# Patient Record
Sex: MALE | Race: WHITE | HISPANIC OR LATINO | Employment: OTHER | URBAN - METROPOLITAN AREA
[De-identification: names, ages, dates, MRNs, and addresses within clinical notes are randomized per-mention and may not be internally consistent; named-entity substitution may affect disease eponyms.]

---

## 2017-10-03 ENCOUNTER — TELEPHONE (OUTPATIENT)
Dept: SPINE | Facility: CLINIC | Age: 47
End: 2017-10-03

## 2017-12-04 ENCOUNTER — TELEPHONE (OUTPATIENT)
Dept: SPINE | Facility: CLINIC | Age: 47
End: 2017-12-04

## 2017-12-04 DIAGNOSIS — M54.2 CERVICALGIA: Primary | ICD-10-CM

## 2017-12-05 ENCOUNTER — HOSPITAL ENCOUNTER (OUTPATIENT)
Dept: RADIOLOGY | Facility: OTHER | Age: 47
Discharge: HOME | End: 2017-12-05
Attending: PHYSICIAN ASSISTANT
Payer: COMMERCIAL

## 2017-12-05 ENCOUNTER — OFFICE VISIT (OUTPATIENT)
Dept: SPINE | Facility: CLINIC | Age: 47
End: 2017-12-05
Payer: COMMERCIAL

## 2017-12-05 VITALS
WEIGHT: 205 LBS | DIASTOLIC BLOOD PRESSURE: 85 MMHG | HEART RATE: 63 BPM | BODY MASS INDEX: 30.36 KG/M2 | SYSTOLIC BLOOD PRESSURE: 136 MMHG | HEIGHT: 69 IN

## 2017-12-05 DIAGNOSIS — M54.2 NECK PAIN: ICD-10-CM

## 2017-12-05 DIAGNOSIS — M50.30 DEGENERATION OF CERVICAL INTERVERTEBRAL DISC: ICD-10-CM

## 2017-12-05 DIAGNOSIS — M54.2 CERVICALGIA: ICD-10-CM

## 2017-12-05 DIAGNOSIS — M47.812 CERVICAL SPONDYLOSIS WITHOUT MYELOPATHY: ICD-10-CM

## 2017-12-05 DIAGNOSIS — M54.12 CERVICAL RADICULITIS: ICD-10-CM

## 2017-12-05 PROCEDURE — 72050 X-RAY EXAM NECK SPINE 4/5VWS: CPT | Mod: 26,,, | Performed by: RADIOLOGY

## 2017-12-05 PROCEDURE — 99999 PR PBB SHADOW E&M-EST. PATIENT-LVL III: CPT | Mod: PBBFAC,,, | Performed by: PHYSICIAN ASSISTANT

## 2017-12-05 PROCEDURE — 99204 OFFICE O/P NEW MOD 45 MIN: CPT | Mod: S$GLB,,, | Performed by: PHYSICIAN ASSISTANT

## 2017-12-05 PROCEDURE — 72050 X-RAY EXAM NECK SPINE 4/5VWS: CPT | Mod: TC

## 2017-12-05 RX ORDER — GABAPENTIN 300 MG/1
CAPSULE ORAL
Qty: 90 CAPSULE | Refills: 2 | Status: SHIPPED | OUTPATIENT
Start: 2017-12-05 | End: 2018-04-26 | Stop reason: SDUPTHER

## 2017-12-05 RX ORDER — METHOCARBAMOL 750 MG/1
750 TABLET, FILM COATED ORAL 3 TIMES DAILY PRN
Qty: 90 TABLET | Refills: 1 | Status: SHIPPED | OUTPATIENT
Start: 2017-12-05 | End: 2018-01-04

## 2017-12-05 NOTE — PROGRESS NOTES
Subjective:     Patient ID:  Heraclio Lam is a 47 y.o. male.    Patient referred by a friend to Dr. Jaramillo    Chief Complaint: Neck and bilateral arm pain and paresthesias    HPI    Heraclio Lam is a 47 y.o. male who presents with the above CC.  Patient here for a second opinion.  Patient had a work injury in 1999 and states he had a shunt placed into his spinal cord from a posterior approach that initially helped with his burning neck and arm pain.  He then had a recurrence of symptoms and started to have pain again and had a C6-7 ACDF and got better.  Since then he has had a flare up of pain on and off over the years.  He has seen Dr. Carlson recently and in the past.  No further surgery recommended and PT ordered in the past.    Currently has had burning neck pain and bilateral arm pain in the back of the arms to all five fingers.  Pain in the neck and arms is constant rated 10/10.  Neck pain is worse with laying too long and with neck movement and better when he sleeps on his right.    Neck pain is worse than the arm pain.    Right and left arm pain is equal.    Some pain in the bilateral calves to the bottom of the feet.    Patient had PT which helped some.  No ESIs.  Two cervical spine surgeries.  Patient is currently not taking any medications for this problem.    Patient denies any recent accidents or trauma, no saddle anesthesias, and no bowel or bladder incontinence.    Patient has some difficulty with balance or gait, no difficulty tying shoes or buttoning clothes, is dropping things which is old, has difficulty opening containers which is new, and has had some change in handwriting.      Review of Systems:  Please refer to page three of the spine center intake form for a complete review of systems.    History reviewed. No pertinent past medical history.  History reviewed. No pertinent surgical history.  No current outpatient prescriptions on file prior to visit.     No current  "facility-administered medications on file prior to visit.      Review of patient's allergies indicates:  No Known Allergies  Social History     Social History    Marital status: Single     Spouse name: N/A    Number of children: N/A    Years of education: N/A     Occupational History    Not on file.     Social History Main Topics    Smoking status: Never Smoker    Smokeless tobacco: Not on file    Alcohol use Not on file    Drug use: Unknown    Sexual activity: Not on file     Other Topics Concern    Not on file     Social History Narrative    No narrative on file     History reviewed. No pertinent family history.    Objective:      Vitals:    12/05/17 1338   BP: 136/85   Pulse: 63   Weight: 93 kg (205 lb)   Height: 5' 9" (1.753 m)   PainSc:   7         Physical Exam:    General:  Heraclio Lam is well-developed, well-nourished, appears stated age, in no acute distress, alert and oriented to person, place, and time.    Pulmonary/Chest:  Respiratory effort normal  Abdominal: Exhibits no distension  Psychiatric:  Normal mood and affect.  Behavior is normal.  Judgement and thought content normal    Musculoskeletal:    Patient arises from a sitting to standing position without difficulty.  Patient walks to the door without evidence of limp, pain, or abnormality of gait. Patient is able to walk heel to toe at a slow pace.    Cervical ROM:   No pain in cervical spine flexion, extension, right lateral bending, left lateral bending, right rotation, and left rotation.    Cervical Spine Inspection:  Healed posterior and anterior surgical scars with no visible rashes.    Cervical Spine Palpation:  No tenderness to cervical spine palpation.    Neurological: Alert and oriented to person, place, and time    Muscle strength against resistance:     Right Left   Deltoid  5 / 5 5 / 5   Biceps  5 / 5 5 / 5   Triceps 5 / 5 5 / 5   Wrist flexion  5 / 5 5 / 5   Wrist extension 5 / 5 5 / 5   Finger abduction 5 / 5 5 / 5 "   Thumb opposition 5 / 5 5 / 5   Handgrip 5 / 5 5 / 5   Hip flexion  5 / 5 5 / 5   Hip extension 5 / 5 5 / 5   Hip abduction 5 / 5 5 / 5   Hip adduction 5/ 5 5 / 5   Knee extension  5 / 5 5 / 5   Knee flexion  5 / 5 5 / 5   Dorsiflexion  5 / 5 5 / 5   EHL  5 / 5 5 / 5   Plantar flexion  5 / 5 5 / 5   Inversion of the feet 5 / 5 5 / 5   Eversion of the feet 5 / 5 5 / 5     Reflexes:     Right Left   Triceps 2+ 2+   Biceps 2+ 2+   Brachioradialis 2+ 2+   Patellar 2+ 2+   Achilles 2+ 2+     Babinski: Negative bilaterally  Clonus:  Negative bilaterally  Lopez: Negative bilaterally  Negative lhermittes sign    On gross examination of the bilateral lower extremities, patient has full painfree ROM with no signs of clubbing, cyanosis, edema, and weakness.     XRAY/MRI Interpretation:     Cervical spine ap/lateral/flexion/extension xrays were personally reviewed today.  No fractures.  ACDF at C6-7.  Slight movement at C3-4 and C4-5 on flexion and extension.  DDD at C3-4.    Cervical spine MRI was personally reviewed today on a CD from 09/19/2017 which was then returned to the patient after reviewing it with Dr. Jaramillo.  C6-7 ACDF.  Previous posterior laminectomy defect.  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  Bilateral NFS at C3-4.  Left C4-5 NFS.  Disc bulge centrally at C5-6.    Assessment:          1. Cervical radiculitis    2. Cervical spondylosis without myelopathy    3. Degeneration of cervical intervertebral disc    4. Neck pain            Plan:          Orders Placed This Encounter    Ambulatory referral to Pain Clinic    gabapentin (NEURONTIN) 300 MG capsule    methocarbamol (ROBAXIN) 750 MG Tab     Patient with periodic flareups of neck and arm pain s/p C6-7 ACDF with Dr. Carlson in 2006 and ? spinal cord shunt in 1999.    -Recommend Neurontin  -Robaxin PRN  -Referral to pain clinic for assessment of injections  -No spine surgery needed at this time    All of the above discussed and reviewed with Dr. Jaramillo who  came in to see the patient today.    Follow-Up:  Return if symptoms worsen or fail to improve. If there are any questions prior to this, the patient was instructed to contact the office.       YAS Gonzalez, PA-C  Neurosurgery  [facility]  [facility]

## 2017-12-15 ENCOUNTER — OFFICE VISIT (OUTPATIENT)
Dept: PAIN MEDICINE | Facility: CLINIC | Age: 47
End: 2017-12-15
Attending: ANESTHESIOLOGY
Payer: COMMERCIAL

## 2017-12-15 VITALS
TEMPERATURE: 99 F | BODY MASS INDEX: 32.65 KG/M2 | DIASTOLIC BLOOD PRESSURE: 86 MMHG | WEIGHT: 220.44 LBS | RESPIRATION RATE: 18 BRPM | SYSTOLIC BLOOD PRESSURE: 141 MMHG | HEIGHT: 69 IN | HEART RATE: 78 BPM

## 2017-12-15 DIAGNOSIS — G89.4 CHRONIC PAIN SYNDROME: ICD-10-CM

## 2017-12-15 DIAGNOSIS — M96.1 CERVICAL POST-LAMINECTOMY SYNDROME: ICD-10-CM

## 2017-12-15 DIAGNOSIS — M54.12 CERVICAL RADICULOPATHY: ICD-10-CM

## 2017-12-15 PROCEDURE — 99999 PR PBB SHADOW E&M-EST. PATIENT-LVL III: CPT | Mod: PBBFAC,,, | Performed by: ANESTHESIOLOGY

## 2017-12-15 PROCEDURE — 99244 OFF/OP CNSLTJ NEW/EST MOD 40: CPT | Mod: S$GLB,,, | Performed by: ANESTHESIOLOGY

## 2017-12-15 NOTE — LETTER
December 15, 2017      Herlinda Baird PA-C  [address]  [facility] [address]           [facility]  [address] [facility] [address]  Phone: [phone]  Fax: [phone]          Patient: Heraclio Lam   MR Number: [id number]   YOB: 1970   Date of Visit: 12/15/2017       Dear Herlinda Baird:    Thank you for referring Heraclio Lam to me for evaluation. Attached you will find relevant portions of my assessment and plan of care.    If you have questions, please do not hesitate to call me. I look forward to following Heraclio Lam along with you.    Sincerely,    Elba Juarez MD    Enclosure  CC:  No Recipients    If you would like to receive this communication electronically, please contact [email or web address] or [phone] to request more information on [employer] access.    For providers and/or their staff who would like to refer a patient to [facility], please contact us through our one-stop-shop provider referral line, [facility], at [phone].    If you feel you have received this communication in error or would no longer like to receive these types of communications, please e-mail [email or web address]

## 2017-12-15 NOTE — PROGRESS NOTES
Chronic Pain - New Consult    Referring Physician: Herlinda Baird, *    Chief Complaint:   Chief Complaint   Patient presents with    Neck Pain    Back Pain    Shoulder Pain        SUBJECTIVE: Disclaimer: This note has been generated using voice-recognition software. There may be typographical errors that have been missed during proof-reading    Initial encounter:    Heraclio Lam presents to the clinic for the evaluation of neck pain. The pain started years ago following picking up a heavy object and symptoms have been worsening.    Brief history:  Patient has a history of ACDF at C6-7 and a history of posterior laminectomy of the cervical spine at the same levels.  In addition to his bilateral upper extremity radicular symptoms he does have bilateral lower extremity radicular symptoms.  Patient has not been having any signs of myelopathy    Pain Description:    The pain is located in the neck area and radiates to bilateral upper extremities    At BEST  0/10     At WORST  10/10 on the WORST day.      On average pain is rated as 8/10.     Today the pain is rated as 8/10    The pain is described as aching, burning, numbing, shooting, throbbing, tight band and tingling      Symptoms interfere with daily activity and sleeping.     Exacerbating factors: Sitting, Laying, Bending, Lifting and Getting out of bed/chair.      Mitigating factors medications.     Patient denies .  Patient denies any suicidal or homicidal ideations    Pain Medications:  Current:  Gabapentin currently 300mg qHS  Robaxin    Tried in Past:  NSAIDs -Never  TCA -Never  SNRI -Never  Opioids-Never    Physical Therapy/Home Exercise: yes       report:  Reviewed and consistent with medication use as prescribed.    Pain Procedures: none    Chiropractor -never  Acupuncture - never  TENS unit -never  Spinal decompression - previous ACDF C6-7 previous posterior laminectomy  Joint replacement -never    Imaging:   XRAY C SPINE  12/04/2017    Narrative     X-ray cervical spine AP lateral with flexion and extension demonstrates that the patient has undergone a prior C6-C7 anterior fusion with plate and screws and posterior decompression, and the fusion appears solid.  There is normal alignment, and flexion and extension views show no instability.  T1 is not optimally viewed on the lateral views.   Impression      Postoperative changes at C6-C7, no instability     Cervical MRI 9/2017:   C6-7 ACDF.  Previous posterior laminectomy defect.  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  Bilateral NFS at C3-4.  Left C4-5 NFS.  Disc bulge centrally at C5-6.    History reviewed. No pertinent past medical history.  History reviewed. No pertinent surgical history.  Social History     Social History    Marital status: Single     Spouse name: N/A    Number of children: N/A    Years of education: N/A     Occupational History    Not on file.     Social History Main Topics    Smoking status: Never Smoker    Smokeless tobacco: Not on file    Alcohol use Not on file    Drug use: Unknown    Sexual activity: Not on file     Other Topics Concern    Not on file     Social History Narrative    No narrative on file     History reviewed. No pertinent family history.    Review of patient's allergies indicates:  No Known Allergies    Current Outpatient Prescriptions   Medication Sig    gabapentin (NEURONTIN) 300 MG capsule Take one cap PO QHS for 7 days, then one cap PO BID for the next 7 days, and then take one cap PO TID and continue    methocarbamol (ROBAXIN) 750 MG Tab Take 1 tablet (750 mg total) by mouth 3 (three) times daily as needed.     No current facility-administered medications for this visit.        REVIEW OF SYSTEMS:    GENERAL:  No weight loss, malaise or fevers.  HEENT:   No recent changes in vision or hearing  NECK:  Negative for lumps, no difficulty with swallowing.  RESPIRATORY:  Negative for cough, wheezing or shortness of breath, patient  "denies any recent URI.  CARDIOVASCULAR:  Negative for chest pain, leg swelling or palpitations.  GI:  Negative for abdominal discomfort, blood in stools or black stools or change in bowel habits.  MUSCULOSKELETAL:  See HPI.  SKIN:  Negative for lesions, rash, and itching.  PSYCH:  No mood disorder or recent psychosocial stressors.  Patients sleep is disturbed secondary to pain.  HEMATOLOGY/LYMPHOLOGY:  Negative for prolonged bleeding, bruising easily or swollen nodes.  Patient is not currently taking any anti-coagulants  ENDO: No history of diabetes or thyroid dysfunction  NEURO:   No history of headaches, syncope, paralysis, seizures or tremors.  All other reviewed and negative other than HPI.    OBJECTIVE:    BP (!) 141/86   Pulse 78   Temp 98.5 °F (36.9 °C) (Oral)   Resp 18   Ht 5' 9" (1.753 m)   Wt 100 kg (220 lb 7.4 oz)   BMI 32.56 kg/m²     PHYSICAL EXAMINATION:    GENERAL: Well appearing, in no acute distress, alert and oriented x3.  PSYCH:  Mood and affect appropriate.  SKIN: Skin color, texture, turgor normal, no rashes or lesions.  HEAD/FACE:  Normocephalic, atraumatic. Cranial nerves grossly intact.  NECK: Pain to palpation over the cervical paraspinous muscles. Spurling Negative. Pain with neck flexion, extension, and lateral flexion.   CV: RRR with palpation of the radial artery.  PULM: No evidence of respiratory difficulty, symmetric chest rise.  EXTREMITIES: Peripheral joint ROM is full and pain free without obvious instability or laxity in all four extremities. No deformities, edema, or skin discoloration. Good capillary refill.  MUSCULOSKELETAL: Shoulder provocative maneuvers are negative.   Bilateral upper  extremity strength is normal and symmetric.  No atrophy or tone abnormalities are noted.  NEURO: Bilateral upper extremity coordination and muscle stretch reflexes are physiologic and symmetric.  Armaan's negative. No clonus.  No loss of sensation is noted.  GAIT: Antalgic, ambulates " without assistance         ASSESSMENT: 47 y.o. year old male with pain, consistent with     Encounter Diagnoses   Name Primary?    Cervical radiculopathy     Chronic pain syndrome     Cervical post-laminectomy syndrome        PLAN:   Because of a history of his previous cervical fusion and laminectomy at C6-7 we will perform intralaminar epidural steroid injection at T1/2 for radicular symptoms by 2, 2 weeks apart with a two-week follow-up    Continue escalation of gabapentin to goal dose of 900 mg per day in the future this can be further escalated to 1800 mg per day    I provided the patient with a [facility] informational packet to learn about stimulation as a possible treatment option in the future    Follow-up after injection to determine plan moving forward    The above plan and management options were discussed at length with patient. Patient is in agreement with the above and verbalized understanding. It will be communicated with the referring physician via electronic record, fax, or [employer].    Elba Juarez  12/15/2017

## 2017-12-22 ENCOUNTER — SURGERY (OUTPATIENT)
Age: 47
End: 2017-12-22

## 2017-12-22 ENCOUNTER — HOSPITAL ENCOUNTER (OUTPATIENT)
Facility: OTHER | Age: 47
Discharge: HOME | End: 2017-12-22
Attending: ANESTHESIOLOGY | Admitting: ANESTHESIOLOGY
Payer: COMMERCIAL

## 2017-12-22 VITALS
HEIGHT: 69 IN | SYSTOLIC BLOOD PRESSURE: 127 MMHG | WEIGHT: 220 LBS | BODY MASS INDEX: 32.58 KG/M2 | RESPIRATION RATE: 18 BRPM | OXYGEN SATURATION: 97 % | TEMPERATURE: 98 F | DIASTOLIC BLOOD PRESSURE: 82 MMHG | HEART RATE: 69 BPM

## 2017-12-22 DIAGNOSIS — M54.12 CERVICAL RADICULOPATHY: Primary | ICD-10-CM

## 2017-12-22 DIAGNOSIS — G89.29 CHRONIC PAIN: ICD-10-CM

## 2017-12-22 PROCEDURE — 62321 NJX INTERLAMINAR CRV/THRC: CPT | Mod: ,,, | Performed by: ANESTHESIOLOGY

## 2017-12-22 PROCEDURE — 62321 NJX INTERLAMINAR CRV/THRC: CPT | Performed by: ANESTHESIOLOGY

## 2017-12-22 PROCEDURE — 99152 MOD SED SAME PHYS/QHP 5/>YRS: CPT | Mod: ,,, | Performed by: ANESTHESIOLOGY

## 2017-12-22 PROCEDURE — 62320 NJX INTERLAMINAR CRV/THRC: CPT | Performed by: ANESTHESIOLOGY

## 2017-12-22 PROCEDURE — 63600175 PHARM REV CODE 636 W HCPCS: Performed by: ANESTHESIOLOGY

## 2017-12-22 PROCEDURE — 25500020 PHARM REV CODE 255: Performed by: ANESTHESIOLOGY

## 2017-12-22 PROCEDURE — 25000003 PHARM REV CODE 250: Performed by: ANESTHESIOLOGY

## 2017-12-22 RX ORDER — LIDOCAINE HYDROCHLORIDE 10 MG/ML
INJECTION INFILTRATION; PERINEURAL
Status: DISCONTINUED | OUTPATIENT
Start: 2017-12-22 | End: 2017-12-22 | Stop reason: HOSPADM

## 2017-12-22 RX ORDER — MIDAZOLAM HYDROCHLORIDE 1 MG/ML
INJECTION INTRAMUSCULAR; INTRAVENOUS
Status: DISCONTINUED | OUTPATIENT
Start: 2017-12-22 | End: 2017-12-22 | Stop reason: HOSPADM

## 2017-12-22 RX ORDER — DEXAMETHASONE SODIUM PHOSPHATE 10 MG/ML
INJECTION INTRAMUSCULAR; INTRAVENOUS
Status: DISCONTINUED | OUTPATIENT
Start: 2017-12-22 | End: 2017-12-22 | Stop reason: HOSPADM

## 2017-12-22 RX ORDER — BUPIVACAINE HYDROCHLORIDE 2.5 MG/ML
INJECTION, SOLUTION EPIDURAL; INFILTRATION; INTRACAUDAL
Status: DISCONTINUED | OUTPATIENT
Start: 2017-12-22 | End: 2017-12-22 | Stop reason: HOSPADM

## 2017-12-22 RX ORDER — SODIUM CHLORIDE 9 MG/ML
500 INJECTION, SOLUTION INTRAVENOUS CONTINUOUS
Status: DISCONTINUED | OUTPATIENT
Start: 2017-12-22 | End: 2017-12-22 | Stop reason: HOSPADM

## 2017-12-22 RX ADMIN — LIDOCAINE HYDROCHLORIDE 10 ML: 10 INJECTION, SOLUTION INFILTRATION; PERINEURAL at 08:12

## 2017-12-22 RX ADMIN — DEXAMETHASONE SODIUM PHOSPHATE 10 MG: 10 INJECTION, SOLUTION INTRAMUSCULAR; INTRAVENOUS at 08:12

## 2017-12-22 RX ADMIN — IOHEXOL 3 ML: 300 INJECTION, SOLUTION INTRAVENOUS at 08:12

## 2017-12-22 RX ADMIN — MIDAZOLAM HYDROCHLORIDE 1 MG: 1 INJECTION, SOLUTION INTRAMUSCULAR; INTRAVENOUS at 08:12

## 2017-12-22 RX ADMIN — MIDAZOLAM HYDROCHLORIDE 2 MG: 1 INJECTION, SOLUTION INTRAMUSCULAR; INTRAVENOUS at 08:12

## 2017-12-22 RX ADMIN — BUPIVACAINE HYDROCHLORIDE 10 ML: 2.5 INJECTION, SOLUTION EPIDURAL; INFILTRATION; INTRACAUDAL; PERINEURAL at 08:12

## 2017-12-22 NOTE — PLAN OF CARE
PATIENT TOLERATED PROCEDURE WELL. PT COMPLAINS OF 0PATIENT TOLERATED PROCEDURE WELL. PT COMPLAINS OF  /10 PAIN. ASSISTED PATIENT UP FOR FIRST TIME. STEADY ON FEET AND DISCHARGE INSTRUCTIONS GIVEN. /10 PAIN. ASSISTED PATIENT UP FOR FIRST TIME. STEADY ON FEET AND DISCHARGE INSTRUCTIONS GIVEN.

## 2017-12-22 NOTE — OP NOTE
Cervical Interlaminar Epidural Steroid Injection under Fluoroscopic Guidance.   Time-out taken to identify patient and procedure prior to starting the procedure.     Date of Procedure: 12/22/2017    PROCEDURE: Cervical Interlaminar epidural steroid injection T1/2 under fluoroscopic guidance.     Pre-Op diagnosis: Cervical radiculopathy [M54.12]    Post-Op diagnosis: Cervical radiculopathy [M54.12]    PHYSICIAN: CHRIS MATA     Assistants:   Killian Roman DO, PGY-5, Pain Fellow  I was present and supervising all critical portions of the procedure      MEDICATIONS INJECTED: Preservative-free Decadron 10 mg with 1mL of sterile 0.25%Bupivicaine and 3ml of preservative free normal saline.     LOCAL ANESTHETIC INJECTED: Xylocaine 1% 3mL    ESTIMATED BLOOD LOSS: none.     COMPLICATIONS: none.     TECHNIQUE: With the patient laying in a prone position, the area was prepped and draped in the usual sterile fashion using ChloraPrep and a fenestrated drape. Local anesthetic was given using a 27-gauge needle by raising a wheal and going down to the hub of the needle over the C7/T1 interlaminar space.  The interlaminar space was then approached with a 3.5 inch 18-gauge Touhy needle was introduced under fluoroscopic guidance in the AP and Lateral view. Once the Ligamentum flavum was encountered loss of resistance to saline was used to enter the epidural space. With positive loss of resistance and negative CSF or Blood, 2mL contrast dye Omnipaque (300mg/ml) was injected to confirm placement and there was no vascular runoff. The medication was then injected slowly. The patient tolerated the procedure well.     Conscious sedation provided by M.D    The patient was monitored with continuous pulse oximetry, EKG, and intermittent blood pressure monitors.  The patient was hemodynamically stable throughout the entire process was responsive to voice, and breathing spontaneously.  Supplemental O2 was provided at 2L/min via nasal  cannula.  Patient was comfortable for the duration of the procedure. (See nurse documentation and case log for sedation time)    There was a total of 3 mg IV Midazolam was titrated for the procedure        The patient was monitored after the procedure.   They were given post-procedure and discharge instructions to follow at home.  The patient was discharged in a stable condition.

## 2017-12-22 NOTE — DISCHARGE SUMMARY
Discharge Note  Short Stay      SUMMARY     Admit Date: 12/22/2017    Attending Physician: Elba Juarez    Discharge Diagnosis: Cervical radiculopathy [M54.12]    Discharge Physician: Elba Juarez      Discharge Date: 12/22/2017 8:47 AM       PROCEDURE: Cervical Interlaminar epidural steroid injection T1/2 under fluoroscopic guidance.     Pre-Op diagnosis: Cervical radiculopathy [M54.12]    Post-Op diagnosis: Cervical radiculopathy [M54.12]    Disposition: Home or self care    Patient Instructions:   Current Discharge Medication List      CONTINUE these medications which have NOT CHANGED    Details   gabapentin (NEURONTIN) 300 MG capsule Take one cap PO QHS for 7 days, then one cap PO BID for the next 7 days, and then take one cap PO TID and continue  Qty: 90 capsule, Refills: 2    Associated Diagnoses: Cervical radiculitis; Cervical spondylosis without myelopathy; Degeneration of cervical intervertebral disc; Neck pain      methocarbamol (ROBAXIN) 750 MG Tab Take 1 tablet (750 mg total) by mouth 3 (three) times daily as needed.  Qty: 90 tablet, Refills: 1    Associated Diagnoses: Cervical radiculitis; Cervical spondylosis without myelopathy; Degeneration of cervical intervertebral disc; Neck pain             Resume home diet and activity

## 2017-12-22 NOTE — H&P (VIEW-ONLY)
Chronic Pain - New Consult    Referring Physician: Herlinda Baird, *    Chief Complaint:   Chief Complaint   Patient presents with    Neck Pain    Back Pain    Shoulder Pain        SUBJECTIVE: Disclaimer: This note has been generated using voice-recognition software. There may be typographical errors that have been missed during proof-reading    Initial encounter:    Heraclio Lam presents to the clinic for the evaluation of neck pain. The pain started years ago following picking up a heavy object and symptoms have been worsening.    Brief history:  Patient has a history of ACDF at C6-7 and a history of posterior laminectomy of the cervical spine at the same levels.  In addition to his bilateral upper extremity radicular symptoms he does have bilateral lower extremity radicular symptoms.  Patient has not been having any signs of myelopathy    Pain Description:    The pain is located in the neck area and radiates to bilateral upper extremities    At BEST  0/10     At WORST  10/10 on the WORST day.      On average pain is rated as 8/10.     Today the pain is rated as 8/10    The pain is described as aching, burning, numbing, shooting, throbbing, tight band and tingling      Symptoms interfere with daily activity and sleeping.     Exacerbating factors: Sitting, Laying, Bending, Lifting and Getting out of bed/chair.      Mitigating factors medications.     Patient denies .  Patient denies any suicidal or homicidal ideations    Pain Medications:  Current:  Gabapentin currently 300mg qHS  Robaxin    Tried in Past:  NSAIDs -Never  TCA -Never  SNRI -Never  Opioids-Never    Physical Therapy/Home Exercise: yes       report:  Reviewed and consistent with medication use as prescribed.    Pain Procedures: none    Chiropractor -never  Acupuncture - never  TENS unit -never  Spinal decompression - previous ACDF C6-7 previous posterior laminectomy  Joint replacement -never    Imaging:   XRAY C SPINE  12/04/2017    Narrative     X-ray cervical spine AP lateral with flexion and extension demonstrates that the patient has undergone a prior C6-C7 anterior fusion with plate and screws and posterior decompression, and the fusion appears solid.  There is normal alignment, and flexion and extension views show no instability.  T1 is not optimally viewed on the lateral views.   Impression      Postoperative changes at C6-C7, no instability     Cervical MRI 9/2017:   C6-7 ACDF.  Previous posterior laminectomy defect.  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  Bilateral NFS at C3-4.  Left C4-5 NFS.  Disc bulge centrally at C5-6.    History reviewed. No pertinent past medical history.  History reviewed. No pertinent surgical history.  Social History     Social History    Marital status: Single     Spouse name: N/A    Number of children: N/A    Years of education: N/A     Occupational History    Not on file.     Social History Main Topics    Smoking status: Never Smoker    Smokeless tobacco: Not on file    Alcohol use Not on file    Drug use: Unknown    Sexual activity: Not on file     Other Topics Concern    Not on file     Social History Narrative    No narrative on file     History reviewed. No pertinent family history.    Review of patient's allergies indicates:  No Known Allergies    Current Outpatient Prescriptions   Medication Sig    gabapentin (NEURONTIN) 300 MG capsule Take one cap PO QHS for 7 days, then one cap PO BID for the next 7 days, and then take one cap PO TID and continue    methocarbamol (ROBAXIN) 750 MG Tab Take 1 tablet (750 mg total) by mouth 3 (three) times daily as needed.     No current facility-administered medications for this visit.        REVIEW OF SYSTEMS:    GENERAL:  No weight loss, malaise or fevers.  HEENT:   No recent changes in vision or hearing  NECK:  Negative for lumps, no difficulty with swallowing.  RESPIRATORY:  Negative for cough, wheezing or shortness of breath, patient  "denies any recent URI.  CARDIOVASCULAR:  Negative for chest pain, leg swelling or palpitations.  GI:  Negative for abdominal discomfort, blood in stools or black stools or change in bowel habits.  MUSCULOSKELETAL:  See HPI.  SKIN:  Negative for lesions, rash, and itching.  PSYCH:  No mood disorder or recent psychosocial stressors.  Patients sleep is disturbed secondary to pain.  HEMATOLOGY/LYMPHOLOGY:  Negative for prolonged bleeding, bruising easily or swollen nodes.  Patient is not currently taking any anti-coagulants  ENDO: No history of diabetes or thyroid dysfunction  NEURO:   No history of headaches, syncope, paralysis, seizures or tremors.  All other reviewed and negative other than HPI.    OBJECTIVE:    BP (!) 141/86   Pulse 78   Temp 98.5 °F (36.9 °C) (Oral)   Resp 18   Ht 5' 9" (1.753 m)   Wt 100 kg (220 lb 7.4 oz)   BMI 32.56 kg/m²     PHYSICAL EXAMINATION:    GENERAL: Well appearing, in no acute distress, alert and oriented x3.  PSYCH:  Mood and affect appropriate.  SKIN: Skin color, texture, turgor normal, no rashes or lesions.  HEAD/FACE:  Normocephalic, atraumatic. Cranial nerves grossly intact.  NECK: Pain to palpation over the cervical paraspinous muscles. Spurling Negative. Pain with neck flexion, extension, and lateral flexion.   CV: RRR with palpation of the radial artery.  PULM: No evidence of respiratory difficulty, symmetric chest rise.  EXTREMITIES: Peripheral joint ROM is full and pain free without obvious instability or laxity in all four extremities. No deformities, edema, or skin discoloration. Good capillary refill.  MUSCULOSKELETAL: Shoulder provocative maneuvers are negative.   Bilateral upper  extremity strength is normal and symmetric.  No atrophy or tone abnormalities are noted.  NEURO: Bilateral upper extremity coordination and muscle stretch reflexes are physiologic and symmetric.  Armaan's negative. No clonus.  No loss of sensation is noted.  GAIT: Antalgic, ambulates " without assistance         ASSESSMENT: 47 y.o. year old male with pain, consistent with     Encounter Diagnoses   Name Primary?    Cervical radiculopathy     Chronic pain syndrome     Cervical post-laminectomy syndrome        PLAN:   Because of a history of his previous cervical fusion and laminectomy at C6-7 we will perform intralaminar epidural steroid injection at T1/2 for radicular symptoms by 2, 2 weeks apart with a two-week follow-up    Continue escalation of gabapentin to goal dose of 900 mg per day in the future this can be further escalated to 1800 mg per day    I provided the patient with a [employer] informational packet to learn about stimulation as a possible treatment option in the future    Follow-up after injection to determine plan moving forward    The above plan and management options were discussed at length with patient. Patient is in agreement with the above and verbalized understanding. It will be communicated with the referring physician via electronic record, fax, or [employer].    Elba Juarez  12/15/2017

## 2017-12-22 NOTE — INTERVAL H&P NOTE
The patient has been examined and the H&P has been reviewed:    I concur with the findings and no changes have occurred since H&P was written.     Cc: neck pain with radiation to the arms    HPI: 46 yo male here today for T1-T2 ILESI with sedation. He denies any recent infections or Abx use. He denies any use of blood thinners.         ROS  Denies fevers, chills, unexplained weight changes  HEENT  Denies  acute vision changes,  seizures, sore throat  CV Denies chest pain/palpitations  Pulm  Denies sob  GI  denies abd pain/acute changes   denies burning/acute changes  Psych Denies homicidal/suicidal ideation      PE:  Vitals Reviewed    AAOx3    CTA Bilat  RRR  S/NABS  Palpable pulses  Moving all limbs    A/P:   Will proceed with T1-2 ILESI with sedation.         Anesthesia/Surgery risks, benefits and alternative options discussed and understood by patient/family.          Active Hospital Problems    Diagnosis  POA    Chronic pain [G89.29]  Yes      Resolved Hospital Problems    Diagnosis Date Resolved POA   No resolved problems to display.

## 2017-12-22 NOTE — OR NURSING
Pt in Trandelenberg position for 10 minutes. 8:35 to 8:45. Tolerated well. Remains w/ pain score of 0. Mother at bedside.

## 2017-12-22 NOTE — DISCHARGE INSTRUCTIONS
Home Care Instructions Pain Management:    1. DIET:   You may resume your normal diet today.   2. BATHING:   You may shower with luke warm water.  3. DRESSING:   You may remove your bandage today.   4. ACTIVITY LEVEL:   You may resume your normal activities 24 hrs after your procedure.  5. MEDICATIONS:   You may resume your normal medications today.   6. SPECIAL INSTRUCTIONS:   No heat to the injection site for 24 hrs including, bath or shower, heating pad, moist heat, or hot tubs.    Use ice pack to injection site for any pain or discomfort.  Apply ice packs for 20 minute intervals as needed.   If you have received any sedatives by mouth today you may not drive for 12 hours.    If you have received any sedation through your IV, you may not drive for 24 hrs.     PLEASE CALL YOUR DOCTOR IF:  1. Redness or swelling around the injection site.  2. Fever of 101 degrees  3. Drainage (pus) from the injection site.  4. For any continuous bleeding (some dried blood over the incision is normal.)    FOR EMERGENCIES:   If any unusual problems or difficulties occur during clinic hours, call [phone] or [phone].     Thank you for allowing us to care for you today. You may receive a survey about the care we provided. Your feedback is valuable and helps us provide excellent care throughout the community.

## 2018-01-02 ENCOUNTER — TELEPHONE (OUTPATIENT)
Dept: PAIN MEDICINE | Facility: CLINIC | Age: 48
End: 2018-01-02

## 2018-01-02 NOTE — TELEPHONE ENCOUNTER
----- Message from Sherron Moya sent at 1/2/2018 12:38 PM CST -----  Contact: Pt's mom Jessy  Pt has an upcoming procedure and mom would like to know the arrival time/prep instruction information.        Please call Ms. Hines [phone].  Thanks!

## 2018-01-03 ENCOUNTER — HOSPITAL ENCOUNTER (OUTPATIENT)
Facility: OTHER | Age: 48
Discharge: HOME | End: 2018-01-03
Attending: ANESTHESIOLOGY | Admitting: ANESTHESIOLOGY
Payer: COMMERCIAL

## 2018-01-03 ENCOUNTER — SURGERY (OUTPATIENT)
Age: 48
End: 2018-01-03

## 2018-01-03 VITALS
RESPIRATION RATE: 18 BRPM | HEIGHT: 69 IN | SYSTOLIC BLOOD PRESSURE: 130 MMHG | BODY MASS INDEX: 32.58 KG/M2 | DIASTOLIC BLOOD PRESSURE: 83 MMHG | HEART RATE: 65 BPM | OXYGEN SATURATION: 100 % | WEIGHT: 220 LBS | TEMPERATURE: 99 F

## 2018-01-03 DIAGNOSIS — M54.12 CERVICAL RADICULOPATHY: Primary | ICD-10-CM

## 2018-01-03 PROCEDURE — 25000003 PHARM REV CODE 250: Performed by: ANESTHESIOLOGY

## 2018-01-03 PROCEDURE — 62320 NJX INTERLAMINAR CRV/THRC: CPT | Performed by: ANESTHESIOLOGY

## 2018-01-03 PROCEDURE — 62321 NJX INTERLAMINAR CRV/THRC: CPT | Mod: ,,, | Performed by: ANESTHESIOLOGY

## 2018-01-03 PROCEDURE — 62321 NJX INTERLAMINAR CRV/THRC: CPT | Performed by: ANESTHESIOLOGY

## 2018-01-03 PROCEDURE — 25500020 PHARM REV CODE 255: Performed by: ANESTHESIOLOGY

## 2018-01-03 PROCEDURE — 63600175 PHARM REV CODE 636 W HCPCS: Performed by: ANESTHESIOLOGY

## 2018-01-03 RX ORDER — BUPIVACAINE HYDROCHLORIDE 2.5 MG/ML
INJECTION, SOLUTION EPIDURAL; INFILTRATION; INTRACAUDAL
Status: DISCONTINUED | OUTPATIENT
Start: 2018-01-03 | End: 2018-01-03 | Stop reason: HOSPADM

## 2018-01-03 RX ORDER — DEXAMETHASONE SODIUM PHOSPHATE 10 MG/ML
INJECTION INTRAMUSCULAR; INTRAVENOUS
Status: DISCONTINUED | OUTPATIENT
Start: 2018-01-03 | End: 2018-01-03 | Stop reason: HOSPADM

## 2018-01-03 RX ORDER — SODIUM CHLORIDE 9 MG/ML
INJECTION, SOLUTION INTRAVENOUS CONTINUOUS
Status: DISCONTINUED | OUTPATIENT
Start: 2018-01-03 | End: 2018-01-03 | Stop reason: HOSPADM

## 2018-01-03 RX ORDER — LIDOCAINE HYDROCHLORIDE 10 MG/ML
INJECTION INFILTRATION; PERINEURAL
Status: DISCONTINUED | OUTPATIENT
Start: 2018-01-03 | End: 2018-01-03 | Stop reason: HOSPADM

## 2018-01-03 RX ADMIN — DEXAMETHASONE SODIUM PHOSPHATE 10 MG: 10 INJECTION, SOLUTION INTRAMUSCULAR; INTRAVENOUS at 08:01

## 2018-01-03 RX ADMIN — IOHEXOL 10 ML: 300 INJECTION, SOLUTION INTRAVENOUS at 08:01

## 2018-01-03 RX ADMIN — BUPIVACAINE HYDROCHLORIDE 10 ML: 2.5 INJECTION, SOLUTION EPIDURAL; INFILTRATION; INTRACAUDAL; PERINEURAL at 08:01

## 2018-01-03 RX ADMIN — LIDOCAINE HYDROCHLORIDE 10 ML: 10 INJECTION, SOLUTION INFILTRATION; PERINEURAL at 08:01

## 2018-01-03 NOTE — INTERVAL H&P NOTE
"The patient has been examined and the H&P has been reviewed:    I concur with the findings and no changes have occurred since H&P was written.    Anesthesia/Surgery risks, benefits and alternative options discussed and understood by patient/family.    HPI  Patient had improvement from previous epidural injection at T1-2.  Continues to report radicular symptoms bilaterally, but there is a decrease in the intensity.  Here today for 2/2 injection for a potential cumulative effect of repeat steroid dosing.  No other health changes since previous encounter.    PMHx, PSHx, Allergies, Medications reviewed in epic    ROS negative except pain complaints in HPI    OBJECTIVE:    /83 (BP Location: Right arm, Patient Position: Lying)   Pulse 65   Temp 98.5 °F (36.9 °C) (Oral)   Resp 18   Ht 5' 9" (1.753 m)   Wt 99.8 kg (220 lb)   SpO2 100%   BMI 32.49 kg/m²     PHYSICAL EXAMINATION:    GENERAL: Well appearing, in no acute distress, alert and oriented x3.  PSYCH:  Mood and affect appropriate.  SKIN: Skin color, texture, turgor normal, no rashes or lesions.  CV: RRR with palpation of the radial artery.  PULM: No evidence of respiratory difficulty, symmetric chest rise. Clear to auscultation.  NEURO: Cranial nerves grossly intact.    Plan:    Proceed with procedure as planned    Elba Juarez  01/03/2018          There are no hospital problems to display for this patient.    "

## 2018-01-03 NOTE — H&P (VIEW-ONLY)
Chronic Pain - New Consult    Referring Physician: Herlinda Baird, *    Chief Complaint:   Chief Complaint   Patient presents with    Neck Pain    Back Pain    Shoulder Pain        SUBJECTIVE: Disclaimer: This note has been generated using voice-recognition software. There may be typographical errors that have been missed during proof-reading    Initial encounter:    Heraclio Lam presents to the clinic for the evaluation of neck pain. The pain started years ago following picking up a heavy object and symptoms have been worsening.    Brief history:  Patient has a history of ACDF at C6-7 and a history of posterior laminectomy of the cervical spine at the same levels.  In addition to his bilateral upper extremity radicular symptoms he does have bilateral lower extremity radicular symptoms.  Patient has not been having any signs of myelopathy    Pain Description:    The pain is located in the neck area and radiates to bilateral upper extremities    At BEST  0/10     At WORST  10/10 on the WORST day.      On average pain is rated as 8/10.     Today the pain is rated as 8/10    The pain is described as aching, burning, numbing, shooting, throbbing, tight band and tingling      Symptoms interfere with daily activity and sleeping.     Exacerbating factors: Sitting, Laying, Bending, Lifting and Getting out of bed/chair.      Mitigating factors medications.     Patient denies .  Patient denies any suicidal or homicidal ideations    Pain Medications:  Current:  Gabapentin currently 300mg qHS  Robaxin    Tried in Past:  NSAIDs -Never  TCA -Never  SNRI -Never  Opioids-Never    Physical Therapy/Home Exercise: yes       report:  Reviewed and consistent with medication use as prescribed.    Pain Procedures: none    Chiropractor -never  Acupuncture - never  TENS unit -never  Spinal decompression - previous ACDF C6-7 previous posterior laminectomy  Joint replacement -never    Imaging:   XRAY C SPINE  12/04/2017    Narrative     X-ray cervical spine AP lateral with flexion and extension demonstrates that the patient has undergone a prior C6-C7 anterior fusion with plate and screws and posterior decompression, and the fusion appears solid.  There is normal alignment, and flexion and extension views show no instability.  T1 is not optimally viewed on the lateral views.   Impression      Postoperative changes at C6-C7, no instability     Cervical MRI 9/2017:   C6-7 ACDF.  Previous posterior laminectomy defect.  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  Bilateral NFS at C3-4.  Left C4-5 NFS.  Disc bulge centrally at C5-6.    History reviewed. No pertinent past medical history.  History reviewed. No pertinent surgical history.  Social History     Social History    Marital status: Single     Spouse name: N/A    Number of children: N/A    Years of education: N/A     Occupational History    Not on file.     Social History Main Topics    Smoking status: Never Smoker    Smokeless tobacco: Not on file    Alcohol use Not on file    Drug use: Unknown    Sexual activity: Not on file     Other Topics Concern    Not on file     Social History Narrative    No narrative on file     History reviewed. No pertinent family history.    Review of patient's allergies indicates:  No Known Allergies    Current Outpatient Prescriptions   Medication Sig    gabapentin (NEURONTIN) 300 MG capsule Take one cap PO QHS for 7 days, then one cap PO BID for the next 7 days, and then take one cap PO TID and continue    methocarbamol (ROBAXIN) 750 MG Tab Take 1 tablet (750 mg total) by mouth 3 (three) times daily as needed.     No current facility-administered medications for this visit.        REVIEW OF SYSTEMS:    GENERAL:  No weight loss, malaise or fevers.  HEENT:   No recent changes in vision or hearing  NECK:  Negative for lumps, no difficulty with swallowing.  RESPIRATORY:  Negative for cough, wheezing or shortness of breath, patient  "denies any recent URI.  CARDIOVASCULAR:  Negative for chest pain, leg swelling or palpitations.  GI:  Negative for abdominal discomfort, blood in stools or black stools or change in bowel habits.  MUSCULOSKELETAL:  See HPI.  SKIN:  Negative for lesions, rash, and itching.  PSYCH:  No mood disorder or recent psychosocial stressors.  Patients sleep is disturbed secondary to pain.  HEMATOLOGY/LYMPHOLOGY:  Negative for prolonged bleeding, bruising easily or swollen nodes.  Patient is not currently taking any anti-coagulants  ENDO: No history of diabetes or thyroid dysfunction  NEURO:   No history of headaches, syncope, paralysis, seizures or tremors.  All other reviewed and negative other than HPI.    OBJECTIVE:    BP (!) 141/86   Pulse 78   Temp 98.5 °F (36.9 °C) (Oral)   Resp 18   Ht 5' 9" (1.753 m)   Wt 100 kg (220 lb 7.4 oz)   BMI 32.56 kg/m²     PHYSICAL EXAMINATION:    GENERAL: Well appearing, in no acute distress, alert and oriented x3.  PSYCH:  Mood and affect appropriate.  SKIN: Skin color, texture, turgor normal, no rashes or lesions.  HEAD/FACE:  Normocephalic, atraumatic. Cranial nerves grossly intact.  NECK: Pain to palpation over the cervical paraspinous muscles. Spurling Negative. Pain with neck flexion, extension, and lateral flexion.   CV: RRR with palpation of the radial artery.  PULM: No evidence of respiratory difficulty, symmetric chest rise.  EXTREMITIES: Peripheral joint ROM is full and pain free without obvious instability or laxity in all four extremities. No deformities, edema, or skin discoloration. Good capillary refill.  MUSCULOSKELETAL: Shoulder provocative maneuvers are negative.   Bilateral upper  extremity strength is normal and symmetric.  No atrophy or tone abnormalities are noted.  NEURO: Bilateral upper extremity coordination and muscle stretch reflexes are physiologic and symmetric.  Armaan's negative. No clonus.  No loss of sensation is noted.  GAIT: Antalgic, ambulates " without assistance         ASSESSMENT: 47 y.o. year old male with pain, consistent with     Encounter Diagnoses   Name Primary?    Cervical radiculopathy     Chronic pain syndrome     Cervical post-laminectomy syndrome        PLAN:   Because of a history of his previous cervical fusion and laminectomy at C6-7 we will perform intralaminar epidural steroid injection at T1/2 for radicular symptoms by 2, 2 weeks apart with a two-week follow-up    Continue escalation of gabapentin to goal dose of 900 mg per day in the future this can be further escalated to 1800 mg per day    I provided the patient with a [employer] informational packet to learn about stimulation as a possible treatment option in the future    Follow-up after injection to determine plan moving forward    The above plan and management options were discussed at length with patient. Patient is in agreement with the above and verbalized understanding. It will be communicated with the referring physician via electronic record, fax, or [employer].    Elba Juarez  12/15/2017

## 2018-01-03 NOTE — OP NOTE
Cervical Interlaminar Epidural Steroid Injection under Fluoroscopic Guidance.   Time-out taken to identify patient and procedure prior to starting the procedure.     Date of Procedure: 01/03/2018    PROCEDURE: Cervical Interlaminar epidural steroid injection C7/T1 under fluoroscopic guidance.     Pre-Op diagnosis: Cervical radiculopathy [M54.12]    Post-Op diagnosis: Cervical radiculopathy [M54.12]    PHYSICIAN: CHRIS MATA     ASSISTANTS: None     MEDICATIONS INJECTED: Preservative-free Decadron 10 mg with 1mL of sterile 0.25%Bupivicaine and 3ml of preservative free normal saline.     LOCAL ANESTHETIC INJECTED: Xylocaine 1% 3mL    ESTIMATED BLOOD LOSS: none.     COMPLICATIONS: none.     Interval History: patient had 50% improvement in his cervical radicular symptoms after previous injection, here today for the second injection for a potential cumulative effect of steroid in the epidural space.    TECHNIQUE: With the patient laying in a prone position, the area was prepped and draped in the usual sterile fashion using ChloraPrep and a fenestrated drape. Local anesthetic was given using a 27-gauge needle by raising a wheal and going down to the hub of the needle over the C7/T1 interlaminar space.  The interlaminar space was then approached with a 3.5 inch 18-gauge Touhy needle was introduced under fluoroscopic guidance in the AP and Lateral view. Once the Ligamentum flavum was encountered loss of resistance to saline was used to enter the epidural space. With positive loss of resistance and negative CSF or Blood, 2mL contrast dye Omnipaque (300mg/ml) was injected to confirm placement and there was no vascular runoff. The medication was then injected slowly. The patient tolerated the procedure well.       The patient was monitored after the procedure.   They were given post-procedure and discharge instructions to follow at home.  The patient was discharged in a stable condition.

## 2018-01-03 NOTE — DISCHARGE INSTRUCTIONS

## 2018-01-03 NOTE — DISCHARGE SUMMARY
Discharge Note  Short Stay      SUMMARY     Admit Date: 1/3/2018    Attending Physician: Elba Juarez    Discharge Diagnosis: Cervical radiculopathy [M54.12]    Discharge Physician: Elba Juarez      Discharge Date: 1/3/2018 10:00 AM     PROCEDURE: Cervical Interlaminar epidural steroid injection C7/T1 under fluoroscopic guidance.     Pre-Op diagnosis: Cervical radiculopathy [M54.12]    Post-Op diagnosis: Cervical radiculopathy [M54.12]    Disposition: Home or self care    Patient Instructions:   Discharge Medication List as of 1/3/2018  8:53 AM      CONTINUE these medications which have NOT CHANGED    Details   gabapentin (NEURONTIN) 300 MG capsule Take one cap PO QHS for 7 days, then one cap PO BID for the next 7 days, and then take one cap PO TID and continue, Normal      methocarbamol (ROBAXIN) 750 MG Tab Take 1 tablet (750 mg total) by mouth 3 (three) times daily as needed., Starting Tue 12/5/2017, Until Thu 1/4/2018, Normal             Resume home diet and activity

## 2018-01-25 ENCOUNTER — OFFICE VISIT (OUTPATIENT)
Dept: PAIN MEDICINE | Facility: CLINIC | Age: 48
End: 2018-01-25
Payer: COMMERCIAL

## 2018-01-25 VITALS
HEIGHT: 69 IN | BODY MASS INDEX: 33.76 KG/M2 | SYSTOLIC BLOOD PRESSURE: 137 MMHG | DIASTOLIC BLOOD PRESSURE: 88 MMHG | HEART RATE: 70 BPM | WEIGHT: 227.94 LBS | TEMPERATURE: 98 F

## 2018-01-25 DIAGNOSIS — G89.4 CHRONIC PAIN SYNDROME: ICD-10-CM

## 2018-01-25 DIAGNOSIS — M54.12 CERVICAL RADICULOPATHY: Primary | ICD-10-CM

## 2018-01-25 DIAGNOSIS — M96.1 CERVICAL POST-LAMINECTOMY SYNDROME: ICD-10-CM

## 2018-01-25 PROCEDURE — 99999 PR PBB SHADOW E&M-EST. PATIENT-LVL III: CPT | Mod: PBBFAC,,, | Performed by: NURSE PRACTITIONER

## 2018-01-25 PROCEDURE — 99213 OFFICE O/P EST LOW 20 MIN: CPT | Mod: SA,S$GLB,, | Performed by: NURSE PRACTITIONER

## 2018-01-25 NOTE — LETTER
January 25, 2018      [facility]  [address]  [facility] [address]  Phone: [phone]  Fax: [phone]       Patient: Heraclio Lam   YOB: 1970  Date of Visit: 01/25/2018    To Whom It May Concern:    Page Lam  was at [facility] on 01/25/2018. He may return to work on 01/25/2018 with no restrictions. If you have any questions or concerns, or if I can be of further assistance, please do not hesitate to contact me.    Sincerely,        Nai Knight MA

## 2018-01-25 NOTE — PROGRESS NOTES
Chronic Pain - Established Visit    Referring Physician: No ref. provider found    Chief Complaint:   No chief complaint on file.       SUBJECTIVE: Disclaimer: This note has been generated using voice-recognition software. There may be typographical errors that have been missed during proof-reading    Interval History 1/25/2018:  The patient returns today for follow up of neck and arm pain.  He is s/p T1-T2 IL BATSHEVA x2 completed on 1/3/18 with 75% relief.  His pain is now mild.  He is still having numbness to his upper extremities and lower extremities.  He is currently taking Gabapentin 300 mg QHS.  His pain today is 0/10.    Initial encounter:    Heraclio Lam presents to the clinic for the evaluation of neck pain. The pain started years ago following picking up a heavy object and symptoms have been worsening.    Brief history:  Patient has a history of ACDF at C6-7 and a history of posterior laminectomy of the cervical spine at the same levels.  In addition to his bilateral upper extremity radicular symptoms he does have bilateral lower extremity radicular symptoms.  Patient has not been having any signs of myelopathy    Pain Description:    The pain is located in the neck area and radiates to bilateral upper extremities    At BEST  0/10     At WORST  10/10 on the WORST day.      On average pain is rated as 8/10.     Today the pain is rated as 8/10    The pain is described as aching, burning, numbing, shooting, throbbing, tight band and tingling      Symptoms interfere with daily activity and sleeping.     Exacerbating factors: Sitting, Laying, Bending, Lifting and Getting out of bed/chair.      Mitigating factors medications.     Patient denies .  Patient denies any suicidal or homicidal ideations    Pain Medications:  Current:  Gabapentin 300 mg QHS  Robaxin    Tried in Past:  NSAIDs -Never  TCA -Never  SNRI -Never  Opioids-Never    Physical Therapy/Home Exercise: yes       report:  Reviewed and  consistent with medication use as prescribed.    Pain Procedures:   12/22/17 T1-2 IL BATSHEVA  1/3/18 T1-2 IL BATSHEVA- 75% relief    Chiropractor -never  Acupuncture - never  TENS unit -never  Spinal decompression - previous ACDF C6-7 previous posterior laminectomy  Joint replacement -never    Imaging:   XRAY C SPINE 12/04/2017    Narrative     X-ray cervical spine AP lateral with flexion and extension demonstrates that the patient has undergone a prior C6-C7 anterior fusion with plate and screws and posterior decompression, and the fusion appears solid.  There is normal alignment, and flexion and extension views show no instability.  T1 is not optimally viewed on the lateral views.   Impression      Postoperative changes at C6-C7, no instability     Cervical MRI 9/2017:   C6-7 ACDF.  Previous posterior laminectomy defect.  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  Bilateral NFS at C3-4.  Left C4-5 NFS.  Disc bulge centrally at C5-6.    Past Medical History:   Diagnosis Date    Arthritis      Past Surgical History:   Procedure Laterality Date    CSF SHUNT      JOINT REPLACEMENT  2006    disc Sx , cervical     Social History     Social History    Marital status: Single     Spouse name: N/A    Number of children: N/A    Years of education: N/A     Occupational History    Not on file.     Social History Main Topics    Smoking status: Never Smoker    Smokeless tobacco: Not on file    Alcohol use No    Drug use: No    Sexual activity: Not on file     Other Topics Concern    Not on file     Social History Narrative    No narrative on file     History reviewed. No pertinent family history.    Review of patient's allergies indicates:  No Known Allergies    Current Outpatient Prescriptions   Medication Sig    gabapentin (NEURONTIN) 300 MG capsule Take one cap PO QHS for 7 days, then one cap PO BID for the next 7 days, and then take one cap PO TID and continue     No current facility-administered medications for this visit.   "      REVIEW OF SYSTEMS:    GENERAL:  No weight loss, malaise or fevers.  HEENT:   No recent changes in vision or hearing  NECK:  Negative for lumps, no difficulty with swallowing.  RESPIRATORY:  Negative for cough, wheezing or shortness of breath, patient denies any recent URI.  CARDIOVASCULAR:  Negative for chest pain, leg swelling or palpitations.  GI:  Negative for abdominal discomfort, blood in stools or black stools or change in bowel habits.  MUSCULOSKELETAL:  See HPI.  SKIN:  Negative for lesions, rash, and itching.  PSYCH:  No mood disorder or recent psychosocial stressors.  Patients sleep is disturbed secondary to pain.  HEMATOLOGY/LYMPHOLOGY:  Negative for prolonged bleeding, bruising easily or swollen nodes.  Patient is not currently taking any anti-coagulants  ENDO: No history of diabetes or thyroid dysfunction  NEURO:   No history of headaches, syncope, paralysis, seizures or tremors.  All other reviewed and negative other than HPI.    OBJECTIVE:    /88   Pulse 70   Temp 98.2 °F (36.8 °C)   Ht 5' 9" (1.753 m)   Wt 103.4 kg (227 lb 15.3 oz)   BMI 33.66 kg/m²     PHYSICAL EXAMINATION:    GENERAL: Well appearing, in no acute distress, alert and oriented x3.  PSYCH:  Mood and affect appropriate.  SKIN: Skin color, texture, turgor normal, no rashes or lesions.  HEAD/FACE:  Normocephalic, atraumatic. Cranial nerves grossly intact.  NECK: No pain to palpation over the cervical paraspinous muscles.  Spurling Negative.  Pain with neck flexion.  Mild facet loading bilaterally.  CV: RRR with palpation of the radial artery.  PULM: No evidence of respiratory difficulty, symmetric chest rise.  EXTREMITIES: Peripheral joint ROM is full and pain free without obvious instability or laxity in all four extremities. No deformities, edema, or skin discoloration. Good capillary refill.  MUSCULOSKELETAL: Shoulder provocative maneuvers are negative.   Bilateral upper  extremity strength is normal and symmetric.  No " atrophy or tone abnormalities are noted.  NEURO: Bilateral upper extremity coordination and muscle stretch reflexes are physiologic and symmetric.  Armaan's negative. No clonus.  Decreased sensation to BUE and BLE.  GAIT: Antalgic- ambulates without assistance         ASSESSMENT: 47 y.o. year old male with neck pain, consistent with the following diagnoses:    Encounter Diagnoses   Name Primary?    Cervical radiculopathy Yes    Cervical post-laminectomy syndrome     Chronic pain syndrome        PLAN:     - Previous imaging was reviewed and discussed with the patient today.    - He is s/p T1-2 IL BATSHEVA x2 with significant benefit.    - He will increase Gabapentin to 300 mg TID.    - Consider [place] [facility] [facility] if pain persists despite ESIs in the future.    - The patient will continue a home exercise routine to help with pain and strengthening.      - Follow-up in 3 months or sooner if needed.    - Dr. Juarez was consulted on the patient and agrees with this plan.      The above plan and management options were discussed at length with patient. Patient is in agreement with the above and verbalized understanding.     Oly Grissom  01/25/2018

## 2018-04-26 ENCOUNTER — OFFICE VISIT (OUTPATIENT)
Dept: PAIN MEDICINE | Facility: CLINIC | Age: 48
End: 2018-04-26
Payer: COMMERCIAL

## 2018-04-26 VITALS
BODY MASS INDEX: 31.64 KG/M2 | HEIGHT: 69 IN | DIASTOLIC BLOOD PRESSURE: 86 MMHG | HEART RATE: 72 BPM | TEMPERATURE: 98 F | WEIGHT: 213.63 LBS | SYSTOLIC BLOOD PRESSURE: 131 MMHG

## 2018-04-26 DIAGNOSIS — M54.2 NECK PAIN: ICD-10-CM

## 2018-04-26 DIAGNOSIS — M47.812 CERVICAL SPONDYLOSIS WITHOUT MYELOPATHY: ICD-10-CM

## 2018-04-26 DIAGNOSIS — M50.30 DEGENERATION OF CERVICAL INTERVERTEBRAL DISC: ICD-10-CM

## 2018-04-26 DIAGNOSIS — M54.12 CERVICAL RADICULITIS: ICD-10-CM

## 2018-04-26 PROCEDURE — 99213 OFFICE O/P EST LOW 20 MIN: CPT | Mod: SA,S$GLB,, | Performed by: NURSE PRACTITIONER

## 2018-04-26 PROCEDURE — 99999 PR PBB SHADOW E&M-EST. PATIENT-LVL III: CPT | Mod: PBBFAC,,, | Performed by: NURSE PRACTITIONER

## 2018-04-26 RX ORDER — IBUPROFEN 600 MG/1
600 TABLET ORAL 3 TIMES DAILY
Refills: 0 | COMMUNITY
Start: 2018-02-07 | End: 2018-11-13

## 2018-04-26 RX ORDER — GABAPENTIN 300 MG/1
300 CAPSULE ORAL 3 TIMES DAILY
Qty: 90 CAPSULE | Refills: 5 | Status: SHIPPED | OUTPATIENT
Start: 2018-04-26 | End: 2018-11-13 | Stop reason: SDUPTHER

## 2018-04-26 NOTE — LETTER
April 26, 2018      [facility]  [address]  [place] [address]  Phone: [phone]  Fax: [phone]       Patient: Heraclio Lam   YOB: 1970  Date of Visit: 04/26/2018    To Whom It May Concern:    Page Lam  was at [facility] on 04/26/2018. He may return to work/school on 04/26/18 with no restrictions. If you have any questions or concerns, or if I can be of further assistance, please do not hesitate to contact me.    Sincerely,        Phoebe Bahena RN

## 2018-04-26 NOTE — PROGRESS NOTES
Chronic Pain - Established Visit    Referring Physician: No ref. provider found    Chief Complaint:   No chief complaint on file.       SUBJECTIVE: Disclaimer: This note has been generated using voice-recognition software. There may be typographical errors that have been missed during proof-reading    Interval History 4/26/2018:  The patient presents for follow up today.  He is still having benefit from previous ESIs.  He is happy with these results.  He continues to work and be active.  He increased Gabapentin to 900 mg at last OV which is helping.  His pain today is 0/10.    Interval History 1/25/2018:  The patient returns today for follow up of neck and arm pain.  He is s/p T1-T2 IL BATSHEVA x2 completed on 1/3/18 with 75% relief.  His pain is now mild.  He is still having numbness to his upper extremities and lower extremities.  He is currently taking Gabapentin 300 mg QHS.  His pain today is 0/10.    Initial encounter:    Heraclio Lam presents to the clinic for the evaluation of neck pain. The pain started years ago following picking up a heavy object and symptoms have been worsening.    Brief history:  Patient has a history of ACDF at C6-7 and a history of posterior laminectomy of the cervical spine at the same levels.  In addition to his bilateral upper extremity radicular symptoms he does have bilateral lower extremity radicular symptoms.  Patient has not been having any signs of myelopathy    Pain Description:    The pain is located in the neck area and radiates to bilateral upper extremities    At BEST  0/10     At WORST  10/10 on the WORST day.      On average pain is rated as 8/10.     Today the pain is rated as 8/10    The pain is described as aching, burning, numbing, shooting, throbbing, tight band and tingling      Symptoms interfere with daily activity and sleeping.     Exacerbating factors: Sitting, Laying, Bending, Lifting and Getting out of bed/chair.      Mitigating factors medications.      Patient denies .  Patient denies any suicidal or homicidal ideations    Pain Medications:  Current:  Gabapentin 300 mg TID    Tried in Past:  NSAIDs -Never  TCA -Never  SNRI -Never  Opioids-Never    Physical Therapy/Home Exercise: yes       report:  Reviewed and consistent with medication use as prescribed.    Pain Procedures:   12/22/17 T1-2 IL BATSHEVA  1/3/18 T1-2 IL BATSHEVA- 75% relief    Chiropractor -never  Acupuncture - never  TENS unit -never  Spinal decompression - previous ACDF C6-7 previous posterior laminectomy  Joint replacement -never    Imaging:   XRAY C SPINE 12/04/2017    Narrative     X-ray cervical spine AP lateral with flexion and extension demonstrates that the patient has undergone a prior C6-C7 anterior fusion with plate and screws and posterior decompression, and the fusion appears solid.  There is normal alignment, and flexion and extension views show no instability.  T1 is not optimally viewed on the lateral views.   Impression      Postoperative changes at C6-C7, no instability     Cervical MRI 9/2017:   C6-7 ACDF.  Previous posterior laminectomy defect.  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  Bilateral NFS at C3-4.  Left C4-5 NFS.  Disc bulge centrally at C5-6.    Past Medical History:   Diagnosis Date    Arthritis      Past Surgical History:   Procedure Laterality Date    CSF SHUNT      JOINT REPLACEMENT  2006    disc Sx , cervical     Social History     Social History    Marital status: Single     Spouse name: N/A    Number of children: N/A    Years of education: N/A     Occupational History    Not on file.     Social History Main Topics    Smoking status: Never Smoker    Smokeless tobacco: Not on file    Alcohol use No    Drug use: No    Sexual activity: Not on file     Other Topics Concern    Not on file     Social History Narrative    No narrative on file     No family history on file.    Review of patient's allergies indicates:  No Known Allergies    Current Outpatient  "Prescriptions   Medication Sig    gabapentin (NEURONTIN) 300 MG capsule Take one cap PO QHS for 7 days, then one cap PO BID for the next 7 days, and then take one cap PO TID and continue     No current facility-administered medications for this visit.        REVIEW OF SYSTEMS:    GENERAL:  No weight loss, malaise or fevers.  HEENT:   No recent changes in vision or hearing  NECK:  Negative for lumps, no difficulty with swallowing.  RESPIRATORY:  Negative for cough, wheezing or shortness of breath, patient denies any recent URI.  CARDIOVASCULAR:  Negative for chest pain, leg swelling or palpitations.  GI:  Negative for abdominal discomfort, blood in stools or black stools or change in bowel habits.  MUSCULOSKELETAL:  See HPI.  SKIN:  Negative for lesions, rash, and itching.  PSYCH:  No mood disorder or recent psychosocial stressors.  Patients sleep is disturbed secondary to pain.  HEMATOLOGY/LYMPHOLOGY:  Negative for prolonged bleeding, bruising easily or swollen nodes.  Patient is not currently taking any anti-coagulants  ENDO: No history of diabetes or thyroid dysfunction  NEURO:   No history of headaches, syncope, paralysis, seizures or tremors.  All other reviewed and negative other than HPI.    OBJECTIVE:    /86   Pulse 72   Temp 98.1 °F (36.7 °C)   Ht 5' 9" (1.753 m)   Wt 96.9 kg (213 lb 10 oz)   BMI 31.55 kg/m²     PHYSICAL EXAMINATION:    GENERAL: Well appearing, in no acute distress, alert and oriented x3.  PSYCH:  Mood and affect appropriate.  SKIN: Skin color, texture, turgor normal, no rashes or lesions.  HEAD/FACE:  Normocephalic, atraumatic. Cranial nerves grossly intact.  NECK: No pain to palpation over the cervical paraspinous muscles.  Spurling Negative.  No pain with neck flexion.  Mild facet loading bilaterally.  CV: RRR with palpation of the radial artery.  PULM: No evidence of respiratory difficulty, symmetric chest rise.  EXTREMITIES: Peripheral joint ROM is full and pain free without " obvious instability or laxity in all four extremities. No deformities, edema, or skin discoloration. Good capillary refill.  MUSCULOSKELETAL: Shoulder provocative maneuvers are negative.   Bilateral upper  extremity strength is normal and symmetric.  No atrophy or tone abnormalities are noted.  NEURO: Bilateral upper extremity coordination and muscle stretch reflexes are physiologic and symmetric.  Armaan's negative. No clonus.  Decreased sensation to BUE and BLE.  GAIT: Antalgic- ambulates without assistance         ASSESSMENT: 47 y.o. year old male with neck pain, consistent with the following diagnoses:    Encounter Diagnoses   Name Primary?    Cervical radiculitis     Cervical spondylosis without myelopathy     Degeneration of cervical intervertebral disc     Neck pain        PLAN:     - Previous imaging was reviewed and discussed with the patient today.    - Can repeat T1-2 IL BATSHEVA if needed.    - Continue Gabapentin 300 mg TID.     - The patient will continue a home exercise routine to help with pain and strengthening.      - Follow-up as needed.        The above plan and management options were discussed at length with patient. Patient is in agreement with the above and verbalized understanding.     Oly Grissom  04/26/2018

## 2018-11-13 ENCOUNTER — OFFICE VISIT (OUTPATIENT)
Dept: PAIN MEDICINE | Facility: CLINIC | Age: 48
End: 2018-11-13
Payer: COMMERCIAL

## 2018-11-13 VITALS
HEART RATE: 75 BPM | TEMPERATURE: 98 F | DIASTOLIC BLOOD PRESSURE: 91 MMHG | SYSTOLIC BLOOD PRESSURE: 123 MMHG | WEIGHT: 207.19 LBS | HEIGHT: 69 IN | BODY MASS INDEX: 30.69 KG/M2 | RESPIRATION RATE: 18 BRPM

## 2018-11-13 DIAGNOSIS — M54.12 CERVICAL RADICULITIS: ICD-10-CM

## 2018-11-13 DIAGNOSIS — M54.12 CERVICAL RADICULOPATHY: Primary | ICD-10-CM

## 2018-11-13 DIAGNOSIS — M47.812 CERVICAL SPONDYLOSIS WITHOUT MYELOPATHY: ICD-10-CM

## 2018-11-13 DIAGNOSIS — M54.2 NECK PAIN: ICD-10-CM

## 2018-11-13 DIAGNOSIS — M50.30 DEGENERATION OF CERVICAL INTERVERTEBRAL DISC: ICD-10-CM

## 2018-11-13 PROCEDURE — 3008F BODY MASS INDEX DOCD: CPT | Mod: CPTII,S$GLB,, | Performed by: NURSE PRACTITIONER

## 2018-11-13 PROCEDURE — 99213 OFFICE O/P EST LOW 20 MIN: CPT | Mod: S$GLB,,, | Performed by: NURSE PRACTITIONER

## 2018-11-13 PROCEDURE — 99999 PR PBB SHADOW E&M-EST. PATIENT-LVL III: CPT | Mod: PBBFAC,,, | Performed by: NURSE PRACTITIONER

## 2018-11-13 RX ORDER — GABAPENTIN 300 MG/1
600 CAPSULE ORAL 3 TIMES DAILY
Qty: 180 CAPSULE | Refills: 2 | Status: SHIPPED | OUTPATIENT
Start: 2018-11-13 | End: 2019-02-11

## 2018-11-13 RX ORDER — GABAPENTIN 300 MG/1
CAPSULE ORAL
Qty: 90 CAPSULE | Refills: 5 | Status: SHIPPED | OUTPATIENT
Start: 2018-11-13 | End: 2018-11-13 | Stop reason: SDUPTHER

## 2018-11-13 NOTE — LETTER
November 13, 2018      [facility]  [address]  [facility] [address]  Phone: [phone]  Fax: [phone]       Patient: Heraclio Lam   YOB: 1970  Date of Visit: 11/13/2018    To Whom It May Concern:    Page Lam  was at [facility] on 11/13/2018. He may return to work/school on 11/14/18 with no restrictions. If you have any questions or concerns, or if I can be of further assistance, please do not hesitate to contact me.        Sincerely,      Nai Knight MA

## 2018-11-13 NOTE — H&P (VIEW-ONLY)
Chronic Pain - Established Visit    Referring Physician: No ref. provider found    Chief Complaint:   Chief Complaint   Patient presents with    Neck Pain        SUBJECTIVE: Disclaimer: This note has been generated using voice-recognition software. There may be typographical errors that have been missed during proof-reading    Interval History 11/13/2018:  The patient returns for follow up of neck pain.  He previously was doing well after T1-T2 IL BATSHEVA x2 completed on 1/3/18.  He reports that he has been having increased pain for about one month.  The pain starts to the neck and radiates into both arms with associated numbness.  This is similar to previous pain.  He denies any new onset weakness.  He denies any b/b changes.  He has been taking Gabapentin 900 mg daily with limited benefit.  He has persistent numbness and burning.  His pain today is 10/10.    Interval History 4/26/2018:  The patient presents for follow up today.  He is still having benefit from previous ESIs.  He is happy with these results.  He continues to work and be active.  He increased Gabapentin to 900 mg at last OV which is helping.  His pain today is 0/10.    Interval History 1/25/2018:  The patient returns today for follow up of neck and arm pain.  He is s/p T1-T2 IL BATSHEVA x2 completed on 1/3/18 with 75% relief.  His pain is now mild.  He is still having numbness to his upper extremities and lower extremities.  He is currently taking Gabapentin 300 mg QHS.  His pain today is 0/10.    Initial encounter:    Heraclio Lam presents to the clinic for the evaluation of neck pain. The pain started years ago following picking up a heavy object and symptoms have been worsening.    Brief history:  Patient has a history of ACDF at C6-7 and a history of posterior laminectomy of the cervical spine at the same levels.  In addition to his bilateral upper extremity radicular symptoms he does have bilateral lower extremity radicular symptoms.  Patient  has not been having any signs of myelopathy    Pain Description:    The pain is located in the neck area and radiates to bilateral upper extremities    At BEST  0/10     At WORST  10/10 on the WORST day.      On average pain is rated as 8/10.     Today the pain is rated as 8/10    The pain is described as aching, burning, numbing, shooting, throbbing, tight band and tingling      Symptoms interfere with daily activity and sleeping.     Exacerbating factors: Sitting, Laying, Bending, Lifting and Getting out of bed/chair.      Mitigating factors medications.     Patient denies .  Patient denies any suicidal or homicidal ideations    Pain Medications:  Current:  Gabapentin 300 mg TID    Tried in Past:  NSAIDs -Never  TCA -Never  SNRI -Never  Opioids-Never    Physical Therapy/Home Exercise: yes       report:  Reviewed and consistent with medication use as prescribed.    Pain Procedures:   12/22/17 T1-2 IL BATSHEVA  1/3/18 T1-2 IL BATSHEVA- 75% relief    Chiropractor -never  Acupuncture - never  TENS unit -never  Spinal decompression - previous ACDF C6-7 previous posterior laminectomy  Joint replacement -never    Imaging:   XRAY C SPINE 12/04/2017    Narrative     X-ray cervical spine AP lateral with flexion and extension demonstrates that the patient has undergone a prior C6-C7 anterior fusion with plate and screws and posterior decompression, and the fusion appears solid.  There is normal alignment, and flexion and extension views show no instability.  T1 is not optimally viewed on the lateral views.   Impression      Postoperative changes at C6-C7, no instability     Cervical MRI 9/2017:   C6-7 ACDF.  Previous posterior laminectomy defect.  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  Bilateral NFS at C3-4.  Left C4-5 NFS.  Disc bulge centrally at C5-6.    Past Medical History:   Diagnosis Date    Arthritis      Past Surgical History:   Procedure Laterality Date    CSF SHUNT      INJECTION-STEROID-EPIDURAL-CERVICAL N/A 1/3/2018     Performed by Elba Juarez [facility] at [facility]    INJECTION-STEROID-EPIDURAL-CERVICAL N/A 12/22/2017    Performed [facility] at [facility]    JOINT REPLACEMENT  2006    disc Sx , cervical     Social History     Socioeconomic History    Marital status: Single     Spouse name: Not on file    Number of children: Not on file    Years of education: Not on file    Highest education level: Not on file   Social Needs    Financial resource strain: Not on file    Food insecurity - worry: Not on file    Food insecurity - inability: Not on file    Transportation needs - medical: Not on file    Transportation needs - non-medical: Not on file   Occupational History    Not on file   Tobacco Use    Smoking status: Never Smoker   Substance and Sexual Activity    Alcohol use: No    Drug use: No    Sexual activity: Not on file   Other Topics Concern    Not on file   Social History Narrative    Not on file     No family history on file.    Review of patient's allergies indicates:  No Known Allergies    Current Outpatient Medications   Medication Sig    gabapentin (NEURONTIN) 300 MG capsule TAKE ONE CAPSULE BY MOUTH 3 TIMES A DAY     No current facility-administered medications for this visit.        REVIEW OF SYSTEMS:    GENERAL:  No weight loss, malaise or fevers.  HEENT:   No recent changes in vision or hearing  NECK:  Negative for lumps, no difficulty with swallowing.  RESPIRATORY:  Negative for cough, wheezing or shortness of breath, patient denies any recent URI.  CARDIOVASCULAR:  Negative for chest pain, leg swelling or palpitations.  GI:  Negative for abdominal discomfort, blood in stools or black stools or change in bowel habits.  MUSCULOSKELETAL:  See HPI.  SKIN:  Negative for lesions, rash, and itching.  PSYCH:  No mood disorder or recent psychosocial stressors.  Patients sleep is disturbed secondary to pain.  HEMATOLOGY/LYMPHOLOGY:  Negative for prolonged bleeding, bruising easily or  "swollen nodes.  Patient is not currently taking any anti-coagulants  ENDO: No history of diabetes or thyroid dysfunction  NEURO:   No history of headaches, syncope, paralysis, seizures or tremors.  All other reviewed and negative other than HPI.    OBJECTIVE:    BP (!) 123/91   Pulse 75   Temp 98.2 °F (36.8 °C) (Oral)   Resp 18   Ht 5' 9" (1.753 m)   Wt 94 kg (207 lb 3.2 oz)   BMI 30.60 kg/m²     PHYSICAL EXAMINATION:    GENERAL: Well appearing, in no acute distress, alert and oriented x3.  PSYCH:  Mood and affect appropriate.  SKIN: Skin color, texture, turgor normal, no rashes or lesions.  HEAD/FACE:  Normocephalic, atraumatic. Cranial nerves grossly intact.  NECK: No pain to palpation over the cervical paraspinous muscles.  Spurling positive on the left.  There is pain with neck flexion and extension.  Mild facet loading bilaterally.  CV: RRR with palpation of the radial artery.  PULM: No evidence of respiratory difficulty, symmetric chest rise.  EXTREMITIES: Peripheral joint ROM is full and pain free without obvious instability or laxity in all four extremities. No deformities, edema, or skin discoloration. Good capillary refill.  MUSCULOSKELETAL: Shoulder provocative maneuvers are negative.   Bilateral upper  extremity strength is normal and symmetric.  No atrophy or tone abnormalities are noted.  NEURO: Bilateral upper extremity coordination and muscle stretch reflexes are physiologic and symmetric.  Armaan's negative. No clonus.  Decreased sensation to BUE and BLE.  GAIT: Antalgic- ambulates without assistance         ASSESSMENT: 48 y.o. year old male with neck pain, consistent with the following diagnoses:    Encounter Diagnoses   Name Primary?    Cervical radiculitis     Cervical spondylosis without myelopathy     Degeneration of cervical intervertebral disc     Neck pain        PLAN:     - Previous imaging was reviewed and discussed with the patient today.    - Will schedule for repeat T1-2 IL " BATSHEVA.  The procedure, risks, benefits and options were discussed with patient. There are no contraindications to the procedure. The patient expressed understanding and agreed to proceed.      - Consider [place] SCS trial as previously discussed with Dr. Juarez.    - Increase Gabapentin 300 mg to 2 pills TID.  He was given titration directions today.    - The patient will continue a home exercise routine to help with pain and strengthening.      - Follow-up 3 weeks after procedure.      The above plan and management options were discussed at length with patient. Patient is in agreement with the above and verbalized understanding.     Oly Grissom  11/13/2018

## 2018-11-13 NOTE — PROGRESS NOTES
Chronic Pain - Established Visit    Referring Physician: No ref. provider found    Chief Complaint:   Chief Complaint   Patient presents with    Neck Pain        SUBJECTIVE: Disclaimer: This note has been generated using voice-recognition software. There may be typographical errors that have been missed during proof-reading    Interval History 11/13/2018:  The patient returns for follow up of neck pain.  He previously was doing well after T1-T2 IL BATSHEVA x2 completed on 1/3/18.  He reports that he has been having increased pain for about one month.  The pain starts to the neck and radiates into both arms with associated numbness.  This is similar to previous pain.  He denies any new onset weakness.  He denies any b/b changes.  He has been taking Gabapentin 900 mg daily with limited benefit.  He has persistent numbness and burning.  His pain today is 10/10.    Interval History 4/26/2018:  The patient presents for follow up today.  He is still having benefit from previous ESIs.  He is happy with these results.  He continues to work and be active.  He increased Gabapentin to 900 mg at last OV which is helping.  His pain today is 0/10.    Interval History 1/25/2018:  The patient returns today for follow up of neck and arm pain.  He is s/p T1-T2 IL BATSHVEA x2 completed on 1/3/18 with 75% relief.  His pain is now mild.  He is still having numbness to his upper extremities and lower extremities.  He is currently taking Gabapentin 300 mg QHS.  His pain today is 0/10.    Initial encounter:    Heraclio Lam presents to the clinic for the evaluation of neck pain. The pain started years ago following picking up a heavy object and symptoms have been worsening.    Brief history:  Patient has a history of ACDF at C6-7 and a history of posterior laminectomy of the cervical spine at the same levels.  In addition to his bilateral upper extremity radicular symptoms he does have bilateral lower extremity radicular symptoms.  Patient  has not been having any signs of myelopathy    Pain Description:    The pain is located in the neck area and radiates to bilateral upper extremities    At BEST  0/10     At WORST  10/10 on the WORST day.      On average pain is rated as 8/10.     Today the pain is rated as 8/10    The pain is described as aching, burning, numbing, shooting, throbbing, tight band and tingling      Symptoms interfere with daily activity and sleeping.     Exacerbating factors: Sitting, Laying, Bending, Lifting and Getting out of bed/chair.      Mitigating factors medications.     Patient denies .  Patient denies any suicidal or homicidal ideations    Pain Medications:  Current:  Gabapentin 300 mg TID    Tried in Past:  NSAIDs -Never  TCA -Never  SNRI -Never  Opioids-Never    Physical Therapy/Home Exercise: yes       report:  Reviewed and consistent with medication use as prescribed.    Pain Procedures:   12/22/17 T1-2 IL BATSHEVA  1/3/18 T1-2 IL BATSHEVA- 75% relief    Chiropractor -never  Acupuncture - never  TENS unit -never  Spinal decompression - previous ACDF C6-7 previous posterior laminectomy  Joint replacement -never    Imaging:   XRAY C SPINE 12/04/2017    Narrative     X-ray cervical spine AP lateral with flexion and extension demonstrates that the patient has undergone a prior C6-C7 anterior fusion with plate and screws and posterior decompression, and the fusion appears solid.  There is normal alignment, and flexion and extension views show no instability.  T1 is not optimally viewed on the lateral views.   Impression      Postoperative changes at C6-C7, no instability     Cervical MRI 9/2017:   C6-7 ACDF.  Previous posterior laminectomy defect.  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  Bilateral NFS at C3-4.  Left C4-5 NFS.  Disc bulge centrally at C5-6.    Past Medical History:   Diagnosis Date    Arthritis      Past Surgical History:   Procedure Laterality Date    CSF SHUNT      INJECTION-STEROID-EPIDURAL-CERVICAL N/A 1/3/2018     Performed by [facility] at [facility]    INJECTION-STEROID-EPIDURAL-CERVICAL N/A 12/22/2017    [facility]formed [facility] at [facility]    JOINT REPLACEMENT  2006    disc Sx , cervical     Social History     Socioeconomic History    Marital status: Single     Spouse name: Not on file    Number of children: Not on file    Years of education: Not on file    Highest education level: Not on file   Social Needs    Financial resource strain: Not on file    Food insecurity - worry: Not on file    Food insecurity - inability: Not on file    Transportation needs - medical: Not on file    Transportation needs - non-medical: Not on file   Occupational History    Not on file   Tobacco Use    Smoking status: Never Smoker   Substance and Sexual Activity    Alcohol use: No    Drug use: No    Sexual activity: Not on file   Other Topics Concern    Not on file   Social History Narrative    Not on file     No family history on file.    Review of patient's allergies indicates:  No Known Allergies    Current Outpatient Medications   Medication Sig    gabapentin (NEURONTIN) 300 MG capsule TAKE ONE CAPSULE BY MOUTH 3 TIMES A DAY     No current facility-administered medications for this visit.        REVIEW OF SYSTEMS:    GENERAL:  No weight loss, malaise or fevers.  HEENT:   No recent changes in vision or hearing  NECK:  Negative for lumps, no difficulty with swallowing.  RESPIRATORY:  Negative for cough, wheezing or shortness of breath, patient denies any recent URI.  CARDIOVASCULAR:  Negative for chest pain, leg swelling or palpitations.  GI:  Negative for abdominal discomfort, blood in stools or black stools or change in bowel habits.  MUSCULOSKELETAL:  See HPI.  SKIN:  Negative for lesions, rash, and itching.  PSYCH:  No mood disorder or recent psychosocial stressors.  Patients sleep is disturbed secondary to pain.  HEMATOLOGY/LYMPHOLOGY:  Negative for prolonged bleeding, bruising easily or  "swollen nodes.  Patient is not currently taking any anti-coagulants  ENDO: No history of diabetes or thyroid dysfunction  NEURO:   No history of headaches, syncope, paralysis, seizures or tremors.  All other reviewed and negative other than HPI.    OBJECTIVE:    BP (!) 123/91   Pulse 75   Temp 98.2 °F (36.8 °C) (Oral)   Resp 18   Ht 5' 9" (1.753 m)   Wt 94 kg (207 lb 3.2 oz)   BMI 30.60 kg/m²     PHYSICAL EXAMINATION:    GENERAL: Well appearing, in no acute distress, alert and oriented x3.  PSYCH:  Mood and affect appropriate.  SKIN: Skin color, texture, turgor normal, no rashes or lesions.  HEAD/FACE:  Normocephalic, atraumatic. Cranial nerves grossly intact.  NECK: No pain to palpation over the cervical paraspinous muscles.  Spurling positive on the left.  There is pain with neck flexion and extension.  Mild facet loading bilaterally.  CV: RRR with palpation of the radial artery.  PULM: No evidence of respiratory difficulty, symmetric chest rise.  EXTREMITIES: Peripheral joint ROM is full and pain free without obvious instability or laxity in all four extremities. No deformities, edema, or skin discoloration. Good capillary refill.  MUSCULOSKELETAL: Shoulder provocative maneuvers are negative.   Bilateral upper  extremity strength is normal and symmetric.  No atrophy or tone abnormalities are noted.  NEURO: Bilateral upper extremity coordination and muscle stretch reflexes are physiologic and symmetric.  Armaan's negative. No clonus.  Decreased sensation to BUE and BLE.  GAIT: Antalgic- ambulates without assistance         ASSESSMENT: 48 y.o. year old male with neck pain, consistent with the following diagnoses:    Encounter Diagnoses   Name Primary?    Cervical radiculitis     Cervical spondylosis without myelopathy     Degeneration of cervical intervertebral disc     Neck pain        PLAN:     - Previous imaging was reviewed and discussed with the patient today.    - Will schedule for repeat T1-2 IL " BATSHEVA.  The procedure, risks, benefits and options were discussed with patient. There are no contraindications to the procedure. The patient expressed understanding and agreed to proceed.      - Consider Burst SCS trial as previously discussed with Dr. Juarez.    - Increase Gabapentin 300 mg to 2 pills TID.  He was given titration directions today.    - The patient will continue a home exercise routine to help with pain and strengthening.      - Follow-up 3 weeks after procedure.      The above plan and management options were discussed at length with patient. Patient is in agreement with the above and verbalized understanding.     Oly Grissom  11/13/2018

## 2018-12-04 ENCOUNTER — HOSPITAL ENCOUNTER (OUTPATIENT)
Facility: OTHER | Age: 48
Discharge: HOME | End: 2018-12-04
Attending: ANESTHESIOLOGY | Admitting: ANESTHESIOLOGY
Payer: COMMERCIAL

## 2018-12-04 VITALS
RESPIRATION RATE: 20 BRPM | OXYGEN SATURATION: 96 % | BODY MASS INDEX: 30.66 KG/M2 | TEMPERATURE: 98 F | WEIGHT: 207 LBS | HEART RATE: 64 BPM | HEIGHT: 69 IN | DIASTOLIC BLOOD PRESSURE: 79 MMHG | SYSTOLIC BLOOD PRESSURE: 122 MMHG

## 2018-12-04 DIAGNOSIS — G89.29 CHRONIC PAIN: ICD-10-CM

## 2018-12-04 DIAGNOSIS — M54.12 CERVICAL RADICULOPATHY: Primary | ICD-10-CM

## 2018-12-04 PROCEDURE — 62321 NJX INTERLAMINAR CRV/THRC: CPT | Performed by: ANESTHESIOLOGY

## 2018-12-04 PROCEDURE — 25000003 PHARM REV CODE 250: Performed by: STUDENT IN AN ORGANIZED HEALTH CARE EDUCATION/TRAINING PROGRAM

## 2018-12-04 PROCEDURE — 25000003 PHARM REV CODE 250: Performed by: ANESTHESIOLOGY

## 2018-12-04 PROCEDURE — 25500020 PHARM REV CODE 255: Performed by: ANESTHESIOLOGY

## 2018-12-04 PROCEDURE — 63600175 PHARM REV CODE 636 W HCPCS: Performed by: ANESTHESIOLOGY

## 2018-12-04 PROCEDURE — 62321 NJX INTERLAMINAR CRV/THRC: CPT | Mod: ,,, | Performed by: ANESTHESIOLOGY

## 2018-12-04 PROCEDURE — 99152 MOD SED SAME PHYS/QHP 5/>YRS: CPT | Mod: ,,, | Performed by: ANESTHESIOLOGY

## 2018-12-04 RX ORDER — MIDAZOLAM HYDROCHLORIDE 1 MG/ML
INJECTION INTRAMUSCULAR; INTRAVENOUS
Status: DISCONTINUED | OUTPATIENT
Start: 2018-12-04 | End: 2018-12-04 | Stop reason: HOSPADM

## 2018-12-04 RX ORDER — SODIUM CHLORIDE 9 MG/ML
500 INJECTION, SOLUTION INTRAVENOUS CONTINUOUS
Status: DISCONTINUED | OUTPATIENT
Start: 2018-12-04 | End: 2018-12-04 | Stop reason: HOSPADM

## 2018-12-04 RX ORDER — BUPIVACAINE HYDROCHLORIDE 2.5 MG/ML
INJECTION, SOLUTION EPIDURAL; INFILTRATION; INTRACAUDAL
Status: DISCONTINUED | OUTPATIENT
Start: 2018-12-04 | End: 2018-12-04 | Stop reason: HOSPADM

## 2018-12-04 RX ORDER — DEXAMETHASONE SODIUM PHOSPHATE 10 MG/ML
INJECTION INTRAMUSCULAR; INTRAVENOUS
Status: DISCONTINUED | OUTPATIENT
Start: 2018-12-04 | End: 2018-12-04 | Stop reason: HOSPADM

## 2018-12-04 RX ORDER — LIDOCAINE HYDROCHLORIDE 10 MG/ML
INJECTION INFILTRATION; PERINEURAL
Status: DISCONTINUED | OUTPATIENT
Start: 2018-12-04 | End: 2018-12-04 | Stop reason: HOSPADM

## 2018-12-04 RX ADMIN — SODIUM CHLORIDE 500 ML: 0.9 INJECTION, SOLUTION INTRAVENOUS at 08:12

## 2018-12-04 NOTE — OP NOTE
Cervical Interlaminar Epidural Steroid Injection under Fluoroscopic Guidance.   Time-out taken to identify patient and procedure prior to starting the procedure.     Date of Procedure: 12/04/2018    PROCEDURE: Cervical Interlaminar epidural steroid injection C7/T1 under fluoroscopic guidance.     Pre-Op diagnosis: Cervical radiculopathy [M54.12]    Post-Op diagnosis: Cervical radiculopathy [M54.12]    PHYSICIAN: CHRIS MATA     Assistants:   Carine Ellis MD, PGY-5, Pain Fellow  I was present and supervising all critical portions of the procedure     MEDICATIONS INJECTED: Preservative-free Decadron 10 mg with 1mL of sterile 0.25%Bupivicaine and 3ml of preservative free normal saline.     LOCAL ANESTHETIC INJECTED: Xylocaine 1% 3mL    ESTIMATED BLOOD LOSS: none.     COMPLICATIONS: none.     TECHNIQUE: With the patient laying in a prone position, the area was prepped and draped in the usual sterile fashion using ChloraPrep and a fenestrated drape. Local anesthetic was given using a 27-gauge needle by raising a wheal and going down to the hub of the needle over the T1/T2 interlaminar space.  The interlaminar space was then approached with a 3.5 inch 18-gauge Touhy needle was introduced under fluoroscopic guidance in the AP and Lateral view. Once the Ligamentum flavum was encountered loss of resistance to saline was used to enter the epidural space. With positive loss of resistance and negative CSF or Blood, 2mL contrast dye Omnipaque (300mg/ml) was injected to confirm placement and there was no vascular runoff. The medication was then injected slowly. The patient tolerated the procedure well.     Conscious sedation provided by M.D    The patient was monitored with continuous pulse oximetry, EKG, and intermittent blood pressure monitors.  The patient was hemodynamically stable throughout the entire process was responsive to voice, and breathing spontaneously.  Supplemental O2 was provided at 2L/min via nasal  cannula.  Patient was comfortable for the duration of the procedure. (See nurse documentation and case log for sedation time)    There was a total of 2mg IV Midazolam was titrated for the procedure        The patient was monitored after the procedure.   They were given post-procedure and discharge instructions to follow at home.  The patient was discharged in a stable condition.

## 2018-12-04 NOTE — DISCHARGE SUMMARY
Discharge Note  Short Stay      SUMMARY     Admit Date: 12/4/2018    Attending Physician: Elba Juarez      Discharge Physician: Elba Juarez      Discharge Date: 12/4/2018 9:32 AM    Procedure(s) (LRB):  Injection, Steroid, Epidural CERVICAL T1-2 INTERLAMINAR BATSHEVA (N/A)    Final Diagnosis: Cervical radiculopathy [M54.12]    Disposition: Home or self care    Patient Instructions:   Current Discharge Medication List      CONTINUE these medications which have NOT CHANGED    Details   gabapentin (NEURONTIN) 300 MG capsule Take 2 capsules (600 mg total) by mouth 3 (three) times daily.  Qty: 180 capsule, Refills: 2    Associated Diagnoses: Cervical radiculitis; Cervical spondylosis without myelopathy; Degeneration of cervical intervertebral disc; Neck pain                 Discharge Diagnosis: Cervical radiculopathy [M54.12]  Condition on Discharge: Stable with no complications to procedure   Diet on Discharge: Same as before.  Activity: as per instruction sheet.  Discharge to: Home with a responsible adult.  Follow up: 2-4 weeks

## 2018-12-04 NOTE — DISCHARGE INSTRUCTIONS
Thank you for allowing us to care for you today. You may receive a survey about the care we provided. Your feedback is valuable and helps us provide excellent care throughout the community.     Home Care Instructions for Pain Management:    1. DIET:   You may resume your normal diet today.   2. BATHING:   You may shower with luke warm water. No tub baths or anything that will soak injection sites under water for the next 24 hours.  3. DRESSING:   You may remove your bandage today.   4. ACTIVITY LEVEL:   You may resume your normal activities 24 hrs after your procedure. Nothing strenuous today.  5. MEDICATIONS:   You may resume your normal medications today. To restart blood thinners, ask your doctor.  6. DRIVING    If you have received any sedatives by mouth today, you may not drive for 12 hours.    If you have received any sedation through your IV, you may not drive for 24 hrs.   7. SPECIAL INSTRUCTIONS:   No heat to the injection site for 24 hrs including, hot bath or shower, heating pad, moist heat, or hot tubs.    Use ice pack to injection site for any pain or discomfort.  Apply ice packs for 20 minute intervals as needed.    IF you have diabetes, be sure to monitor your blood sugar more closely. IF your injection contained steroids your blood sugar levels may become higher than normal.    If you are still having pain upon discharge:  Your pain may improve over the next 48 hours. The anesthetic (numbing medication) works immediately to 48 hours. IF your injection contained a steroid (anti-inflammatory medication), it takes approximately 3 days to start feeling relief and 7-10 days to see your greatest results from the medication. It is possible you may need subsequent injections. This would be discussed at your follow up appointment with pain management or your referring doctor.      PLEASE CALL YOUR DOCTOR IF:  1. Redness or swelling around the injection site.  2. Fever of 101 degrees or more  3. Drainage  (pus) from the injection site.  4. For any continuous bleeding (some dried blood over the incision is normal.)    FOR EMERGENCIES:   If any unusual problems or difficulties occur during clinic hours, call [phone] or [phone]. Adult Procedural Sedation Instructions    Recovery After Procedural Sedation (Adult)  You have been given medicine by vein to make you sleep during your surgery. This may have included both a pain medicine and sleeping medicine. Most of the effects have worn off. But you may still have some drowsiness for the next 6 to 8 hours.  Home care  Follow these guidelines when you get home:  · For the next 8 hours, you should be watched by a responsible adult. This person should make sure your condition is not getting worse.  · Don't drink any alcohol for the next 24 hours.  · Don't drive, operate dangerous machinery, or make important business or personal decisions during the next 24 hours.  Note: Your healthcare provider may tell you not to take any medicine by mouth for pain or sleep in the next 4 hours. These medicines may react with the medicines you were given in the hospital. This could cause a much stronger response than usual.  Follow-up care  Follow up with your healthcare provider if you are not alert and back to your usual level of activity within 12 hours.  When to seek medical advice  Call your healthcare provider right away if any of these occur:  · Drowsiness gets worse  · Weakness or dizziness gets worse  · Repeated vomiting  · You can't be awakened   Date Last Reviewed: 10/18/2016  © [id number] [employer]. [address] [place] [address], [place] [address]. All rights reserved. This information is not intended as a substitute for professional medical care. Always follow your healthcare professional's instructions.

## 2018-12-26 ENCOUNTER — OFFICE VISIT (OUTPATIENT)
Dept: PAIN MEDICINE | Facility: CLINIC | Age: 48
End: 2018-12-26
Payer: COMMERCIAL

## 2018-12-26 VITALS
HEART RATE: 75 BPM | TEMPERATURE: 98 F | WEIGHT: 207 LBS | BODY MASS INDEX: 30.66 KG/M2 | DIASTOLIC BLOOD PRESSURE: 65 MMHG | SYSTOLIC BLOOD PRESSURE: 109 MMHG | HEIGHT: 69 IN

## 2018-12-26 DIAGNOSIS — M47.812 CERVICAL SPONDYLOSIS WITHOUT MYELOPATHY: ICD-10-CM

## 2018-12-26 DIAGNOSIS — M96.1 CERVICAL POST-LAMINECTOMY SYNDROME: ICD-10-CM

## 2018-12-26 DIAGNOSIS — M50.30 DEGENERATION OF CERVICAL INTERVERTEBRAL DISC: ICD-10-CM

## 2018-12-26 DIAGNOSIS — M54.12 CERVICAL RADICULOPATHY: Primary | ICD-10-CM

## 2018-12-26 PROCEDURE — 99213 OFFICE O/P EST LOW 20 MIN: CPT | Mod: S$GLB,,, | Performed by: NURSE PRACTITIONER

## 2018-12-26 PROCEDURE — 3008F BODY MASS INDEX DOCD: CPT | Mod: CPTII,S$GLB,, | Performed by: NURSE PRACTITIONER

## 2018-12-26 PROCEDURE — 99999 PR PBB SHADOW E&M-EST. PATIENT-LVL III: CPT | Mod: PBBFAC,,, | Performed by: NURSE PRACTITIONER

## 2018-12-26 NOTE — H&P (VIEW-ONLY)
Chronic Pain - Established Visit    Referring Physician: No ref. provider found    Chief Complaint:   No chief complaint on file.       SUBJECTIVE: Disclaimer: This note has been generated using voice-recognition software. There may be typographical errors that have been missed during proof-reading    Interval History 12/26/2018:  The patient presents for injection follow up.  He is s/p repeat T1-2 IL BATSHEVA on 12/4/18 with about 60% relief.  He is reporting pain to the neck and into the arms still.  Last year, he required 2 ESIs for significant benefit.  He would like to schedule a repeat.  His pain today is 9/10.    Interval History 11/13/2018:  The patient returns for follow up of neck pain.  He previously was doing well after T1-T2 IL BATSHEVA x2 completed on 1/3/18.  He reports that he has been having increased pain for about one month.  The pain starts to the neck and radiates into both arms with associated numbness.  This is similar to previous pain.  He denies any new onset weakness.  He denies any b/b changes.  He has been taking Gabapentin 900 mg daily with limited benefit.  He has persistent numbness and burning.  His pain today is 10/10.    Interval History 4/26/2018:  The patient presents for follow up today.  He is still having benefit from previous ESIs.  He is happy with these results.  He continues to work and be active.  He increased Gabapentin to 900 mg at last OV which is helping.  His pain today is 0/10.    Interval History 1/25/2018:  The patient returns today for follow up of neck and arm pain.  He is s/p T1-T2 IL BATSHEVA x2 completed on 1/3/18 with 75% relief.  His pain is now mild.  He is still having numbness to his upper extremities and lower extremities.  He is currently taking Gabapentin 300 mg QHS.  His pain today is 0/10.    Initial encounter:    Heraclio CONROY Genaro presents to the clinic for the evaluation of neck pain. The pain started years ago following picking up a heavy object and symptoms  have been worsening.    Brief history:  Patient has a history of ACDF at C6-7 and a history of posterior laminectomy of the cervical spine at the same levels.  In addition to his bilateral upper extremity radicular symptoms he does have bilateral lower extremity radicular symptoms.  Patient has not been having any signs of myelopathy    Pain Description:    The pain is located in the neck area and radiates to bilateral upper extremities    At BEST  0/10     At WORST  10/10 on the WORST day.      On average pain is rated as 8/10.     Today the pain is rated as 8/10    The pain is described as aching, burning, numbing, shooting, throbbing, tight band and tingling      Symptoms interfere with daily activity and sleeping.     Exacerbating factors: Sitting, Laying, Bending, Lifting and Getting out of bed/chair.      Mitigating factors medications.     Patient denies .  Patient denies any suicidal or homicidal ideations    Pain Medications:  Current:  Gabapentin 600 mg TID    Tried in Past:  NSAIDs -Never  TCA -Never  SNRI -Never  Opioids-Never    Physical Therapy/Home Exercise: yes       report:  Reviewed and consistent with medication use as prescribed.    Pain Procedures:   12/22/17 T1-2 IL BATSHEVA  1/3/18 T1-2 IL BATSHEVA- 75% relief  12/4/18 T1-2 IL BATSHEVA- 60% relief    Chiropractor -never  Acupuncture - never  TENS unit -never  Spinal decompression - previous ACDF C6-7 previous posterior laminectomy  Joint replacement -never    Imaging:   XRAY C SPINE 12/04/2017    Narrative     X-ray cervical spine AP lateral with flexion and extension demonstrates that the patient has undergone a prior C6-C7 anterior fusion with plate and screws and posterior decompression, and the fusion appears solid.  There is normal alignment, and flexion and extension views show no instability.  T1 is not optimally viewed on the lateral views.   Impression      Postoperative changes at C6-C7, no instability     Cervical MRI 9/2017:   C6-7 ACDF.   Previous posterior laminectomy defect.  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  Bilateral NFS at C3-4.  Left C4-5 NFS.  Disc bulge centrally at C5-6.    Past Medical History:   Diagnosis Date    Arthritis      Past Surgical History:   Procedure Laterality Date    CSF SHUNT      Injection, Steroid, Epidural CERVICAL T1-2 INTERLAMINAR BATSHEVA N/A 12/4/2018    [facility]    INJECTION-STEROID-EPIDURAL-CERVICAL N/A 1/3/2018    [facility]    INJECTION-STEROID-EPIDURAL-CERVICAL N/A 12/22/2017    [facility]    JOINT REPLACEMENT  2006    disc Sx , cervical     Social History     Socioeconomic History    Marital status: Single     Spouse name: Not on file    Number of children: Not on file    Years of education: Not on file    Highest education level: Not on file   Social Needs    Financial resource strain: Not on file    Food insecurity - worry: Not on file    Food insecurity - inability: Not on file    Transportation needs - medical: Not on file    Transportation needs - non-medical: Not on file   Occupational History    Not on file   Tobacco Use    Smoking status: Never Smoker   Substance and Sexual Activity    Alcohol use: No    Drug use: No    Sexual activity: Not on file   Other Topics Concern    Not on file   Social History Narrative    Not on file     No family history on file.    Review of patient's allergies indicates:  No Known Allergies    Current Outpatient Medications   Medication Sig    gabapentin (NEURONTIN) 300 MG capsule Take 2 capsules (600 mg total) by mouth 3 (three) times daily.     No current facility-administered medications for this visit.        REVIEW OF SYSTEMS:    GENERAL:  No weight loss, malaise or fevers.  HEENT:   No recent changes in vision or hearing  NECK:  Negative for lumps, no difficulty with swallowing.  RESPIRATORY:  Negative for cough, wheezing or  "shortness of breath, patient denies any recent URI.  CARDIOVASCULAR:  Negative for chest pain, leg swelling or palpitations.  GI:  Negative for abdominal discomfort, blood in stools or black stools or change in bowel habits.  MUSCULOSKELETAL:  See HPI.  SKIN:  Negative for lesions, rash, and itching.  PSYCH:  No mood disorder or recent psychosocial stressors.  Patients sleep is disturbed secondary to pain.  HEMATOLOGY/LYMPHOLOGY:  Negative for prolonged bleeding, bruising easily or swollen nodes.  Patient is not currently taking any anti-coagulants  ENDO: No history of diabetes or thyroid dysfunction  NEURO:   No history of headaches, syncope, paralysis, seizures or tremors.  All other reviewed and negative other than HPI.    OBJECTIVE:    /65   Pulse 75   Temp 98.4 °F (36.9 °C)   Ht 5' 9" (1.753 m)   Wt 93.9 kg (207 lb 0.2 oz)   BMI 30.57 kg/m²     PHYSICAL EXAMINATION:    GENERAL: Well appearing, in no acute distress, alert and oriented x3.  PSYCH:  Mood and affect appropriate.  SKIN: Skin color, texture, turgor normal, no rashes or lesions.  HEAD/FACE:  Normocephalic, atraumatic. Cranial nerves grossly intact.  NECK: No pain to palpation over the cervical paraspinous muscles.  Spurling positive on the left.  There is pain with neck extension.  Mild facet loading bilaterally.  CV: RRR with palpation of the radial artery.  PULM: No evidence of respiratory difficulty, symmetric chest rise.  EXTREMITIES: Peripheral joint ROM is full and pain free without obvious instability or laxity in all four extremities. No deformities, edema, or skin discoloration. Good capillary refill.  MUSCULOSKELETAL: Shoulder provocative maneuvers are negative.   Bilateral upper  extremity strength is normal and symmetric.  No atrophy or tone abnormalities are noted.  NEURO: Bilateral upper extremity coordination and muscle stretch reflexes are physiologic and symmetric.  Armaan's negative. No clonus.  Decreased sensation to " KIM.  GAIT: Antalgic- ambulates without assistance         ASSESSMENT: 48 y.o. year old male with neck pain, consistent with the following diagnoses:    Encounter Diagnoses   Name Primary?    Cervical radiculopathy Yes    Cervical spondylosis without myelopathy     Cervical post-laminectomy syndrome     Degeneration of cervical intervertebral disc        PLAN:     - Previous imaging was reviewed and discussed with the patient today.    - Will schedule for repeat T1-2 IL BATSHEVA.  He previously required 2 for significant benefit.    - Consider Burst SCS trial as previously discussed with Dr. Juarez.    - Continue Gabapentin 300 mg- 2 pills TID.    - The patient will continue a home exercise routine to help with pain and strengthening.      - Follow-up 3 weeks after procedure.      The above plan and management options were discussed at length with patient. Patient is in agreement with the above and verbalized understanding.     Oly Grissom  12/26/2018

## 2018-12-26 NOTE — PROGRESS NOTES
Chronic Pain - Established Visit    Referring Physician: No ref. provider found    Chief Complaint:   No chief complaint on file.       SUBJECTIVE: Disclaimer: This note has been generated using voice-recognition software. There may be typographical errors that have been missed during proof-reading    Interval History 12/26/2018:  The patient presents for injection follow up.  He is s/p repeat T1-2 IL BATSHEVA on 12/4/18 with about 60% relief.  He is reporting pain to the neck and into the arms still.  Last year, he required 2 ESIs for significant benefit.  He would like to schedule a repeat.  His pain today is 9/10.    Interval History 11/13/2018:  The patient returns for follow up of neck pain.  He previously was doing well after T1-T2 IL BATSHEVA x2 completed on 1/3/18.  He reports that he has been having increased pain for about one month.  The pain starts to the neck and radiates into both arms with associated numbness.  This is similar to previous pain.  He denies any new onset weakness.  He denies any b/b changes.  He has been taking Gabapentin 900 mg daily with limited benefit.  He has persistent numbness and burning.  His pain today is 10/10.    Interval History 4/26/2018:  The patient presents for follow up today.  He is still having benefit from previous ESIs.  He is happy with these results.  He continues to work and be active.  He increased Gabapentin to 900 mg at last OV which is helping.  His pain today is 0/10.    Interval History 1/25/2018:  The patient returns today for follow up of neck and arm pain.  He is s/p T1-T2 IL BATSHEVA x2 completed on 1/3/18 with 75% relief.  His pain is now mild.  He is still having numbness to his upper extremities and lower extremities.  He is currently taking Gabapentin 300 mg QHS.  His pain today is 0/10.    Initial encounter:    Heraclio CONROY Genaro presents to the clinic for the evaluation of neck pain. The pain started years ago following picking up a heavy object and symptoms  have been worsening.    Brief history:  Patient has a history of ACDF at C6-7 and a history of posterior laminectomy of the cervical spine at the same levels.  In addition to his bilateral upper extremity radicular symptoms he does have bilateral lower extremity radicular symptoms.  Patient has not been having any signs of myelopathy    Pain Description:    The pain is located in the neck area and radiates to bilateral upper extremities    At BEST  0/10     At WORST  10/10 on the WORST day.      On average pain is rated as 8/10.     Today the pain is rated as 8/10    The pain is described as aching, burning, numbing, shooting, throbbing, tight band and tingling      Symptoms interfere with daily activity and sleeping.     Exacerbating factors: Sitting, Laying, Bending, Lifting and Getting out of bed/chair.      Mitigating factors medications.     Patient denies .  Patient denies any suicidal or homicidal ideations    Pain Medications:  Current:  Gabapentin 600 mg TID    Tried in Past:  NSAIDs -Never  TCA -Never  SNRI -Never  Opioids-Never    Physical Therapy/Home Exercise: yes       report:  Reviewed and consistent with medication use as prescribed.    Pain Procedures:   12/22/17 T1-2 IL BATSHEVA  1/3/18 T1-2 IL BATSHEVA- 75% relief  12/4/18 T1-2 IL BATSHEVA- 60% relief    Chiropractor -never  Acupuncture - never  TENS unit -never  Spinal decompression - previous ACDF C6-7 previous posterior laminectomy  Joint replacement -never    Imaging:   XRAY C SPINE 12/04/2017    Narrative     X-ray cervical spine AP lateral with flexion and extension demonstrates that the patient has undergone a prior C6-C7 anterior fusion with plate and screws and posterior decompression, and the fusion appears solid.  There is normal alignment, and flexion and extension views show no instability.  T1 is not optimally viewed on the lateral views.   Impression      Postoperative changes at C6-C7, no instability     Cervical MRI 9/2017:   C6-7 ACDF.   Previous posterior laminectomy defect.  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  Bilateral NFS at C3-4.  Left C4-5 NFS.  Disc bulge centrally at C5-6.    Past Medical History:   Diagnosis Date    Arthritis      Past Surgical History:   Procedure Laterality Date    CSF SHUNT      Injection, Steroid, Epidural CERVICAL T1-2 INTERLAMINAR BATSHEVA N/A 12/4/2018    Performed by [facility]    INJECTION-STEROID-EPIDURAL-CERVICAL N/A 1/3/2018    [facility]    INJECTION-STEROID-EPIDURAL-CERVICAL N/A 12/22/2017    [facility]    JOINT REPLACEMENT  2006    disc Sx , cervical     Social History     Socioeconomic History    Marital status: Single     Spouse name: Not on file    Number of children: Not on file    Years of education: Not on file    Highest education level: Not on file   Social Needs    Financial resource strain: Not on file    Food insecurity - worry: Not on file    Food insecurity - inability: Not on file    Transportation needs - medical: Not on file    Transportation needs - non-medical: Not on file   Occupational History    Not on file   Tobacco Use    Smoking status: Never Smoker   Substance and Sexual Activity    Alcohol use: No    Drug use: No    Sexual activity: Not on file   Other Topics Concern    Not on file   Social History Narrative    Not on file     No family history on file.    Review of patient's allergies indicates:  No Known Allergies    Current Outpatient Medications   Medication Sig    gabapentin (NEURONTIN) 300 MG capsule Take 2 capsules (600 mg total) by mouth 3 (three) times daily.     No current facility-administered medications for this visit.        REVIEW OF SYSTEMS:    GENERAL:  No weight loss, malaise or fevers.  HEENT:   No recent changes in vision or hearing  NECK:  Negative for lumps, no difficulty with swallowing.  RESPIRATORY:  Negative for cough, wheezing or  "shortness of breath, patient denies any recent URI.  CARDIOVASCULAR:  Negative for chest pain, leg swelling or palpitations.  GI:  Negative for abdominal discomfort, blood in stools or black stools or change in bowel habits.  MUSCULOSKELETAL:  See HPI.  SKIN:  Negative for lesions, rash, and itching.  PSYCH:  No mood disorder or recent psychosocial stressors.  Patients sleep is disturbed secondary to pain.  HEMATOLOGY/LYMPHOLOGY:  Negative for prolonged bleeding, bruising easily or swollen nodes.  Patient is not currently taking any anti-coagulants  ENDO: No history of diabetes or thyroid dysfunction  NEURO:   No history of headaches, syncope, paralysis, seizures or tremors.  All other reviewed and negative other than HPI.    OBJECTIVE:    /65   Pulse 75   Temp 98.4 °F (36.9 °C)   Ht 5' 9" (1.753 m)   Wt 93.9 kg (207 lb 0.2 oz)   BMI 30.57 kg/m²     PHYSICAL EXAMINATION:    GENERAL: Well appearing, in no acute distress, alert and oriented x3.  PSYCH:  Mood and affect appropriate.  SKIN: Skin color, texture, turgor normal, no rashes or lesions.  HEAD/FACE:  Normocephalic, atraumatic. Cranial nerves grossly intact.  NECK: No pain to palpation over the cervical paraspinous muscles.  Spurling positive on the left.  There is pain with neck extension.  Mild facet loading bilaterally.  CV: RRR with palpation of the radial artery.  PULM: No evidence of respiratory difficulty, symmetric chest rise.  EXTREMITIES: Peripheral joint ROM is full and pain free without obvious instability or laxity in all four extremities. No deformities, edema, or skin discoloration. Good capillary refill.  MUSCULOSKELETAL: Shoulder provocative maneuvers are negative.   Bilateral upper  extremity strength is normal and symmetric.  No atrophy or tone abnormalities are noted.  NEURO: Bilateral upper extremity coordination and muscle stretch reflexes are physiologic and symmetric.  Armaan's negative. No clonus.  Decreased sensation to " KIM.  GAIT: Antalgic- ambulates without assistance         ASSESSMENT: 48 y.o. year old male with neck pain, consistent with the following diagnoses:    Encounter Diagnoses   Name Primary?    Cervical radiculopathy Yes    Cervical spondylosis without myelopathy     Cervical post-laminectomy syndrome     Degeneration of cervical intervertebral disc        PLAN:     - Previous imaging was reviewed and discussed with the patient today.    - Will schedule for repeat T1-2 IL BATSHEVA.  He previously required 2 for significant benefit.    - Consider Burst SCS trial as previously discussed with Dr. Juarez.    - Continue Gabapentin 300 mg- 2 pills TID.    - The patient will continue a home exercise routine to help with pain and strengthening.      - Follow-up 3 weeks after procedure.      The above plan and management options were discussed at length with patient. Patient is in agreement with the above and verbalized understanding.     Oly Grissom  12/26/2018

## 2018-12-26 NOTE — LETTER
December 26, 2018      [facility]  [address]  [facility] [address]  Phone: [phone]  Fax: [phone]       Patient: Heraclio Lam   YOB: 1970  Date of Visit: 12/26/2018    To Whom It May Concern:    Page Lam  was at [facility] on 12/26/2018. He may return to work on 12/27/18 with no restrictions. If you have any questions or concerns, or if I can be of further assistance, please do not hesitate to contact me.    Sincerely,        Nai Knight MA

## 2019-01-08 ENCOUNTER — HOSPITAL ENCOUNTER (OUTPATIENT)
Facility: OTHER | Age: 49
Discharge: HOME | End: 2019-01-08
Attending: ANESTHESIOLOGY | Admitting: ANESTHESIOLOGY
Payer: COMMERCIAL

## 2019-01-08 VITALS
BODY MASS INDEX: 30.36 KG/M2 | OXYGEN SATURATION: 99 % | SYSTOLIC BLOOD PRESSURE: 113 MMHG | RESPIRATION RATE: 18 BRPM | DIASTOLIC BLOOD PRESSURE: 73 MMHG | HEIGHT: 69 IN | TEMPERATURE: 98 F | WEIGHT: 205 LBS | HEART RATE: 70 BPM

## 2019-01-08 DIAGNOSIS — M54.12 CERVICAL RADICULOPATHY: Primary | ICD-10-CM

## 2019-01-08 PROCEDURE — 99152 PR MOD CONSCIOUS SEDATION, SAME PHYS, 5+ YRS, FIRST 15 MIN: ICD-10-PCS | Mod: ,,, | Performed by: ANESTHESIOLOGY

## 2019-01-08 PROCEDURE — 25000003 PHARM REV CODE 250: Performed by: ANESTHESIOLOGY

## 2019-01-08 PROCEDURE — 63600175 PHARM REV CODE 636 W HCPCS: Performed by: ANESTHESIOLOGY

## 2019-01-08 PROCEDURE — 62321 NJX INTERLAMINAR CRV/THRC: CPT | Performed by: ANESTHESIOLOGY

## 2019-01-08 PROCEDURE — 25500020 PHARM REV CODE 255: Performed by: ANESTHESIOLOGY

## 2019-01-08 PROCEDURE — 99152 MOD SED SAME PHYS/QHP 5/>YRS: CPT | Mod: ,,, | Performed by: ANESTHESIOLOGY

## 2019-01-08 PROCEDURE — 62321 NJX INTERLAMINAR CRV/THRC: CPT | Mod: ,,, | Performed by: ANESTHESIOLOGY

## 2019-01-08 PROCEDURE — 62321 PR INJ CERV/THORAC, W/GUIDANCE: ICD-10-PCS | Mod: ,,, | Performed by: ANESTHESIOLOGY

## 2019-01-08 RX ORDER — BUPIVACAINE HYDROCHLORIDE 2.5 MG/ML
INJECTION, SOLUTION EPIDURAL; INFILTRATION; INTRACAUDAL
Status: DISCONTINUED | OUTPATIENT
Start: 2019-01-08 | End: 2019-01-08 | Stop reason: HOSPADM

## 2019-01-08 RX ORDER — LIDOCAINE HYDROCHLORIDE 10 MG/ML
INJECTION INFILTRATION; PERINEURAL
Status: DISCONTINUED | OUTPATIENT
Start: 2019-01-08 | End: 2019-01-08 | Stop reason: HOSPADM

## 2019-01-08 RX ORDER — FENTANYL CITRATE 50 UG/ML
INJECTION, SOLUTION INTRAMUSCULAR; INTRAVENOUS
Status: DISCONTINUED | OUTPATIENT
Start: 2019-01-08 | End: 2019-01-08 | Stop reason: HOSPADM

## 2019-01-08 RX ORDER — SODIUM CHLORIDE 9 MG/ML
INJECTION, SOLUTION INTRAVENOUS CONTINUOUS
Status: DISCONTINUED | OUTPATIENT
Start: 2019-01-08 | End: 2019-01-08 | Stop reason: HOSPADM

## 2019-01-08 RX ORDER — MIDAZOLAM HYDROCHLORIDE 1 MG/ML
INJECTION INTRAMUSCULAR; INTRAVENOUS
Status: DISCONTINUED | OUTPATIENT
Start: 2019-01-08 | End: 2019-01-08 | Stop reason: HOSPADM

## 2019-01-08 RX ORDER — DEXAMETHASONE SODIUM PHOSPHATE 10 MG/ML
INJECTION INTRAMUSCULAR; INTRAVENOUS
Status: DISCONTINUED | OUTPATIENT
Start: 2019-01-08 | End: 2019-01-08 | Stop reason: HOSPADM

## 2019-01-08 NOTE — OP NOTE
Cervical Interlaminar Epidural Steroid Injection under Fluoroscopic Guidance.   Time-out taken to identify patient and procedure prior to starting the procedure.     Date of Procedure: 01/08/2019    PROCEDURE: Cervical Interlaminar epidural steroid injection T1/2 under fluoroscopic guidance.     Pre-Op diagnosis: Cervical radiculopathy [M54.12]    Post-Op diagnosis: Cervical radiculopathy [M54.12]    PHYSICIAN: CHRIS MATA     ASSISTANTS: None     MEDICATIONS INJECTED: Preservative-free Decadron 10 mg with 1mL of sterile 0.25%Bupivicaine and 3ml of preservative free normal saline.     LOCAL ANESTHETIC INJECTED: Xylocaine 1% 3mL    ESTIMATED BLOOD LOSS: none.     COMPLICATIONS: none.     TECHNIQUE: With the patient laying in a prone position, the area was prepped and draped in the usual sterile fashion using ChloraPrep and a fenestrated drape. Local anesthetic was given using a 27-gauge needle by raising a wheal and going down to the hub of the needle over the T1/2 interlaminar space.  The interlaminar space was then approached with a 3.5 inch 18-gauge Touhy needle was introduced under fluoroscopic guidance in the AP and Lateral view. Once the Ligamentum flavum was encountered loss of resistance to saline was used to enter the epidural space. With positive loss of resistance and negative CSF or Blood, 2mL contrast dye Omnipaque (300mg/ml) was injected to confirm placement and there was no vascular runoff. The medication was then injected slowly. The patient tolerated the procedure well.     Conscious sedation provided by M.D    The patient was monitored with continuous pulse oximetry, EKG, and intermittent blood pressure monitors.  The patient was hemodynamically stable throughout the entire process was responsive to voice, and breathing spontaneously.  Supplemental O2 was provided at 2L/min via nasal cannula.  Patient was comfortable for the duration of the procedure. (See nurse documentation and case log for  sedation time)    There was a total of 2mg IV Midazolam and 50 mcg fentanyl  was titrated for the procedure        The patient was monitored after the procedure.   They were given post-procedure and discharge instructions to follow at home.  The patient was discharged in a stable condition.

## 2019-01-08 NOTE — DISCHARGE INSTRUCTIONS
Thank you for allowing us to care for you today. You may receive a survey about the care we provided. Your feedback is valuable and helps us provide excellent care throughout the community. Adult Procedural Sedation Instructions    Recovery After Procedural Sedation (Adult)  You have been given medicine by vein to make you sleep during your surgery. This may have included both a pain medicine and sleeping medicine. Most of the effects have worn off. But you may still have some drowsiness for the next 6 to 8 hours.  Home care  Follow these guidelines when you get home:  · For the next 8 hours, you should be watched by a responsible adult. This person should make sure your condition is not getting worse.  · Don't drink any alcohol for the next 24 hours.  · Don't drive, operate dangerous machinery, or make important business or personal decisions during the next 24 hours.  Note: Your healthcare provider may tell you not to take any medicine by mouth for pain or sleep in the next 4 hours. These medicines may react with the medicines you were given in the hospital. This could cause a much stronger response than usual.  Follow-up care  Follow up with your healthcare provider if you are not alert and back to your usual level of activity within 12 hours.  When to seek medical advice  Call your healthcare provider right away if any of these occur:  · Drowsiness gets worse  · Weakness or dizziness gets worse  · Repeated vomiting  · You can't be awakened   Date Last Reviewed: 10/18/2016  © 1928-8624 [employer]. [address] [place] [address]. All rights reserved. This information is not intended as a substitute for professional medical care. Always follow your healthcare professional's instructions.     Home Care Instructions Pain Management:    1. DIET:   You may resume your normal diet today.   2. BATHING:   You may shower with luke warm water.  3. DRESSING:   You may remove your bandage today.    4. ACTIVITY LEVEL:   You may resume your normal activities 24 hrs after your procedure.  5. MEDICATIONS:   You may resume your normal medications today.   6. SPECIAL INSTRUCTIONS:   No heat to the injection site for 24 hrs including, bath or shower, heating pad, moist heat, or hot tubs.    Use ice pack to injection site for any pain or discomfort.  Apply ice packs for 20 minute intervals as needed.   If you have received any sedatives by mouth today you may not drive for 12 hours.    If you have received any sedation through your IV, you may not drive for 24 hrs.     PLEASE CALL YOUR DOCTOR IF:  1. Redness or swelling around the injection site.  2. Fever of 101 degrees  3. Drainage (pus) from the injection site.  4. For any continuous bleeding (some dried blood over the incision is normal.)    FOR EMERGENCIES:   If any unusual problems or difficulties occur during clinic hours, call [phone] or [phone].   Adult Procedural Sedation Instructions

## 2019-01-08 NOTE — DISCHARGE SUMMARY
Discharge Note  Short Stay      SUMMARY     Admit Date: 1/8/2019    Attending Physician: Elba Juarez      Discharge Physician: Elba Juarez      Discharge Date: 1/8/2019 9:15 AM    Procedure(s) (LRB):  Injection, Steroid, Epidural CERVICAL T1-2 IL BATSHEVA (N/A)    Final Diagnosis: Cervical radiculopathy [M54.12]    Disposition: Home or self care    Patient Instructions:   Current Discharge Medication List      CONTINUE these medications which have NOT CHANGED    Details   gabapentin (NEURONTIN) 300 MG capsule Take 2 capsules (600 mg total) by mouth 3 (three) times daily.  Qty: 180 capsule, Refills: 2    Associated Diagnoses: Cervical radiculitis; Cervical spondylosis without myelopathy; Degeneration of cervical intervertebral disc; Neck pain                 Discharge Diagnosis: Cervical radiculopathy [M54.12]  Condition on Discharge: Stable with no complications to procedure   Diet on Discharge: Same as before.  Activity: as per instruction sheet.  Discharge to: Home with a responsible adult.  Follow up: 2-4 weeks

## 2019-01-08 NOTE — INTERVAL H&P NOTE
"The patient has been examined and the H&P has been reviewed:    I concur with the findings and no changes have occurred since H&P was written.    Anesthesia/Surgery risks, benefits and alternative options discussed and understood by patient/family.    Patient presenting for  Procedure(s) (LRB):  Injection, Steroid, Epidural CERVICAL T1-2 IL BATSHEVA (N/A)    No health changes since previous encounter    PMHx, PSHx, Allergies, Medications reviewed in epic    ROS negative except pain complaints in HPI    OBJECTIVE:    /82 (BP Location: Right arm, Patient Position: Lying)   Pulse 60   Temp 98.1 °F (36.7 °C) (Oral)   Resp 18   Ht 5' 9" (1.753 m)   Wt 93 kg (205 lb)   SpO2 99%   BMI 30.27 kg/m²     PHYSICAL EXAMINATION:    GENERAL: Well appearing, in no acute distress, alert and oriented x3.  PSYCH:  Mood and affect appropriate.  SKIN: Skin color, texture, turgor normal, no rashes or lesions.  CV: RRR with palpation of the radial artery.  PULM: No evidence of respiratory difficulty, symmetric chest rise. Clear to auscultation.  NEURO: Cranial nerves grossly intact.    Plan:    Proceed with procedure as planned    Abel Ramirez  01/08/2019      There are no hospital problems to display for this patient.    "

## 2019-01-14 ENCOUNTER — PATIENT MESSAGE (OUTPATIENT)
Dept: PAIN MEDICINE | Facility: CLINIC | Age: 49
End: 2019-01-14

## 2019-01-14 DIAGNOSIS — M96.1 CERVICAL POST-LAMINECTOMY SYNDROME: ICD-10-CM

## 2019-01-14 DIAGNOSIS — M54.12 CERVICAL RADICULOPATHY: Primary | ICD-10-CM

## 2019-01-17 ENCOUNTER — TELEPHONE (OUTPATIENT)
Dept: PAIN MEDICINE | Facility: CLINIC | Age: 49
End: 2019-01-17

## 2019-01-17 NOTE — TELEPHONE ENCOUNTER
----- Message from Francia Murphy sent at 1/17/2019 11:27 AM CST -----  Contact: Ms. Lam    Name of Who is Calling:Ms. Lam    What is the request in detail: Patient mother would like a call back states patient has burning all over his body and losing taste buds Please contact to further discuss and advise     Can the clinic reply by [facility]: No    [facility]: [phone]

## 2019-01-18 ENCOUNTER — OFFICE VISIT (OUTPATIENT)
Dept: PAIN MEDICINE | Facility: CLINIC | Age: 49
End: 2019-01-18
Payer: COMMERCIAL

## 2019-01-18 VITALS
HEART RATE: 85 BPM | WEIGHT: 202.81 LBS | RESPIRATION RATE: 18 BRPM | DIASTOLIC BLOOD PRESSURE: 75 MMHG | HEIGHT: 69 IN | TEMPERATURE: 99 F | BODY MASS INDEX: 30.04 KG/M2 | SYSTOLIC BLOOD PRESSURE: 105 MMHG

## 2019-01-18 DIAGNOSIS — M96.1 CERVICAL POST-LAMINECTOMY SYNDROME: ICD-10-CM

## 2019-01-18 DIAGNOSIS — M54.12 CERVICAL RADICULOPATHY: Primary | ICD-10-CM

## 2019-01-18 DIAGNOSIS — G89.4 CHRONIC PAIN SYNDROME: ICD-10-CM

## 2019-01-18 DIAGNOSIS — M50.30 DEGENERATION OF CERVICAL INTERVERTEBRAL DISC: ICD-10-CM

## 2019-01-18 DIAGNOSIS — M47.812 CERVICAL SPONDYLOSIS WITHOUT MYELOPATHY: ICD-10-CM

## 2019-01-18 PROCEDURE — 3008F PR BODY MASS INDEX (BMI) DOCUMENTED: ICD-10-PCS | Mod: CPTII,S$GLB,, | Performed by: NURSE PRACTITIONER

## 2019-01-18 PROCEDURE — 99999 PR PBB SHADOW E&M-EST. PATIENT-LVL III: ICD-10-PCS | Mod: PBBFAC,,, | Performed by: NURSE PRACTITIONER

## 2019-01-18 PROCEDURE — 3008F BODY MASS INDEX DOCD: CPT | Mod: CPTII,S$GLB,, | Performed by: NURSE PRACTITIONER

## 2019-01-18 PROCEDURE — 99213 PR OFFICE/OUTPT VISIT, EST, LEVL III, 20-29 MIN: ICD-10-PCS | Mod: S$GLB,,, | Performed by: NURSE PRACTITIONER

## 2019-01-18 PROCEDURE — 99999 PR PBB SHADOW E&M-EST. PATIENT-LVL III: CPT | Mod: PBBFAC,,, | Performed by: NURSE PRACTITIONER

## 2019-01-18 PROCEDURE — 99213 OFFICE O/P EST LOW 20 MIN: CPT | Mod: S$GLB,,, | Performed by: NURSE PRACTITIONER

## 2019-01-18 NOTE — LETTER
January 18, 2019      [facility]  [address]  [facility] [address]  Phone: [phone]  Fax: [phone]       Patient: Heraclio Lam   YOB: 1970  Date of Visit: 01/18/2019    To Whom It May Concern:    Page Lam  was at [facility] on 01/18/2019.  Please excuse him for today.  He may return to work/school on 1/22/2019 with no restrictions. If you have any questions or concerns, or if I can be of further assistance, please do not hesitate to contact me.    Sincerely,    YAMILKA Hillman

## 2019-01-18 NOTE — PROGRESS NOTES
Chronic Pain - Established Visit    Referring Physician: No ref. provider found    Chief Complaint:   Chief Complaint   Patient presents with    Back Pain    Neck Pain        SUBJECTIVE: Disclaimer: This note has been generated using voice-recognition software. There may be typographical errors that have been missed during proof-reading    Interval History 1/18/2019:  The patient returns for follow up.  He had T1-2 IL BATSHEVA on 1/8/19.  He is reporting minimal benefit.  Since the procedure, he feels as though he has had a metallic taste.  He states that this has happened before with injections.  He denies any new weakness.  He denies bowel or bladder incontinence.  He is scheduled for a cervical MRI on 1/23/19.  His pain today is 10/10.      Interval History 12/26/2018:  The patient presents for injection follow up.  He is s/p repeat T1-2 IL BATSHEVA on 12/4/18 with about 60% relief.  He is reporting pain to the neck and into the arms still.  Last year, he required 2 ESIs for significant benefit.  He would like to schedule a repeat.  His pain today is 9/10.    Interval History 11/13/2018:  The patient returns for follow up of neck pain.  He previously was doing well after T1-T2 IL BATSHEVA x2 completed on 1/3/18.  He reports that he has been having increased pain for about one month.  The pain starts to the neck and radiates into both arms with associated numbness.  This is similar to previous pain.  He denies any new onset weakness.  He denies any b/b changes.  He has been taking Gabapentin 900 mg daily with limited benefit.  He has persistent numbness and burning.  His pain today is 10/10.    Interval History 4/26/2018:  The patient presents for follow up today.  He is still having benefit from previous ESIs.  He is happy with these results.  He continues to work and be active.  He increased Gabapentin to 900 mg at last OV which is helping.  His pain today is 0/10.    Interval History 1/25/2018:  The patient returns today  for follow up of neck and arm pain.  He is s/p T1-T2 IL BATSHEVA x2 completed on 1/3/18 with 75% relief.  His pain is now mild.  He is still having numbness to his upper extremities and lower extremities.  He is currently taking Gabapentin 300 mg QHS.  His pain today is 0/10.    Initial encounter:    Heraclio Lam presents to the clinic for the evaluation of neck pain. The pain started years ago following picking up a heavy object and symptoms have been worsening.    Brief history:  Patient has a history of ACDF at C6-7 and a history of posterior laminectomy of the cervical spine at the same levels.  In addition to his bilateral upper extremity radicular symptoms he does have bilateral lower extremity radicular symptoms.  Patient has not been having any signs of myelopathy    Pain Description:    The pain is located in the neck area and radiates to bilateral upper extremities    At BEST  0/10     At WORST  10/10 on the WORST day.      On average pain is rated as 8/10.     Today the pain is rated as 8/10    The pain is described as aching, burning, numbing, shooting, throbbing, tight band and tingling      Symptoms interfere with daily activity and sleeping.     Exacerbating factors: Sitting, Laying, Bending, Lifting and Getting out of bed/chair.      Mitigating factors medications.     Patient denies .  Patient denies any suicidal or homicidal ideations    Pain Medications:  Current:  Gabapentin 600 mg TID    Tried in Past:  NSAIDs -Never  TCA -Never  SNRI -Never  Opioids-Never    Physical Therapy/Home Exercise: yes       report:  Reviewed and consistent with medication use as prescribed.    Pain Procedures:   12/22/17 T1-2 IL BATSHEVA  1/3/18 T1-2 IL BATSHEVA- 75% relief  12/4/18 T1-2 IL BATSHEVA- 60% relief  1/8/19 T1-2 IL BATSHEVA- limited benefit    Chiropractor -never  Acupuncture - never  TENS unit -never  Spinal decompression - previous ACDF C6-7 previous posterior laminectomy  Joint replacement -never    Imaging:   XRAY C  SPINE 12/04/2017    Narrative     X-ray cervical spine AP lateral with flexion and extension demonstrates that the patient has undergone a prior C6-C7 anterior fusion with plate and screws and posterior decompression, and the fusion appears solid.  There is normal alignment, and flexion and extension views show no instability.  T1 is not optimally viewed on the lateral views.   Impression      Postoperative changes at C6-C7, no instability     Cervical MRI 9/2017:   C6-7 ACDF.  Previous posterior laminectomy defect.  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  Bilateral NFS at C3-4.  Left C4-5 NFS.  Disc bulge centrally at C5-6.    Past Medical History:   Diagnosis Date    Arthritis      Past Surgical History:   Procedure Laterality Date    CSF SHUNT      Injection, Steroid, Epidural CERVICAL T1-2 IL BATSHEVA N/A 1/8/2019    [facility]    Injection, Steroid, Epidural CERVICAL T1-2 INTERLAMINAR BATSHEVA N/A 12/4/2018    [facility]    INJECTION-STEROID-EPIDURAL-CERVICAL N/A 1/3/2018    [facility]    INJECTION-STEROID-EPIDURAL-CERVICAL N/A 12/22/2017    [facility]    JOINT REPLACEMENT  2006    disc Sx , cervical     Social History     Socioeconomic History    Marital status: Single     Spouse name: Not on file    Number of children: Not on file    Years of education: Not on file    Highest education level: Not on file   Social Needs    Financial resource strain: Not on file    Food insecurity - worry: Not on file    Food insecurity - inability: Not on file    Transportation needs - medical: Not on file    Transportation needs - non-medical: Not on file   Occupational History    Not on file   Tobacco Use    Smoking status: Never Smoker   Substance and Sexual Activity    Alcohol use: No    Drug use: No    Sexual activity: Not on file   Other Topics Concern  "   Not on file   Social History Narrative    Not on file     No family history on file.    Review of patient's allergies indicates:  No Known Allergies    Current Outpatient Medications   Medication Sig    gabapentin (NEURONTIN) 300 MG capsule Take 2 capsules (600 mg total) by mouth 3 (three) times daily.     No current facility-administered medications for this visit.        REVIEW OF SYSTEMS:    GENERAL:  No weight loss, malaise or fevers.  HEENT:   No recent changes in vision or hearing  NECK:  Negative for lumps, no difficulty with swallowing.  RESPIRATORY:  Negative for cough, wheezing or shortness of breath, patient denies any recent URI.  CARDIOVASCULAR:  Negative for chest pain, leg swelling or palpitations.  GI:  Negative for abdominal discomfort, blood in stools or black stools or change in bowel habits.  MUSCULOSKELETAL:  See HPI.  SKIN:  Negative for lesions, rash, and itching.  PSYCH:  No mood disorder or recent psychosocial stressors.  Patient's sleep is disturbed secondary to pain.  HEMATOLOGY/LYMPHOLOGY:  Negative for prolonged bleeding, bruising easily or swollen nodes.  Patient is not currently taking any anti-coagulants  ENDO: No history of diabetes or thyroid dysfunction  NEURO:   No history of headaches, syncope, paralysis, seizures or tremors.  All other reviewed and negative other than HPI.    OBJECTIVE:    /75   Pulse 85   Temp 98.7 °F (37.1 °C) (Oral)   Resp 18   Ht 5' 9" (1.753 m)   Wt 92 kg (202 lb 12.8 oz)   BMI 29.95 kg/m²     PHYSICAL EXAMINATION:    GENERAL: Well appearing, in no acute distress, alert and oriented x3.  PSYCH:  Mood and affect appropriate.  SKIN: Skin color, texture, turgor normal, no rashes or lesions.  HEAD/FACE:  Normocephalic, atraumatic. Cranial nerves grossly intact.  NECK: No pain to palpation over the cervical paraspinous muscles.  Spurling positive bilaterally.  There is pain with neck extension and flexion.  Mild facet loading bilaterally.  CV: " RRR with palpation of the radial artery.  PULM: No evidence of respiratory difficulty, symmetric chest rise.  EXTREMITIES: Peripheral joint ROM is full and pain free without obvious instability or laxity in all four extremities. No deformities, edema, or skin discoloration. Good capillary refill.  MUSCULOSKELETAL: Shoulder provocative maneuvers are negative.  Bilateral upper extremity strength is normal and symmetric.  No atrophy or tone abnormalities are noted.  NEURO: Bilateral upper extremity coordination and muscle stretch reflexes are physiologic and symmetric.  Armaan's negative. No clonus.  Decreased sensation to BUE.  GAIT: Antalgic- ambulates without assistance         ASSESSMENT: 48 y.o. year old male with neck pain, consistent with the following diagnoses:    Encounter Diagnoses   Name Primary?    Cervical radiculopathy Yes    Cervical post-laminectomy syndrome     Cervical spondylosis without myelopathy     Degeneration of cervical intervertebral disc     Chronic pain syndrome        PLAN:     - Previous imaging was reviewed and discussed with the patient today.    - He is s/p repeat T1-2 IL BATSHEVA with minimal benefit.  We discussed SCS trial which he is considering.  He would like wait until he has the MRI next week.    - Continue Gabapentin 300 mg- 2 pills TID.    - The patient will continue a home exercise routine to help with pain and strengthening.      - Follow-up PRN.    The above plan and management options were discussed at length with patient. Patient is in agreement with the above and verbalized understanding.     Oly Grissom  01/18/2019

## 2019-01-23 ENCOUNTER — HOSPITAL ENCOUNTER (OUTPATIENT)
Dept: RADIOLOGY | Facility: OTHER | Age: 49
Discharge: HOME | End: 2019-01-23
Attending: NURSE PRACTITIONER
Payer: COMMERCIAL

## 2019-01-23 DIAGNOSIS — M54.12 CERVICAL RADICULOPATHY: ICD-10-CM

## 2019-01-23 DIAGNOSIS — M96.1 CERVICAL POST-LAMINECTOMY SYNDROME: ICD-10-CM

## 2019-01-23 PROCEDURE — 72141 MRI NECK SPINE W/O DYE: CPT | Mod: 26,,, | Performed by: RADIOLOGY

## 2019-01-23 PROCEDURE — 72141 MRI NECK SPINE W/O DYE: CPT | Mod: TC

## 2019-01-23 PROCEDURE — 72141 MRI CERVICAL SPINE WITHOUT CONTRAST: ICD-10-PCS | Mod: 26,,, | Performed by: RADIOLOGY

## 2019-01-24 ENCOUNTER — TELEPHONE (OUTPATIENT)
Dept: PAIN MEDICINE | Facility: CLINIC | Age: 49
End: 2019-01-24

## 2019-01-24 ENCOUNTER — PATIENT MESSAGE (OUTPATIENT)
Dept: PAIN MEDICINE | Facility: CLINIC | Age: 49
End: 2019-01-24

## 2019-01-24 NOTE — TELEPHONE ENCOUNTER
----- Message from YAMILKA Hillman sent at 1/23/2019  3:16 PM CST -----  Regarding: psych consult  Please call patient and schedule for psych consult for [employer] SCS trial with Ann.

## 2019-01-25 ENCOUNTER — TELEPHONE (OUTPATIENT)
Dept: PAIN MEDICINE | Facility: CLINIC | Age: 49
End: 2019-01-25

## 2019-01-25 NOTE — TELEPHONE ENCOUNTER
----- Message from YAMILKA Hillman sent at 1/24/2019  3:44 PM CST -----  Regarding: RE: psych consult  Yes it is cervical  ----- Message -----  From: Phoebe Bahena RN  Sent: 1/24/2019   3:22 PM  To: YAMILKA Hillman  Subject: RE: psych consult                                Can you please confirm that this is a Cervical SCS Trial. Thank you.  ----- Message -----  From: YAMILKA Hillman  Sent: 1/23/2019   3:16 PM  To: Phoebe Bahena RN  Subject: psych consult                                    Please call patient and schedule for psych consult for [employer] SCS trial with Ann.

## 2019-01-28 ENCOUNTER — TELEPHONE (OUTPATIENT)
Dept: PSYCHIATRY | Facility: CLINIC | Age: 49
End: 2019-01-28

## 2019-02-05 ENCOUNTER — PATIENT MESSAGE (OUTPATIENT)
Dept: PAIN MEDICINE | Facility: CLINIC | Age: 49
End: 2019-02-05

## 2019-02-05 NOTE — TELEPHONE ENCOUNTER
Psych referral was sent on 01/24/19. Patient may contact Hi Gauthier with the Psychiatry Department at [phone] to schedule the appointment. I called her but she was not in the clinic at the moment.            Oly Grissom, YAMILKA   You 1 hour ago (1:05 PM)      Has a psych referral been put in for cervical SCS trial on him.  He says they have not contacted him.    Routing Comment

## 2019-02-11 ENCOUNTER — TELEPHONE (OUTPATIENT)
Dept: PAIN MEDICINE | Facility: CLINIC | Age: 49
End: 2019-02-11

## 2019-03-06 ENCOUNTER — OFFICE VISIT (OUTPATIENT)
Dept: PSYCHIATRY | Facility: CLINIC | Age: 49
End: 2019-03-06
Payer: COMMERCIAL

## 2019-03-06 DIAGNOSIS — F06.31 DEPRESSIVE DISORDER DUE TO ANOTHER MEDICAL CONDITION WITH DEPRESSIVE FEATURES: ICD-10-CM

## 2019-03-06 DIAGNOSIS — M96.1 CERVICAL POST-LAMINECTOMY SYNDROME: ICD-10-CM

## 2019-03-06 DIAGNOSIS — M54.12 CERVICAL RADICULOPATHY: ICD-10-CM

## 2019-03-06 DIAGNOSIS — G89.4 CHRONIC PAIN SYNDROME: ICD-10-CM

## 2019-03-06 DIAGNOSIS — Z01.818 PREOP EXAMINATION: Primary | ICD-10-CM

## 2019-03-06 PROCEDURE — 96139 PSYCL/NRPSYC TST TECH EA: CPT | Mod: S$GLB,,, | Performed by: PSYCHOLOGIST

## 2019-03-06 PROCEDURE — 96138 PR PSYCH/NEUROPSYCH TEST ADMIN/SCORING, BY TECH, 2+ TESTS, 1ST 30 MIN: ICD-10-PCS | Mod: S$GLB,,, | Performed by: PSYCHOLOGIST

## 2019-03-06 PROCEDURE — 99999 PR PBB SHADOW E&M-EST. PATIENT-LVL II: CPT | Mod: PBBFAC,,, | Performed by: PSYCHOLOGIST

## 2019-03-06 PROCEDURE — 90791 PR PSYCHIATRIC DIAGNOSTIC EVALUATION: ICD-10-PCS | Mod: S$GLB,,, | Performed by: PSYCHOLOGIST

## 2019-03-06 PROCEDURE — 96130 PSYCL TST EVAL PHYS/QHP 1ST: CPT | Mod: S$GLB,,, | Performed by: PSYCHOLOGIST

## 2019-03-06 PROCEDURE — 90791 PSYCH DIAGNOSTIC EVALUATION: CPT | Mod: S$GLB,,, | Performed by: PSYCHOLOGIST

## 2019-03-06 PROCEDURE — 96130 PR PSYCHOLOGIC TEST EVAL SVCS, 1ST HR: ICD-10-PCS | Mod: S$GLB,,, | Performed by: PSYCHOLOGIST

## 2019-03-06 PROCEDURE — 99999 PR PBB SHADOW E&M-EST. PATIENT-LVL II: ICD-10-PCS | Mod: PBBFAC,,, | Performed by: PSYCHOLOGIST

## 2019-03-06 PROCEDURE — 96131 PR PSYCHOLOGIC TEST EVAL SVCS, EA ADDTL HR: ICD-10-PCS | Mod: S$GLB,,, | Performed by: PSYCHOLOGIST

## 2019-03-06 PROCEDURE — 96138 PSYCL/NRPSYC TECH 1ST: CPT | Mod: S$GLB,,, | Performed by: PSYCHOLOGIST

## 2019-03-06 PROCEDURE — 96139 PR PSYCH/NEUROPSYCH TEST ADMIN/SCORING, BY TECH, 2+ TESTS, EA ADDTL 30 MIN: ICD-10-PCS | Mod: S$GLB,,, | Performed by: PSYCHOLOGIST

## 2019-03-06 PROCEDURE — 96131 PSYCL TST EVAL PHYS/QHP EA: CPT | Mod: S$GLB,,, | Performed by: PSYCHOLOGIST

## 2019-03-06 NOTE — PROGRESS NOTES
Psychiatry Initial Visit (PhD/LCSW)    Date:  03/06/2019    Site: [facility]     CPT Code: 57432    Referred by:  Elba Juarez M.D.    Chief complaint/reason for encounter:  Psychological Evaluation prior to surgical implantation of a spinal cord stimulator (SCS) to address chronic pain    Clinical status of patient:  Outpatient    Met with:  Patient    History of present illness:  Mr. Heraclio Lam is a 48-year-old single male referred for Psychological Evaluation prior to surgical implantation of a spinal cord stimulator (SCS) to address chronic pain.  His pain has been present since 1999 after an injury in which he was lifting something heavy at work.  He reported he has chronic pain in his sides of his necks, upper back and neck.  He has tried physical therapy, medications, injections, and surgery without receiving sufficient relief.  His activities are greatly limited.  He has difficulty getting out of bed in the morning, running, and lifting heavy objects.  He can continue doing some independent activities on his own like cooking, showering, and dressing himself.  Mr. Lam was able to walk to his appointment today without assistance.    Mr. Lam is now interested in a trial of the SCS.  He acknowledged that he did not review the educational materials, but he did understand that there were waves that reduces pain.  He was provided with information about the SCS and demonstrated familiarity with the procedures involved.  When asked about potential concerns regarding SCS, he indicated no concerns.  His expectations regarding SCS include hoping I dont feel this pain.  He is motivated to take the edge off - Id be more happier.  His mother will be available to help him during recovery after surgery.    Mr. Lam denied past psychiatric treatment and history.  He reports current depressive symptoms related to pain.  He acknowledged a history of enrollment in special education  and required additional prompts and queries throughout the evaluation.  He was pleasant and cooperative in interview and appeared to be in good spirits.     Medical history:  Cervical radiculopathy; Cervical post-laminectomy syndrome; Cervical spondylosis without myelopathy; Degeneration of cervical intervertebral disc; Chronic pain syndrome    Pain scales:   Current level of pain:  9/10 (neck)  Worst pain rating:  10/10  Best pain ratin/10    Psychiatric Symptoms:  Depression:  He reports that he occasionally experiences sadness or disinterest when he is thinking about pain.  He denied depressive symptoms that are unrelated to pain.  He estimates this occurs five days per week approximately 80% of the day.  He sometimes withdraws into his room and acknowledges less tastebuds because of this - I still eat but I dont have the taste for things.  He acknowledges feeling worthless on occasion like I cant do nothing and sometimes lacking motivation and energy.  He denied feelings of hopelessness and thoughts of death/dying.  He firmly denied suicidal ideation.  Based on his reports, his symptoms meet criteria for Depressive Disorder due to chronic pain.  It is likely his symptoms will remit with reductions in pain.  Nanci/Hypomania:  Denied.  He denied periods of elevated mood or abnormally increased energy or goal-directed activity.  Anxiety:  He reports occasional worries that take over.  He worries about not being able to do things for himself and being completely disabled.  He denied physiological hyperarousal associated with his anxiety.  He denied experiencing excessive, exaggerated anxiety that was unmanageable.  Based on his reports, his symptoms do not meet full criteria for Generalized Anxiety Disorder.  Thoughts:  Denied delusions, hallucinations.  Suicidal thoughts/behaviors:  Denied.  Self-injury:  Denied.  Substance abuse:  Denied abuse or dependence.  Sleep:  He has Gabapentin to help him  sleep because the pain is so bad.  He notes that the Gabapentin works well to help him sleep and he feels refreshed during the day.  Cognitive functioning:  Denied problems.    Current psychosocial stressors:  The pain.  Report of coping skills:  Lay down on the bed I put biofreeze on my neck just relax, sit down, watch television.  He enjoys doing yardwork.  Support system:  Mother, Father  Strengths and liabilities:  Strength: Patient accepts guidance/feedback, Strength: Patient is motivated for change., Strength: Patient has positive support network., Strength: Patient has reasonable judgment., Strength: Patient is stable., Liability: Patient has intellectual deficits.    Current and past substance use:  Alcohol: Denied current use.  He acknowledges a history of alcohol abuse in his 20s and was arrested for one DWI.  He stopped drinking alcohol in 1999 when I had a problem with diabetes.   Drugs: Denied current use; denied history of abuse or dependency.  Tobacco: None.   Caffeine: He drinks one to two cups of coffee each morning.    Current Psychiatric Treatment:  Medications:  Denied.  Psychotherapy:  Denied.    Psychiatric History:  Previous diagnosis:  Denied.  Previous hospitalizations:  Denied.  History of outpatient treatment:  Denied.  Previous suicide attempt:  Denied.  Family history of psychiatric illness:  None reported.    Trauma history:  Denied.    Social history (marriage, employment, etc.):   Mr. Lam was born and raised in [place], [place] by his biological mother and father along with sister (who is 18 years younger).  He described his childhood as good.  He denied childhood trauma, abuse, and neglect.  He did alright in school and attended up to the 12th grade.  He was in a special education throughout school and did not earn a diploma.  He was never held back.  He is currently working full-time for the city of [place] doing  work.  He is not on disability and  finances are stable.  He is currently single and has never been .  He has no children.  He currently lives with his parents.  [employer] is important to him and he identifies as [employer].    Legal history:  When he was in his 20s, he was arrested once for DWI.    Mental Status Exam:  General appearance:  appears stated age, neatly dressed, well groomed  Speech:  normal rate and tone  Level of cooperation:  cooperative  Thought processes:  logical, goal-directed  Mood:  mildly euthymic (alright)  Thought content:  no illusions, no visual hallucinations, no auditory hallucinations, no delusions, no active or passive homicidal thoughts, no active or passive suicidal ideation, no obsessions, no compulsions, no violence  Affect:  appropriate  Orientation:  oriented to person, place, and date (03/01/2019 - but he identified day of the week)  Memory:  Recent memory:  1 of 3 objects after brief delay.  (0/2 with prompts)  Remote memory - intact  Attention span and concentration:  spelled HOUSE forward only  Fund of general knowledge: fair to poor - 1 of 3 recent presidents (Alpesh Rico, Laith, I dont remember.)  Abstract reasoning:    Similarities: abstract.    Proverbs: concrete.  Judgment and insight: fair  Language:  intact    Diagnostic impressions:    ICD-10-CM ICD-9-CM   1. Preop examination Z01.818 V72.84   2. Cervical radiculopathy M54.12 723.4   3. Chronic pain syndrome G89.4 338.4   4. Cervical post-laminectomy syndrome M96.1 722.81   5. Depressive disorder due to another medical condition with depressive features F06.31 293.83       Plan and Recommendations:  Mr. Lam completed psychological testing.  The testing report of this psychological evaluation will follow in the Notes folder in the patients chart in the encounter titled Psychological Testing.    Although Mr. Lam has no significant psychiatric history, he reports current symptoms of depression due to pain limitations.  These  symptoms appear to be mild and would likely remit with reductions in pain.  He continues to perform at work and interact adequately with family.  There are no recommendations for psychological treatment at this time, and he is aware of resources available should his needs change in the future.  He was provided with information about chronic pain and how to use basic strategies to reduce pain.  This evaluation revealed NO contraindications to the spinal cord stimulator from a psychological perspective.        Length of time:   50 minutes

## 2019-03-06 NOTE — PSYCH TESTING
[facility] [address] [place]  [address]  [phone]    REPORT OF PSYCHOLOGICAL TESTING    NAME: Heraclio Lam  OC #: [id number]  : 1970    REFERRED BY:  Elba Juarez M.D.    EVALUATED BY:  Sivan Hall, Ph.D., Clinical Psychologist  LANE Haskins, Psychometrician    DATES OF EVALUATION: 2019, 2019    EVALUATION PROCEDURES AND TIMES:  Conducted by Psychologist (2 hours):  Integration of patient data, interpretation of standardized test results and clinical data, clinical decision-making, treatment planning and report, and interactive feedback to the patient  CPT Codes:  38743 - 1 hour; 04550 - 1 hour  Conducted by Technician (1 hour, 55 minutes):  Psychological test administration and scoring by technician, two or more tests, any method:  [place] Multiphasic Personality Inventory - 2 - Restructured Form (MMPI-2-RF); Pain and Impairment Relationship Scale (PAIRS); Gaitan Pain Catastrophizing Scale (PCS)  CPT Codes:  59296 - 30 minutes; 01299 - 30 minutes, 75517 - 30 minutes, 44525 - 30 minutes (3 additional units)    EVALUATION FINDINGS:  The diagnostic interview revealed that Mr. Heraclio Lam is a 48-year-old single male referred for Psychological Evaluation prior to surgical implantation of a spinal cord stimulator (SCS) to address chronic pain.  His pain has been present since  after an injury in which he was lifting something heavy at work.  He reported he has chronic pain in his sides of his necks, upper back and neck.  He has tried physical therapy, medications, injections, and surgery without receiving sufficient relief.  His activities are greatly limited.  He has difficulty getting out of bed in the morning, running, and lifting heavy objects.  He can continue doing some independent activities on his own like cooking, showering, and dressing himself.  Mr. Lam was able to walk to his appointment today without  assistance.    Mr. Lam is now interested in a trial of the SCS.  He acknowledged that he did not review the educational materials, but he did understand that there were waves that reduces pain.  He was provided with information about the SCS and demonstrated familiarity with the procedures involved.  When asked about potential concerns regarding SCS, he indicated no concerns.  His expectations regarding SCS include hoping I dont feel this pain.  He is motivated to take the edge off - Id be more happier.  His mother will be available to help him during recovery after surgery.    Mr. Lam denied past psychiatric treatment and history.  He reports current depressive symptoms related to pain.  He acknowledged a history of enrollment in special education and required additional prompts and queries throughout the evaluation.  He was pleasant and cooperative in interview and appeared to be in good spirits.     The medical record also revealed the following diagnoses:  Cervical radiculopathy; Cervical post-laminectomy syndrome; Cervical spondylosis without myelopathy; Degeneration of cervical intervertebral disc; Chronic pain syndrome.    TEST DATA:  Psychological Testing data revealed that he was fully cooperative and engaged in the assessment process.  He acknowledged a history of special education throughout school, which may have increased his testing time.  Effort on all tests was satisfactory to produce interpretable results.    The MMPI-2-RF provides an assessment of personality and psychopathology with specific evaluation of psychosocial risk factors associated with outcomes of spinal cord stimulation.  Mr. Lam produced an interpretable MMPI-2-RF profile. Of note, validity scale results suggest Mr. Lam tended to portray himself in an overly positive and well-adjusted presentation, which may indicate an underestimation of problems assessed by this test.  This profile should be  interpreted with these issues in mind.  Mr. Lam reports somatic complaints including preoccupation with poor health, head pain, and neurological symptoms.  Interpersonally, he reports disliking others and being around them.  He may have cynical beliefs and distrust of others, such as believing others look out only for their own interests.  Of note, Mr. Lam acknowledged physical health concerns.  He was queried about disliking others and noted that he prefers to be around people rather than alone.  He denied feeling distrustful of other people and noted, I think theyre alright.    The PAIRS and PCS reveal beliefs and attitudes that may impact outcomes of spinal cord stimulation.  The PAIRS indicated significant complications related to perceptions of impairment with a total score of 85 (a score over 75 is clinically significant).  The PCS indicated significant catastrophizing of pain with a total score of 48 which is in the 99th percentile (a score over 30 is in the 75th percentile and is clinically significant).    DIAGNOSTIC IMPRESSIONS:    ICD-10-CM ICD-9-CM   1. Preop examination Z01.818 V72.84   2. Cervical radiculopathy M54.12 723.4   3. Chronic pain syndrome G89.4 338.4   4. Cervical post-laminectomy syndrome M96.1 722.81   5. 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 Lam has a long history of severe pain and is pursuing the spinal cord stimulator to improve pain and quality of life.  Test results should be considered interpretable, and indicate that he reports physical health concerns and negative beliefs and attitudes related to pain.  Based on research and recommendations by Siva et al. (2017) and Siva and Lashaun (2013) regarding presurgical psychological screening for pain control procedures, his test results and reports are within the expected range to predict adequate outcomes and patient  satisfaction.  He was provided with information about chronic pain and how to use basic strategies to reduce pain.  In the clinical interview, Mr. Lam denied a history of psychiatric problems or adjustment issues but acknowledged current mild depressive symptoms due to pain.  It does not appear his symptoms require psychological intervention at this time.  This evaluation revealed NO contraindications to the spinal cord stimulator from a psychological perspective.        Interpretation and report and coding were completed on 03/06/2019.

## 2019-03-06 NOTE — Clinical Note
There are no overt psychological contraindications for the spinal cord stimulator procedure. Please do not hesitate to contact me with any questions or concerns. JR Joe

## 2019-03-08 ENCOUNTER — TELEPHONE (OUTPATIENT)
Dept: PAIN MEDICINE | Facility: CLINIC | Age: 49
End: 2019-03-08

## 2019-03-08 DIAGNOSIS — M54.12 CERVICAL RADICULOPATHY: ICD-10-CM

## 2019-03-08 DIAGNOSIS — G89.4 CHRONIC PAIN SYNDROME: Primary | ICD-10-CM

## 2019-03-08 DIAGNOSIS — M96.1 POSTLAMINECTOMY SYNDROME OF CERVICAL REGION: ICD-10-CM

## 2019-03-08 NOTE — TELEPHONE ENCOUNTER
Contacted and spoke to Jessy- patient's mother regarding scheduling SCS Trial.    Jessy stated she is the one who schedules and brings patients to all his appointments and procedures.    SCS Trial and post-op appointment scheduled, all dates/times and pre-procedure instructions given, all questions answered. Appointment letters mailed.    Jessy acknowledged information given and expressed understanding.

## 2019-03-11 ENCOUNTER — TELEPHONE (OUTPATIENT)
Dept: PAIN MEDICINE | Facility: CLINIC | Age: 49
End: 2019-03-11

## 2019-03-11 NOTE — TELEPHONE ENCOUNTER
----- Message from Jessica Lee sent at 3/11/2019  3:52 PM CDT -----  Per Chana at [facility] case was denied but a peer 2 peer can be done by calling [phone] using ref#[id number] with in 14 days if any questions please let me know thanks

## 2019-03-12 NOTE — TELEPHONE ENCOUNTER
Spoke to Ruthann Reyes, Pre-, she stated to reschedule the SCS trial (approximately 30-45 days out) instead of canceling it, because once it's canceled, the process for authorization has to start all over.    Contacted [facility]- patient's mother regarding rescheduling SCS Trial and post-op.    Staff gave [facility] information regarding denial and appeal of Cervical SCS Trial.     Cervical SCS Trial rescheduled to 04/24/19 and post-op r/s to 05/01/19, new appointment letters mailed.            Patient Calls      YAMILKA Hillman   You; Noreen Fish MA; Elba Juarez MD 2 hours ago (1:15 PM)      Cervical SCS trial was denied.  Apparently [facility] only covers for lumbar with postlaminectomy, not cervical.  I will write an appeal and send to [employer] once we receive the denial letter.  Please cancel trial and let patient know we plan to appeal.    Routing Comment        YAMILKA Hillman MA 3 hours ago (11:49 AM)      It has to be scheduled.  I set it up for this afternoon.    Routing Comment

## 2019-04-05 ENCOUNTER — PATIENT MESSAGE (OUTPATIENT)
Dept: PAIN MEDICINE | Facility: CLINIC | Age: 49
End: 2019-04-05

## 2019-04-05 NOTE — TELEPHONE ENCOUNTER
ILENE- Please see below.     [4/5/2019 12:15 PM]  Jessica Lee:    S/w Maurizio at [facility] states appeal was just scanned in today and the projected due date is 4/13/2019 ref#[id number]        [4/5/2019 12:03 PM]    Good morning, patient Heraclio Lam MRN [id number] would like an update on his approval. [employer] did the appeal several days ago. Can you please inform status, thank you.

## 2019-04-08 NOTE — TELEPHONE ENCOUNTER
Contacted patient's mother regarding message.    Mrs. Lam stated it was regarding status of approval and that [employer] had sent her a message already.

## 2019-04-24 ENCOUNTER — TELEPHONE (OUTPATIENT)
Dept: PAIN MEDICINE | Facility: CLINIC | Age: 49
End: 2019-04-24

## 2019-04-24 NOTE — TELEPHONE ENCOUNTER
Contacted and spoke to Alisha- patient's sister, she stated Mr. Heraclio Lam has a learning disability and all information has to go to his mother, however, [place] and she could take a message.    Staff informed Alisha that we were notified by pre-service that Mr. Lam's appeal for the SCS Trial scheduled on 05/01/19 with Dr. Juarez had been denied because he did not meet the 3 criteria and the trial will have to be canceled.     Alisha stated she will call her mother to give her this information, she expressed understanding.

## 2019-04-25 ENCOUNTER — TELEPHONE (OUTPATIENT)
Dept: PAIN MEDICINE | Facility: CLINIC | Age: 49
End: 2019-04-25

## 2019-04-25 NOTE — TELEPHONE ENCOUNTER
Attempted to contact patient's mother Mrs. Lam, no answer, left voice message and requested a return call.

## 2019-04-28 ENCOUNTER — PATIENT MESSAGE (OUTPATIENT)
Dept: PAIN MEDICINE | Facility: CLINIC | Age: 49
End: 2019-04-28

## 2019-04-29 NOTE — TELEPHONE ENCOUNTER
Attempted to contact patient regarding message, no answer, left voice message and requested a return call.

## 2019-05-01 DIAGNOSIS — M54.12 CERVICAL RADICULITIS: ICD-10-CM

## 2019-05-01 DIAGNOSIS — M54.2 NECK PAIN: ICD-10-CM

## 2019-05-01 DIAGNOSIS — M50.30 DEGENERATION OF CERVICAL INTERVERTEBRAL DISC: ICD-10-CM

## 2019-05-01 DIAGNOSIS — M47.812 CERVICAL SPONDYLOSIS WITHOUT MYELOPATHY: ICD-10-CM

## 2019-05-01 RX ORDER — GABAPENTIN 300 MG/1
CAPSULE ORAL
Qty: 90 CAPSULE | Refills: 5 | Status: SHIPPED | OUTPATIENT
Start: 2019-05-01 | End: 2019-08-14 | Stop reason: SDUPTHER

## 2019-05-03 ENCOUNTER — PATIENT MESSAGE (OUTPATIENT)
Dept: PAIN MEDICINE | Facility: CLINIC | Age: 49
End: 2019-05-03

## 2019-05-14 ENCOUNTER — PATIENT MESSAGE (OUTPATIENT)
Dept: PAIN MEDICINE | Facility: CLINIC | Age: 49
End: 2019-05-14

## 2019-06-06 ENCOUNTER — TELEPHONE (OUTPATIENT)
Dept: PAIN MEDICINE | Facility: CLINIC | Age: 49
End: 2019-06-06

## 2019-06-06 NOTE — TELEPHONE ENCOUNTER
----- Message from Adalberto Lee sent at 6/6/2019  3:43 PM CDT -----  Contact: Clint ROBERT ([facility])  Name of Who is Calling: Clint ROBERT ([facility])      What is the request in detail: Would like to speak with staff in regards to the appeal being denied for stimulator. But can be approved if the patient has leg pain at rest due to low blood supply, fail back surgery syndrome, and diagnosis of complex regional pain syndrome. If the patient shows any of these will need clinical notes to go along with the diagnosis. Fax# [phone]      Can the clinic reply by [facility]: no      What Number to Call Back if not in [facility]: [phone] ref#[id number]

## 2019-06-10 NOTE — TELEPHONE ENCOUNTER
Attemptedt to contact Payton ROBERT with [facility] to inform that patient's c/o neck pain, and diagnoses she mentioned were for back pain, no answer, left detailed voice message.      Patient Calls     YAMILKA Hillman  You 3 days ago      Did you see this message.  It is for his neck anyway.    Routing comment       ISAAC Hay FNP 4 days ago      Please review and advise    Routing comment

## 2019-08-13 ENCOUNTER — TELEPHONE (OUTPATIENT)
Dept: PAIN MEDICINE | Facility: CLINIC | Age: 49
End: 2019-08-13

## 2019-08-13 ENCOUNTER — PATIENT MESSAGE (OUTPATIENT)
Dept: PAIN MEDICINE | Facility: CLINIC | Age: 49
End: 2019-08-13

## 2019-08-13 ENCOUNTER — OFFICE VISIT (OUTPATIENT)
Dept: URGENT CARE | Facility: CLINIC | Age: 49
End: 2019-08-13
Payer: COMMERCIAL

## 2019-08-13 VITALS
SYSTOLIC BLOOD PRESSURE: 133 MMHG | HEIGHT: 69 IN | TEMPERATURE: 99 F | OXYGEN SATURATION: 98 % | BODY MASS INDEX: 29.92 KG/M2 | DIASTOLIC BLOOD PRESSURE: 84 MMHG | HEART RATE: 68 BPM | WEIGHT: 202 LBS

## 2019-08-13 DIAGNOSIS — M54.2 CERVICAL PAIN (NECK): Primary | ICD-10-CM

## 2019-08-13 PROCEDURE — 3008F BODY MASS INDEX DOCD: CPT | Mod: CPTII,S$GLB,, | Performed by: FAMILY MEDICINE

## 2019-08-13 PROCEDURE — 99203 OFFICE O/P NEW LOW 30 MIN: CPT | Mod: S$GLB,,, | Performed by: FAMILY MEDICINE

## 2019-08-13 PROCEDURE — 3008F PR BODY MASS INDEX (BMI) DOCUMENTED: ICD-10-PCS | Mod: CPTII,S$GLB,, | Performed by: FAMILY MEDICINE

## 2019-08-13 PROCEDURE — 99203 PR OFFICE/OUTPT VISIT, NEW, LEVL III, 30-44 MIN: ICD-10-PCS | Mod: S$GLB,,, | Performed by: FAMILY MEDICINE

## 2019-08-13 RX ORDER — CYCLOBENZAPRINE HCL 5 MG
5 TABLET ORAL 3 TIMES DAILY PRN
Qty: 30 TABLET | Refills: 1 | Status: SHIPPED | OUTPATIENT
Start: 2019-08-13 | End: 2019-08-23

## 2019-08-13 NOTE — LETTER
August 13, 2019      [facility]  [address] [address]  Phone: [phone]  Fax: [phone]       Patient: Heraclio Lam   YOB: 1970  Date of Visit: 08/13/2019    To Whom It May Concern:    Page Lam  was at [facility] on 08/13/2019. He may return to work/school on Thurs Aug 15 with no restrictions. If you have any questions or concerns, or if I can be of further assistance, please do not hesitate to contact me.    Sincerely,          Grace Baker MD

## 2019-08-13 NOTE — TELEPHONE ENCOUNTER
"Staff returned the patient's mothers call regarding the patient appointment request she made.     Patient mother (Jessy) stated," I scheduled an appointment this morning but I have to schedule it. I am on the phone right now and driving so I would have to call you all back."    Staff verbalized understanding.       "

## 2019-08-13 NOTE — PATIENT INSTRUCTIONS
Neck Problems: Relieving Your Symptoms  The first goal of treatment is to relieve your symptoms. Your healthcare provider may recommend self-care treatments. These include resting, applying ice and heat, taking medicine, and doing exercises. Your healthcare provider may also recommend that you see a physical therapist who can teach you ways to care for and strengthen your neck.     Heat relaxes sore muscles and helps relieve spasms.   Self-care treatments  Pain can end quickly or last awhile. Either way, youll want relief as soon as possible. Your healthcare provider can tell you which treatments to do at home to help relieve your pain.  · Lying down for a short time takes pressure from the head off the neck.  · Ice and heat can help reduce pain. To bring down swelling, rest an ice pack wrapped in a thin towel on your neck for 10 to 15 minutes. To relax sore muscles, apply a warm, wet towel to the area. Or you can take a warm bath or shower.  · Over-the-counter medicines, such as ibuprofen, naproxen, and aspirin, can help reduce pain and swelling. Acetaminophen can help relieve pain. Use these only as directed.  · Exercises can relax muscles and ease stiffness. To prepare, drape a warm, wet towel around your neck and shoulders for 5 minutes. Remove the towel. Then do any exercises recommended to you by your healthcare provider.  Physical therapy  If self-care treatments arent helping relieve neck pain, your healthcare provider may suggest physical therapy. Physical therapy is done by a specialist trained to treat injuries. Your physical therapist (PT) will teach you how to strengthen muscles, improve the spines alignment, and help you move properly. Treatment methods used in physical therapy may include:  · Heat. A special heating pad called a neck pack may be applied to your neck.  · Exercises. Your PT will teach you exercises to help strengthen your neck and improve its range of motion.  · Joint  mobilization. The PT gently moves your vertebrae to help restore motion in your neck joints and reduce neck pain.  · Soft tissue mobilization. The PT massages and stretches the muscles in your neck and shoulders.  · Electrical stimulation. Electrical impulses are sent into your neck. This helps reduce soreness and inflammation.  · Education in body mechanics. The PT shows you ways to position and move your body that protect the neck.  Other treatments  If physical therapy doesnt relieve your neck pain, your healthcare provider may suggest other treatments. For example, medicines or injections can help relieve pain and swelling. In some cases, surgery may be needed to treat neck problems.  Date Last Reviewed: 8/23/2015  © 9980-1259 [employer]. [address] [place] [address], [place] [address]. All rights reserved. This information is not intended as a substitute for professional medical care. Always follow your healthcare professional's instructions.      TRY IBUPROFEN 600 MG EVERY 6 HR AS NEEDED.    BE AWARE THAT THE MUSCLE RELAXANT CAN MAKE YOU SLEEPY, SO DO NOT TAKE BEFORE DRIVING OR OPERATING HEAVY MACHINERY.    MAKE SURE TO FOLLOW UP WITH YOUR PAIN MANAGEMENT PROVIDERS AND PRIMARY CARE FOR A LONG-TERM PLAN TO CONTINUE TO ADDRESS YOUR CONCERNS.    Make sure that you follow up with your primary care doctor in the next 2-5 days if needed .  Return to the Urgent Care if signs or symptoms change and certainly if you have worsening symptoms go to the nearest emergency department for further evaluation.

## 2019-08-13 NOTE — TELEPHONE ENCOUNTER
Spoke to Mr. Lam mother Paty Lam, she was informed there are no available appointments this week, however we can add him to the waiting list in case there is a cancellation, they are active on [facility] so as soon as an appointment becomes available they will be notified. The sooner the response after the notification, the more likely for them to be scheduled.     Ms. Lam verbalized understanding and accepted being placed on waiting list.

## 2019-08-13 NOTE — TELEPHONE ENCOUNTER
----- Message from Jessica Gregory sent at 8/13/2019  6:40 AM CDT -----  See below:    Appointment Request From: Heraclio Lam    With Provider: YAMILKA Trujillo [[facility] [address]]    Preferred Date Range: 8/13/2019 - 8/16/2019    Preferred Times: Tuesday Morning, Wednesday Morning, Thursday Morning, Friday Morning    Reason for visit: check up and pain    Comments:  Health

## 2019-08-13 NOTE — PROGRESS NOTES
"Subjective:       Patient ID: Heraclio Lam is a 49 y.o. male.    Vitals:  height is 5' 9" (1.753 m) and weight is 91.6 kg (202 lb). His oral temperature is 99.2 °F (37.3 °C). His blood pressure is 133/84 and his pulse is 68. His oxygen saturation is 98%.     Chief Complaint: Neck Pain    49-year-old male with hx of c-spine surgery.  Also with developmental disability per parents.  He presents with his parents.  Their concern is recent neck pain, which is not new, and difficulty with sleep, and with work.  They are hoping I can write him a note regarding extended disability related to work.  He works for the city as a .  No recent trauma or overuse.  Pt last had neck injections in Jan 2019 but states that they did not work.  Pt states he is unable to sleep due to the neck pain. This has been long-time issue.  He is established with pain management and primary care.  Currently on Neurontin 300 mg 3 times daily.  They do not recall that he has ever tried ibuprofen.    Neck Pain    This is a chronic problem. The current episode started more than 1 month ago. The problem occurs constantly. The problem has been waxing and waning. The pain is associated with an unknown factor. The quality of the pain is described as aching, burning and shooting. The pain is moderate. The pain is same all the time. Pertinent negatives include no numbness. He has tried ice, heat and oral narcotics for the symptoms. The treatment provided no relief.       Constitution: Negative for fatigue.   Neck: Positive for neck pain.   Gastrointestinal: Negative for abdominal pain and bowel incontinence.   Genitourinary: Negative for dysuria, urgency, bladder incontinence and hematuria.   Musculoskeletal: Positive for muscle ache and history of spine disorder. Negative for back pain and muscle cramps.   Skin: Negative for rash.   Neurological: Negative for coordination disturbances, numbness and tingling.       Objective:      Physical " Exam   Constitutional: He is oriented to person, place, and time. He appears well-developed and well-nourished. He is cooperative.  Non-toxic appearance. He does not appear ill. No distress.   Very pleasant.  No distress.  Not ill-appearing.   HENT:   Head: Normocephalic and atraumatic.   Right Ear: Hearing, tympanic membrane, external ear and ear canal normal.   Left Ear: Hearing, tympanic membrane, external ear and ear canal normal.   Nose: Nose normal. No mucosal edema, rhinorrhea or nasal deformity. No epistaxis. Right sinus exhibits no maxillary sinus tenderness and no frontal sinus tenderness. Left sinus exhibits no maxillary sinus tenderness and no frontal sinus tenderness.   Mouth/Throat: Uvula is midline, oropharynx is clear and moist and mucous membranes are normal. No trismus in the jaw. Normal dentition. No uvula swelling. No oropharyngeal exudate or posterior oropharyngeal erythema.   Eyes: Pupils are equal, round, and reactive to light. Conjunctivae, EOM and lids are normal. Right eye exhibits no discharge. Left eye exhibits no discharge. No scleral icterus.   Sclera clear bilat   Neck: Trachea normal, normal range of motion, full passive range of motion without pain and phonation normal. Neck supple.   Nontender over cervical spine.  Bilateral paracervical muscular tenderness is noted, radiating to traps bilaterally.  Motor and sensory exams are within normal limits.     Cardiovascular: Normal rate, regular rhythm, normal heart sounds, intact distal pulses and normal pulses.   Pulmonary/Chest: Effort normal and breath sounds normal. No stridor. No respiratory distress. He has no wheezes. He has no rales.   Abdominal: Normal appearance. He exhibits no pulsatile midline mass.   Musculoskeletal: Normal range of motion. He exhibits no edema or deformity.   Neurological: He is alert and oriented to person, place, and time. He displays normal reflexes. No sensory deficit. He exhibits normal muscle tone.  Coordination normal.   Skin: Skin is warm, dry and intact. He is not diaphoretic. No pallor.   Psychiatric: He has a normal mood and affect. His speech is normal and behavior is normal. Judgment and thought content normal. Cognition and memory are normal.   Nursing note and vitals reviewed.      Assessment:       1. Cervical pain (neck)        Plan:         Cervical pain (neck)  -     cyclobenzaprine (FLEXERIL) 5 MG tablet; Take 1 tablet (5 mg total) by mouth 3 (three) times daily as needed for Muscle spasms.  Dispense: 30 tablet; Refill: 1    TRY IBUPROFEN 600 MG EVERY 6 HR AS NEEDED.    BE AWARE THAT THE MUSCLE RELAXANT CAN MAKE YOU SLEEPY, SO DO NOT TAKE BEFORE DRIVING OR OPERATING HEAVY MACHINERY.    MAKE SURE TO FOLLOW UP WITH YOUR PAIN MANAGEMENT PROVIDERS AND PRIMARY CARE FOR A LONG-TERM PLAN TO CONTINUE TO ADDRESS YOUR CONCERNS.    Make sure that you follow up with your primary care doctor in the next 2-5 days if needed .  Return to the Urgent Care if signs or symptoms change and certainly if you have worsening symptoms go to the nearest emergency department for further evaluation.

## 2019-08-13 NOTE — TELEPHONE ENCOUNTER
----- Message from Melinda Villa sent at 8/13/2019  2:08 PM CDT -----  Contact: Jessy R Genaro (Mother) / # [phone]  Name of Who is Calling: Jessy Lam (Mother)       What is the request in detail:   Patient's mother called requesting that her son is seen in this clinic today or tomorrow. I did attempt to schedule per patient's mother's request but I wasn't successful. Please give a call back at your earliest convenience.   THANKS!          Can the clinic reply by [facility]: no    Number to Call Back:  Jessy CHE Genaro (Mother) / # [phone]

## 2019-08-14 ENCOUNTER — PATIENT MESSAGE (OUTPATIENT)
Dept: PAIN MEDICINE | Facility: CLINIC | Age: 49
End: 2019-08-14

## 2019-08-14 ENCOUNTER — OFFICE VISIT (OUTPATIENT)
Dept: PAIN MEDICINE | Facility: CLINIC | Age: 49
End: 2019-08-14
Payer: COMMERCIAL

## 2019-08-14 VITALS
BODY MASS INDEX: 29.83 KG/M2 | DIASTOLIC BLOOD PRESSURE: 90 MMHG | HEART RATE: 81 BPM | RESPIRATION RATE: 18 BRPM | SYSTOLIC BLOOD PRESSURE: 136 MMHG | TEMPERATURE: 99 F | HEIGHT: 69 IN

## 2019-08-14 DIAGNOSIS — M54.12 CERVICAL RADICULOPATHY: ICD-10-CM

## 2019-08-14 DIAGNOSIS — M50.30 DEGENERATION OF CERVICAL INTERVERTEBRAL DISC: ICD-10-CM

## 2019-08-14 DIAGNOSIS — M54.12 CERVICAL RADICULITIS: ICD-10-CM

## 2019-08-14 DIAGNOSIS — M54.2 NECK PAIN: ICD-10-CM

## 2019-08-14 DIAGNOSIS — G89.4 CHRONIC PAIN SYNDROME: Primary | ICD-10-CM

## 2019-08-14 DIAGNOSIS — M47.812 CERVICAL SPONDYLOSIS WITHOUT MYELOPATHY: ICD-10-CM

## 2019-08-14 DIAGNOSIS — M96.1 POSTLAMINECTOMY SYNDROME OF CERVICAL REGION: ICD-10-CM

## 2019-08-14 PROCEDURE — 99213 OFFICE O/P EST LOW 20 MIN: CPT | Mod: S$GLB,,, | Performed by: NURSE PRACTITIONER

## 2019-08-14 PROCEDURE — 99999 PR PBB SHADOW E&M-EST. PATIENT-LVL III: CPT | Mod: PBBFAC,,, | Performed by: NURSE PRACTITIONER

## 2019-08-14 PROCEDURE — 99213 PR OFFICE/OUTPT VISIT, EST, LEVL III, 20-29 MIN: ICD-10-PCS | Mod: S$GLB,,, | Performed by: NURSE PRACTITIONER

## 2019-08-14 PROCEDURE — 3008F PR BODY MASS INDEX (BMI) DOCUMENTED: ICD-10-PCS | Mod: CPTII,S$GLB,, | Performed by: NURSE PRACTITIONER

## 2019-08-14 PROCEDURE — 3008F BODY MASS INDEX DOCD: CPT | Mod: CPTII,S$GLB,, | Performed by: NURSE PRACTITIONER

## 2019-08-14 PROCEDURE — 99999 PR PBB SHADOW E&M-EST. PATIENT-LVL III: ICD-10-PCS | Mod: PBBFAC,,, | Performed by: NURSE PRACTITIONER

## 2019-08-14 RX ORDER — GABAPENTIN 300 MG/1
300 CAPSULE ORAL
Qty: 150 CAPSULE | Refills: 2 | Status: SHIPPED | OUTPATIENT
Start: 2019-08-14 | End: 2019-11-22 | Stop reason: SDUPTHER

## 2019-08-14 NOTE — LETTER
August 14, 2019      [facility] [place] [address]  [address] [place] [address]  [facility] [address]  Phone: [phone]  Fax: [phone]       Patient: Heraclio Lam   YOB: 1970  Date of Visit: 08/14/2019    To Whom It May Concern:    Page Lam  was at [facility] on 08/14/2019. He may return to work/school on 08.15.19 with no restrictions. If you have any questions or concerns, or if I can be of further assistance, please do not hesitate to contact me.    Sincerely,    Kelby Tineo MA

## 2019-08-14 NOTE — PROGRESS NOTES
Chronic Pain - Established Visit    Referring Physician: No ref. provider found    Chief Complaint:   Chief Complaint   Patient presents with    Follow-up        SUBJECTIVE: Disclaimer: This note has been generated using voice-recognition software. There may be typographical errors that have been missed during proof-reading    Interval History 8/14/2019:  The patient is here for follow up of neck and arm pain.  We have not seen the patient since January.  At that time, he was scheduled for cervical SCS trial.  Unfortunately, this was denied by his insurance.  He previously had benefit with ESIs but they stopped being very effective.  He has completed PT in th past with limited benefit.  He saw neurosurgery in 2017 and surgery was not recommended.  He is taking Gabapentin 300 mg- 4 pills per day (1200 mg daily).  He says that his pain has been effecting his ability and function at work.  He is asking about being on leave or disability.  His pain today is 10/10.  The patient denies any bowel or bladder incontinence or signs of saddle paresthesia.  The patient denies any major medical changes since last office visit.    Interval History 1/18/2019:  The patient returns for follow up.  He had T1-2 IL BATSHEVA on 1/8/19.  He is reporting minimal benefit.  Since the procedure, he feels as though he has had a metallic taste.  He states that this has happened before with injections.  He denies any new weakness.  He denies bowel or bladder incontinence.  He is scheduled for a cervical MRI on 1/23/19.  His pain today is 10/10.      Interval History 12/26/2018:  The patient presents for injection follow up.  He is s/p repeat T1-2 IL BATSHEVA on 12/4/18 with about 60% relief.  He is reporting pain to the neck and into the arms still.  Last year, he required 2 ESIs for significant benefit.  He would like to schedule a repeat.  His pain today is 9/10.    Interval History 11/13/2018:  The patient returns for follow up of neck pain.  He  previously was doing well after T1-T2 IL BATSHEVA x2 completed on 1/3/18.  He reports that he has been having increased pain for about one month.  The pain starts to the neck and radiates into both arms with associated numbness.  This is similar to previous pain.  He denies any new onset weakness.  He denies any b/b changes.  He has been taking Gabapentin 900 mg daily with limited benefit.  He has persistent numbness and burning.  His pain today is 10/10.    Interval History 4/26/2018:  The patient presents for follow up today.  He is still having benefit from previous ESIs.  He is happy with these results.  He continues to work and be active.  He increased Gabapentin to 900 mg at last OV which is helping.  His pain today is 0/10.    Interval History 1/25/2018:  The patient returns today for follow up of neck and arm pain.  He is s/p T1-T2 IL BATSHEVA x2 completed on 1/3/18 with 75% relief.  His pain is now mild.  He is still having numbness to his upper extremities and lower extremities.  He is currently taking Gabapentin 300 mg QHS.  His pain today is 0/10.    Initial encounter:    Heraclio Lam presents to the clinic for the evaluation of neck pain. The pain started years ago following picking up a heavy object and symptoms have been worsening.    Brief history:  Patient has a history of ACDF at C6-7 and a history of posterior laminectomy of the cervical spine at the same levels.  In addition to his bilateral upper extremity radicular symptoms he does have bilateral lower extremity radicular symptoms.  Patient has not been having any signs of myelopathy    Pain Description:    The pain is located in the neck area and radiates to bilateral upper extremities    At BEST  0/10     At WORST  10/10 on the WORST day.      On average pain is rated as 8/10.     Today the pain is rated as 8/10    The pain is described as aching, burning, numbing, shooting, throbbing, tight band and tingling      Symptoms interfere with  daily activity and sleeping.     Exacerbating factors: Sitting, Laying, Bending, Lifting and Getting out of bed/chair.      Mitigating factors medications.     Patient denies .  Patient denies any suicidal or homicidal ideations    Pain Medications:  Current:  Gabapentin 300 mg QID    Tried in Past:  NSAIDs -Never  TCA -Never  SNRI -Never  Opioids-Never    Physical Therapy/Home Exercise: yes       report:  Reviewed and consistent with medication use as prescribed.    Pain Procedures:   12/22/17 T1-2 IL BATSHEVA  1/3/18 T1-2 IL BATSHEVA- 75% relief  12/4/18 T1-2 IL BATSHEVA- 60% relief  1/8/19 T1-2 IL BATSHEVA- limited benefit    Chiropractor -never  Acupuncture - never  TENS unit -never  Spinal decompression - previous ACDF C6-7 previous posterior laminectomy  Joint replacement -never    Imaging:   XRAY C SPINE 12/04/2017    Narrative     X-ray cervical spine AP lateral with flexion and extension demonstrates that the patient has undergone a prior C6-C7 anterior fusion with plate and screws and posterior decompression, and the fusion appears solid.  There is normal alignment, and flexion and extension views show no instability.  T1 is not optimally viewed on the lateral views.   Impression      Postoperative changes at C6-C7, no instability     Narrative     EXAMINATION:  MRI CERVICAL SPINE WITHOUT CONTRAST    CLINICAL HISTORY:  cervical radic; Radiculopathy, cervical region    TECHNIQUE:  Multiplanar, multisequence MR imaging was performed from the skull base to the cervicothoracic junction without the administration of contrast.    COMPARISON:  Cervical spine radiographs 12/05/2017    FINDINGS:  Skull Base and Craniocervical Junction (partially imaged): Normal.    Spinal Alignment: There loss of the normal cervical lordosis.  2 mm of retrolisthesis is present at C3-4.    Vertebrae: There unchanged postsurgical findings of anterior plate/screw fusion at C6-7 with laminectomies of C6 and C7.    Discs: Fusion of the C6-7 disc space.   Otherwise mild disc space height loss and desiccation.    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    Degenerative findings: Retrolisthesis and circumferential disc osteophyte complex at C3-4 with mild rightward uncovertebral hypertrophy results in mild spinal canal stenosis with moderate left and moderate to severe right foraminal stenosis.  Minimal bilateral uncovertebral hypertrophy at C4-5 and small right disc osteophyte complex the results in mild to moderate foraminal stenoses.  Left paracentral disc osteophyte complex is present at C5-6 without associated compressive phenomena.    Paraspinal muscles & soft tissues: Punctate T2 hyperintense thyroid nodules do not meet criteria for further evaluation with ultrasound.      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    Mild multilevel disc/endplate and uncovertebral degeneration, worst at C3-4 where it is superimposed upon mild retrolisthesis and results in mild spinal canal stenosis and moderate to severe foraminal stenoses.         Past Medical History:   Diagnosis Date    Arthritis      Past Surgical History:   Procedure Laterality Date    CSF SHUNT      Injection, Steroid, Epidural CERVICAL T1-2 IL BATSHEVA N/A 1/8/2019    [facility]    Injection, Steroid, Epidural CERVICAL T1-2 INTERLAMINAR BATSHEVA N/A 12/4/2018    [facility]    INJECTION-STEROID-EPIDURAL-CERVICAL N/A 1/3/2018    [facility]    INJECTION-STEROID-EPIDURAL-CERVICAL N/A 12/22/2017    [facility]    JOINT REPLACEMENT  2006    disc Sx , cervical    SPINE SURGERY       Social History     Socioeconomic History    Marital status: Single     Spouse name:  Not on file    Number of children: Not on file    Years of education: Not on file    Highest education level: Not on file   Occupational History    Not on file   Social Needs    Financial resource strain: Not on file    Food insecurity:     Worry: Not on file     Inability: Not on file    Transportation needs:     Medical: Not on file     Non-medical: Not on file   Tobacco Use    Smoking status: Never Smoker    Smokeless tobacco: Never Used   Substance and Sexual Activity    Alcohol use: No    Drug use: No    Sexual activity: Not on file   Lifestyle    Physical activity:     Days per week: Not on file     Minutes per session: Not on file    Stress: Not on file   Relationships    Social connections:     Talks on phone: Not on file     Gets together: Not on file     Attends Church service: Not on file     Active member of club or organization: Not on file     Attends meetings of clubs or organizations: Not on file     Relationship status: Not on file   Other Topics Concern    Not on file   Social History Narrative    Not on file     Family History   Problem Relation Age of Onset    No Known Problems Mother     No Known Problems Father        Review of patient's allergies indicates:  No Known Allergies    Current Outpatient Medications   Medication Sig    AFLURIA QUAD 3601-7489, PF, 60 mcg (15 mcg x 4)/0.5 mL Syrg vaccine TO BE ADMINISTERED BY PHARMACIST FOR IMMUNIZATION    cyclobenzaprine (FLEXERIL) 5 MG tablet Take 1 tablet (5 mg total) by mouth 3 (three) times daily as needed for Muscle spasms.    gabapentin (NEURONTIN) 300 MG capsule TAKE 1 CAPSULE BY MOUTH THREE TIMES A DAY     No current facility-administered medications for this visit.        REVIEW OF SYSTEMS:    GENERAL:  No weight loss, malaise or fevers.  HEENT:   No recent changes in vision or hearing  NECK:  Negative for lumps, no difficulty with swallowing.  RESPIRATORY:  Negative for cough, wheezing or shortness of breath,  "patient denies any recent URI.  CARDIOVASCULAR:  Negative for chest pain, leg swelling or palpitations.  GI:  Negative for abdominal discomfort, blood in stools or black stools or change in bowel habits.  MUSCULOSKELETAL:  See HPI.  SKIN:  Negative for lesions, rash, and itching.  PSYCH:  No mood disorder or recent psychosocial stressors.  Patient's sleep is disturbed secondary to pain.  HEMATOLOGY/LYMPHOLOGY:  Negative for prolonged bleeding, bruising easily or swollen nodes.  Patient is not currently taking any anti-coagulants  ENDO: No history of diabetes or thyroid dysfunction  NEURO:   No history of headaches, syncope, paralysis, seizures or tremors.  All other reviewed and negative other than HPI.    OBJECTIVE:    BP (!) 136/90   Pulse 81   Temp 98.5 °F (36.9 °C) (Oral)   Resp 18   Ht 5' 9" (1.753 m)   BMI 29.83 kg/m²     PHYSICAL EXAMINATION:    GENERAL: Well appearing, in no acute distress, alert and oriented x3.  PSYCH:  Mood and affect appropriate.  SKIN: Skin color, texture, turgor normal, no rashes or lesions.  HEAD/FACE:  Normocephalic, atraumatic. Cranial nerves grossly intact.  NECK: There is pain to palpation over the cervical paraspinous muscles.  Spurling positive bilaterally.  There is pain with neck extension and flexion.  Positive facet loading bilaterally.  CV: RRR with palpation of the radial artery.  PULM: No evidence of respiratory difficulty, symmetric chest rise.  EXTREMITIES: Peripheral joint ROM is full and pain free without obvious instability or laxity in all four extremities. No deformities, edema, or skin discoloration. Good capillary refill.  MUSCULOSKELETAL: Shoulder provocative maneuvers are negative.  Bilateral upper extremity strength is normal and symmetric.  No atrophy or tone abnormalities are noted.  NEURO: Bilateral upper extremity coordination and muscle stretch reflexes are physiologic and symmetric.  Armaan's negative. No clonus.  Decreased sensation to BUE, worse " on the left side.  GAIT: Antalgic- ambulates without assistance         ASSESSMENT: 49 y.o. year old male with neck pain, consistent with the following diagnoses:    Encounter Diagnoses   Name Primary?    Chronic pain syndrome Yes    Postlaminectomy syndrome of cervical region     Cervical radiculopathy     Cervical spondylosis without myelopathy     Cervical radiculitis     Degeneration of cervical intervertebral disc     Neck pain        PLAN:     - Previous imaging was reviewed and discussed with the patient today.    - Previously, cervical SCS trial was denied by his insurance.  I will order updated MRI and have him see Herlinda and Dr. Jaramillo again to discuss surgical options.    - Continue Gabapentin 300 mg to increase to 5x/day- 2 in the afternoon and 3 at night.  He cannot tolerate more in the daytime.    - We discussed that we do not take patients out of work or write for disability.  We discussed an FCE vs speaking with his PCP.  I will write him a letter with his diagnoses.    - The patient will continue a home exercise routine to help with pain and strengthening.      - Follow-up after neurosurgery visit.        The above plan and management options were discussed at length with patient. Patient is in agreement with the above and verbalized understanding.     Oly Grissom  08/14/2019

## 2019-08-14 NOTE — LETTER
August 14, 2019      [facility] [address]  [address]  [facility] [address]  Phone: [phone]  Fax: [phone]       Patient: Heraclio Lam   YOB: 1970  Date of Visit: 08/14/2019    To Whom It May Concern:    Page Lam  was at [facility] on 08/14/2019. He has a history of neck pain secondary to previous neck surgery and cervical radiculopathy. If you have any questions or concerns, or if I can be of further assistance, please do not hesitate to contact me.    Sincerely,      YAMILKA Hillman

## 2019-08-16 ENCOUNTER — HOSPITAL ENCOUNTER (OUTPATIENT)
Dept: RADIOLOGY | Facility: OTHER | Age: 49
Discharge: HOME | End: 2019-08-16
Attending: NURSE PRACTITIONER
Payer: COMMERCIAL

## 2019-08-16 DIAGNOSIS — M96.1 POSTLAMINECTOMY SYNDROME OF CERVICAL REGION: ICD-10-CM

## 2019-08-16 DIAGNOSIS — G89.4 CHRONIC PAIN SYNDROME: ICD-10-CM

## 2019-08-16 DIAGNOSIS — M54.12 CERVICAL RADICULOPATHY: ICD-10-CM

## 2019-08-16 DIAGNOSIS — M47.812 CERVICAL SPONDYLOSIS WITHOUT MYELOPATHY: ICD-10-CM

## 2019-08-16 PROCEDURE — 72141 MRI NECK SPINE W/O DYE: CPT | Mod: 26,,, | Performed by: SPECIALIST

## 2019-08-16 PROCEDURE — 72141 MRI CERVICAL SPINE WITHOUT CONTRAST: ICD-10-PCS | Mod: 26,,, | Performed by: SPECIALIST

## 2019-08-16 PROCEDURE — 72141 MRI NECK SPINE W/O DYE: CPT | Mod: TC

## 2019-08-19 ENCOUNTER — TELEPHONE (OUTPATIENT)
Dept: SPINE | Facility: CLINIC | Age: 49
End: 2019-08-19

## 2019-08-19 DIAGNOSIS — M54.5 LOW BACK PAIN, UNSPECIFIED BACK PAIN LATERALITY, UNSPECIFIED CHRONICITY, WITH SCIATICA PRESENCE UNSPECIFIED: Primary | ICD-10-CM

## 2019-08-20 ENCOUNTER — TELEPHONE (OUTPATIENT)
Dept: SPINE | Facility: CLINIC | Age: 49
End: 2019-08-20

## 2019-08-20 ENCOUNTER — OFFICE VISIT (OUTPATIENT)
Dept: SPINE | Facility: CLINIC | Age: 49
End: 2019-08-20
Payer: COMMERCIAL

## 2019-08-20 ENCOUNTER — HOSPITAL ENCOUNTER (OUTPATIENT)
Dept: RADIOLOGY | Facility: OTHER | Age: 49
Discharge: HOME | End: 2019-08-20
Attending: PHYSICIAN ASSISTANT
Payer: COMMERCIAL

## 2019-08-20 VITALS
DIASTOLIC BLOOD PRESSURE: 77 MMHG | HEIGHT: 69 IN | WEIGHT: 202 LBS | BODY MASS INDEX: 29.92 KG/M2 | TEMPERATURE: 99 F | HEART RATE: 72 BPM | SYSTOLIC BLOOD PRESSURE: 132 MMHG

## 2019-08-20 DIAGNOSIS — M54.2 CERVICALGIA: Primary | ICD-10-CM

## 2019-08-20 DIAGNOSIS — M43.12 SPONDYLOLISTHESIS OF CERVICAL REGION: ICD-10-CM

## 2019-08-20 DIAGNOSIS — M50.20 HNP (HERNIATED NUCLEUS PULPOSUS), CERVICAL: ICD-10-CM

## 2019-08-20 DIAGNOSIS — M47.812 CERVICAL SPONDYLOSIS WITHOUT MYELOPATHY: ICD-10-CM

## 2019-08-20 DIAGNOSIS — M50.30 DDD (DEGENERATIVE DISC DISEASE), CERVICAL: ICD-10-CM

## 2019-08-20 DIAGNOSIS — M54.2 NECK PAIN: Primary | ICD-10-CM

## 2019-08-20 DIAGNOSIS — M48.02 CERVICAL SPINAL STENOSIS: ICD-10-CM

## 2019-08-20 DIAGNOSIS — M54.5 LOW BACK PAIN, UNSPECIFIED BACK PAIN LATERALITY, UNSPECIFIED CHRONICITY, WITH SCIATICA PRESENCE UNSPECIFIED: ICD-10-CM

## 2019-08-20 PROCEDURE — 72050 X-RAY EXAM NECK SPINE 4/5VWS: CPT | Mod: TC,FY

## 2019-08-20 PROCEDURE — 99999 PR PBB SHADOW E&M-EST. PATIENT-LVL III: CPT | Mod: PBBFAC,,, | Performed by: PHYSICIAN ASSISTANT

## 2019-08-20 PROCEDURE — 99999 PR PBB SHADOW E&M-EST. PATIENT-LVL III: ICD-10-PCS | Mod: PBBFAC,,, | Performed by: PHYSICIAN ASSISTANT

## 2019-08-20 PROCEDURE — 3008F BODY MASS INDEX DOCD: CPT | Mod: CPTII,S$GLB,, | Performed by: PHYSICIAN ASSISTANT

## 2019-08-20 PROCEDURE — 99214 PR OFFICE/OUTPT VISIT, EST, LEVL IV, 30-39 MIN: ICD-10-PCS | Mod: S$GLB,,, | Performed by: PHYSICIAN ASSISTANT

## 2019-08-20 PROCEDURE — 3008F PR BODY MASS INDEX (BMI) DOCUMENTED: ICD-10-PCS | Mod: CPTII,S$GLB,, | Performed by: PHYSICIAN ASSISTANT

## 2019-08-20 PROCEDURE — 72050 XR CERVICAL SPINE AP LAT WITH FLEX EXTEN: ICD-10-PCS | Mod: 26,,, | Performed by: RADIOLOGY

## 2019-08-20 PROCEDURE — 72050 X-RAY EXAM NECK SPINE 4/5VWS: CPT | Mod: 26,,, | Performed by: RADIOLOGY

## 2019-08-20 PROCEDURE — 99214 OFFICE O/P EST MOD 30 MIN: CPT | Mod: S$GLB,,, | Performed by: PHYSICIAN ASSISTANT

## 2019-08-20 RX ORDER — IBUPROFEN 200 MG
200 TABLET ORAL EVERY 6 HOURS PRN
COMMUNITY

## 2019-08-20 NOTE — PROGRESS NOTES
"Subjective:     Patient ID:  Heraclio Lam is a 49 y.o. male.    Clint    Chief Complaint: Neck pain.  Bilateral arm and leg numbness    HPI    Heraclio Lam is a 49 y.o. male who presents for follow up.  I had seen him in the past with Dr. Jaramillo about 2 years ago.  No cpsine surgery recommended at that time.  He has been following with pain management who has tried injections with little benefit and SCS was not approved by his insurance.  He is being referred back to Dr. Jaramillo to discuss possible cspine surgery options.    He had a spinal cord shunt placed by Dr. Carlson in 1999 and also a C6-7 ACDF with Dr. Carlson in 2006.    Neck pain comes and goes and is worse on the right side of the neck. Neck pain worse in the last year.  Worse with laying down and better with LLB.  He has constant numbness in the bilateral arms/hands and legs/feet that has gotten worse in the last year.  He feels like he has more weakness in the arms and legs in the past year.  No arm pain.    Patient denies any recent accidents or trauma, no saddle anesthesias, and no bowel or bladder incontinence.    Patient has difficulty with balance or gait, some difficulty tying shoes or buttoning clothes, is dropping things, does not have difficulty opening containers, and has had some change in handwriting.    Review of Systems:  Constitution: Negative for chills, fever, night sweats and weight loss.   Musculoskeletal: Negative for falls.   Gastrointestinal: Negative for bowel incontinence, nausea and vomiting.   Genitourinary: Negative for bladder incontinence.   Neurological: Positive for disturbances in coordination and loss of balance.      Objective:      Vitals:    08/20/19 0942   BP: 132/77   Pulse: 72   Temp: 98.5 °F (36.9 °C)   Weight: 91.6 kg (202 lb)   Height: 5' 9" (1.753 m)   PainSc: 0-No pain   PainLoc: Neck         Physical Exam:    General:  Heraclio Lam is well-developed, well-nourished, appears " stated age, in no acute distress, alert and oriented to person, place, and time.    Pulmonary/Chest:  Respiratory effort normal  Abdominal: Exhibits no distension  Psychiatric:  Normal mood and affect.  Behavior is normal.  Judgement and thought content normal    Musculoskeletal:    Patient arises from a sitting to standing position without difficulty.  Patient walks to the door without evidence of limp, pain, or abnormality of gait. Patient is able to walk heel to toe at a slow pace.    Cervical ROM:   No pain in cervical spine flexion, extension, right lateral bending, left lateral bending, right rotation, and left rotation.    Cervical Spine Inspection:  Healed anterior and posterior surgical scars with no visible rashes.    Cervical Spine Palpation:  No tenderness to cervical spine palpation.    Neurological:    Muscle strength against resistance:     Right Left   Deltoid  5 / 5 5 / 5   Biceps  5 / 5 5 / 5        Triceps 5 / 5 5 / 5   Wrist flexion  5 / 5 5 / 5   Wrist extension 5 / 5 5 / 5   Finger abduction 5 / 5 5 / 5   Thumb opposition 5 / 5 5 / 5   Handgrip 5 / 5 5 / 5   Hip flexion  5 / 5 5 / 5   Hip extension 5 / 5 5 / 5   Hip abduction 5 / 5 5 / 5   Hip adduction 5/ 5 5 / 5   Knee extension  5 / 5 5 / 5   Knee flexion  5 / 5 5 / 5   Dorsiflexion  5 / 5 5 / 5   EHL  5 / 5 5 / 5   Plantar flexion  5 / 5 5 / 5   Inversion of the feet 5 / 5 5 / 5   Eversion of the feet 5 / 5 5 / 5     Reflexes:     Right Left   Triceps 2+ 2+   Biceps 2+ 2+   Brachioradialis 2+ 2+   Patellar 2+ 2+   Achilles 2+ 2+     Babinski: Negative bilaterally  Clonus:  Negative bilaterally  Lopez: Negative bilaterally  Negative lhermittes sign    On gross examination of the bilateral lower extremities, patient has full painfree ROM with no signs of clubbing, cyanosis, edema, and weakness.    XRAY/MRI Interpretation:     Cervical spine ap/lateral/flexion/extension xrays were personally reviewed today.  No fractures.  Retrolisthesis and  some movement at C3-4 on flexion and extension.  C6-7 hardware intact.    Cervical spine MRI was personally reviewed today.  C3-4 HNP with moderate central and NFS.  Modic changes at the endplates.  Syrinx in the lower cervical cord.      Assessment:          1. Neck pain    2. Cervical spondylosis without myelopathy    3. DDD (degenerative disc disease), cervical    4. HNP (herniated nucleus pulposus), cervical    5. Cervical spinal stenosis    6. Spondylolisthesis of cervical region            Plan:             Patient with previous cervical cord csf shunt in 1999 and C6-7 ACDF in 2006 with Dr. Carlson    Has C3-4 retrolisthesis, HNP, and moderate central stenosis    -Has failed conservative management with pain management  -Will refer to Dr. Jaramillo to discuss surgical options      Follow-Up:  Follow up if symptoms worsen or fail to improve. If there are any questions prior to this, the patient was instructed to contact the office.       Herlinda Baird, [facility], PA-C  Neurosurgery  [facility]  [facility]

## 2019-09-13 ENCOUNTER — PATIENT MESSAGE (OUTPATIENT)
Dept: SPINE | Facility: CLINIC | Age: 49
End: 2019-09-13

## 2019-09-13 ENCOUNTER — TELEPHONE (OUTPATIENT)
Dept: SPINE | Facility: CLINIC | Age: 49
End: 2019-09-13

## 2019-09-13 NOTE — TELEPHONE ENCOUNTER
----- Message from Doretha Pate RN sent at 9/13/2019  1:01 PM CDT -----  Please refer to Dr. Mathew as Dr. Jaramillo is unable to assume his care.

## 2019-09-13 NOTE — TELEPHONE ENCOUNTER
Left message advising patient of the need to cancel his appt on 9/19 due to provider has taken a PRIMO and will not be seeing patient in the clinic. Advised patient to call the office back in regards to scheduling with another provider to be seen.

## 2019-11-22 DIAGNOSIS — M54.2 NECK PAIN: ICD-10-CM

## 2019-11-22 DIAGNOSIS — M47.812 CERVICAL SPONDYLOSIS WITHOUT MYELOPATHY: ICD-10-CM

## 2019-11-22 DIAGNOSIS — M50.30 DEGENERATION OF CERVICAL INTERVERTEBRAL DISC: ICD-10-CM

## 2019-11-22 DIAGNOSIS — M54.12 CERVICAL RADICULITIS: ICD-10-CM

## 2019-11-22 RX ORDER — GABAPENTIN 300 MG/1
CAPSULE ORAL
Qty: 150 CAPSULE | Refills: 2 | Status: SHIPPED | OUTPATIENT
Start: 2019-11-22 | End: 2022-10-04

## 2019-12-21 ENCOUNTER — PATIENT MESSAGE (OUTPATIENT)
Dept: SPINE | Facility: CLINIC | Age: 49
End: 2019-12-21

## 2020-01-10 ENCOUNTER — OFFICE VISIT (OUTPATIENT)
Dept: NEUROSURGERY | Facility: CLINIC | Age: 50
End: 2020-01-10
Payer: COMMERCIAL

## 2020-01-10 VITALS
DIASTOLIC BLOOD PRESSURE: 78 MMHG | HEART RATE: 77 BPM | WEIGHT: 202 LBS | HEIGHT: 69 IN | SYSTOLIC BLOOD PRESSURE: 130 MMHG | BODY MASS INDEX: 29.92 KG/M2

## 2020-01-10 DIAGNOSIS — M54.12 CERVICAL RADICULOPATHY: Primary | ICD-10-CM

## 2020-01-10 DIAGNOSIS — G89.4 CHRONIC PAIN SYNDROME: ICD-10-CM

## 2020-01-10 DIAGNOSIS — M47.22 CERVICAL SPONDYLOSIS WITH RADICULOPATHY: ICD-10-CM

## 2020-01-10 DIAGNOSIS — M96.1 CERVICAL POST-LAMINECTOMY SYNDROME: ICD-10-CM

## 2020-01-10 PROCEDURE — 99999 PR PBB SHADOW E&M-EST. PATIENT-LVL III: ICD-10-PCS | Mod: PBBFAC,,, | Performed by: PHYSICIAN ASSISTANT

## 2020-01-10 PROCEDURE — 99214 OFFICE O/P EST MOD 30 MIN: CPT | Mod: S$GLB,,, | Performed by: PHYSICIAN ASSISTANT

## 2020-01-10 PROCEDURE — 3008F PR BODY MASS INDEX (BMI) DOCUMENTED: ICD-10-PCS | Mod: CPTII,S$GLB,, | Performed by: PHYSICIAN ASSISTANT

## 2020-01-10 PROCEDURE — 3008F BODY MASS INDEX DOCD: CPT | Mod: CPTII,S$GLB,, | Performed by: PHYSICIAN ASSISTANT

## 2020-01-10 PROCEDURE — 99999 PR PBB SHADOW E&M-EST. PATIENT-LVL III: CPT | Mod: PBBFAC,,, | Performed by: PHYSICIAN ASSISTANT

## 2020-01-10 PROCEDURE — 99214 PR OFFICE/OUTPT VISIT, EST, LEVL IV, 30-39 MIN: ICD-10-PCS | Mod: S$GLB,,, | Performed by: PHYSICIAN ASSISTANT

## 2020-01-10 NOTE — PROGRESS NOTES
History & Physical    SUBJECTIVE:     Chief Complaint:  Neck pain    History of Present Illness:  Heraclio Lam is 49 y.o.  male with history of a spinal cord shunt placed by Dr. Carlson in 1999 for cervical cord syrinx and also a C6-7 ACDF with Dr. Carlson in 2006 who developed post laminectomy syndrome/chronic pain syndrome and was scheduled for cervical spinal cord stimulator in August with Dr. Juarez but insurance denied.  He previously saw Herlinda Baird PA-C; patient presents for neurosurgical evaluation as referral.    Patient is here with his mother.  Patient is slightly poor historian; he is very agreeable.  He reports of chronic intermittent neck pain and bilateral hand numbness/tingling since 1999 when he was diagnosed with cervical cord syrinx.  Previous cervical fusion slightly improved pain.  However, pain has progressively worsened.  He has now mostly complaining of bilateral anterior neck and posterior neck intermittent burning sensation.  Pain worsens with limited activity or when he is not mobile.  Pain improves with physical activity and gabapentin (300 q.a.m., Q lunch, 900 q.h.s.).  Feels like he is intermittently dropping things in his hands secondary to abnormal sensation/numbness in his fingers.  Denies any hand weakness, bladder/bowel incontinence, or gait instability.  He has tried multiple cervical epidural injections in 2019 with Dr. Juarez with no significant relief.               Neck Disability  Neck Disability-Pain Score: 7  Neck Disability-Pain Intensity: The pain is very mild at the moment  Neck Disability-Personal Care: I can look after myself normally without causing extra pain  Neck Disability-Lifting: I can lift very light weights  Neck Disability-Reading: I can hardly read at all, because of severe pain in my neck  Neck Disability-Headaches: I have slight headaches that come infrequently  Neck Disability-Concentration: I have a fair degree of difficulty in  concentratng when I want to  Neck Disability-work: I cannot do my usual work  Neck Disability-Driving: I cant drive my car as long as I want because od moderate pain in my neck  Neck Disability-Sleeping: My sleep is moderately disturbed (2-3 hours sleeplessness)  Neck Disability-Recreation: I am able to engaged in few of my usual recreation activities because of pain in my neck      Review of patient's allergies indicates:  No Known Allergies    Current Outpatient Medications   Medication Sig Dispense Refill    gabapentin (NEURONTIN) 300 MG capsule TAKE 1 CAPSULE BY MOUTH 5 TIMES DAILY. TAKE 2 IN THE AFTERNOON AND 3 AT BEDTIME 150 capsule 2    ibuprofen (ADVIL,MOTRIN) 200 MG tablet Take 200 mg by mouth every 6 (six) hours as needed for Pain.       No current facility-administered medications for this visit.        Past Medical History:   Diagnosis Date    Arthritis      Past Surgical History:   Procedure Laterality Date    CSF SHUNT      EPIDURAL STEROID INJECTION N/A 12/4/2018    Procedure: Injection, Steroid, Epidural CERVICAL T1-2 INTERLAMINAR BATSHEVA;  Surgeon: Elba Juarez MD;  Location: [facility];  Service: Pain Management;  Laterality: N/A;    EPIDURAL STEROID INJECTION N/A 1/8/2019    Procedure: Injection, Steroid, Epidural CERVICAL T1-2 IL BATSHEVA;  Surgeon: Elba Juarez MD;  [facility];  Service: Pain Management;  Laterality: N/A;    JOINT REPLACEMENT  2006    disc Sx , cervical    SPINE SURGERY       Family History     Problem Relation (Age of Onset)    No Known Problems Mother, Father        Social History     Socioeconomic History    Marital status: Single     Spouse name: Not on file    Number of children: Not on file    Years of education: Not on file    Highest education level: Not on file   Occupational History    Not on file   Social Needs    Financial resource strain: Not on file    Food insecurity:     Worry: Not on file     Inability: Not on file     "Transportation needs:     Medical: Not on file     Non-medical: Not on file   Tobacco Use    Smoking status: Never Smoker    Smokeless tobacco: Never Used   Substance and Sexual Activity    Alcohol use: No    Drug use: No    Sexual activity: Not on file   Lifestyle    Physical activity:     Days per week: Not on file     Minutes per session: Not on file    Stress: Not on file   Relationships    Social connections:     Talks on phone: Not on file     Gets together: Not on file     Attends Episcopalian service: Not on file     Active member of club or organization: Not on file     Attends meetings of clubs or organizations: Not on file     Relationship status: Not on file   Other Topics Concern    Not on file   Social History Narrative    Not on file       Review of Systems:  Review of Systems    Constitutional: no fever, chills or night sweats. No changes in weight   Eyes: no visual changes   ENT: no nasal congestion or sore throat   Respiratory: no cough or shortness of breath   Cardiovascular: no chest pain or palpitations   Gastrointestinal: no nausea or vomiting   Genitourinary: no hematuria or dysuria   Integument/Breast: no rash or pruritis   Hematologic/Lymphatic: no easy bruising or lymphadenopathy   Musculoskeletal: no arthralgias or myalgias.  Positive for bilateral neck pain.  Neurological: no seizures or tremors. No weakness.  Positive for hand paresthesia.   Behavioral/Psych: no auditory or visual hallucinations   Endocrine: no heat or cold intolerance       OBJECTIVE:     Vital Signs  Pulse: 77  BP: 130/78  Pain Score:   7  Height: 5' 9" (175.3 cm)  Weight: 91.6 kg (202 lb)  Body mass index is 29.83 kg/m².      Physical Exam:  Neurosurgery Physical Exam    General: well developed, well nourished, no distress.  Flat affect.  Neurologic: Awake, alert and oriented x3. Thought content appropriate.  GCS: Motor: 6/Verbal: 5/Eyes: 4 GCS Total: 15  Cranial nerves: II-XII grossly intact. PERRLA. EOMI " without nystagmus. Face symmetric and sensation intact to light touch, tongue midline, shoulder shrug symmetric bilaterally.  Hearing equal bilaterally to finger rub. Palate and uvula rise and fall normally in midline.    Language: no aphasia  Speech: no dysarthria   Neck: supple, without obvious masses    Sensory: intact to light touch B/L UE and LE  Motor Strength: Moves all extremities spontaneously with good tone. Full strength upper and lower extremities. No abnormal movements seen.    Strength  Deltoids Triceps Biceps Wrist Extension Wrist Flexion Hand    Upper: R 5/5 5/5 5/5 5/5 5/5 5/5    L 5/5 5/5 5/5 5/5 5/5 5/5     Iliopsoas Quadriceps Knee  Flexion Tibialis  anterior Gastro- cnemius EHL   Lower: R 5/5 5/5 5/5 5/5 5/5 5/5    L 5/5 5/5 5/5 5/5 5/5 5/5     Interossi muscle strength- intact    DTR's - 1 + and symmetric in UE and LE  Lopez's - Negative        Lhermitte's - Negative  Spurlings-   Negative         Ankle Clonus - Negative           Babinski  - Negative     SLR - Negative   Gait - normal      Tandem Gait - No difficulty    Able to walk/stand on heels & toes  Cervical ROM and Lumbar ROM - full; no pain with all ROM  No focal numbness or weakness  No midline or paraspinal tenderness to palpation  No difficulty transitioning from seated to standing position or vice versa.  ENT: normal hearing with finger rub  Heart: RRR, no cyanosis, pallor, or edema.   Lungs- normal respiratory effort  Abdomen-  soft, symmetric and nontender  Skin: grossly intact in all 4 extremities without obvious rashes or lesions  Extremities: warm with no cyanosis or edema, or clubbing  Pulses: palpable distal pulses    Posture-  No obvious kyphosis with standing posture.  With bending posture, no obvious scapula wing    Previous anterior neck incision and posterior cervical/thoracic incision is well healed.    Diagnostic Results:  All imaging personally reviewed    MRI cervical spine 08/16/2019 compared to previous  January 23rd 2019  Cervical lordosis well maintained.  Multilevel degenerative disc disease.  C3-4 disc disease causing moderate canal stenosis which has slightly worsened when compared to previous imaging; there is corresponding retrolisthesis at this level.  C5-6 disc disease and focal herniation causing mild canal stenosis.    Cervical spinal cord syrinx seen at C3-4-C7-T1 which appears grossly stable.    Cervical x-rays flexion-extension 08/20/2019  Multilevel degenerative disc disease.  C3-4 mild retrolisthesis.  Previous C6-7 ACDF.    ASSESSMENT/PLAN:     49 y.o.  male with history of a spinal cord shunt placed by Dr. Carlson in 1999 for cervical cord syrinx and also a C6-7 ACDF with Dr. Carlson in 2006 who developed post laminectomy syndrome/chronic pain syndrome     -patient is neurologically intact on exam with no concerning signs of cervical myelopathy.  -his neck pain and bilateral hand paresthesias may be multifactorial from chronic cervical cord syrinx (stable), myofascial pain syndrome, chronic pain syndrome, and/or worsening of disc disease at C3-4.  -at this time, we will start conservative treatment.  Refer patient to PT for neck therapy.  He is to continue current gabapentin dose per pain management.  -referral to Dr. Mathew discussed possible surgical treatment options in next 6-8 weeks.       All imaging were reviewed with patient and mother. All questions were answered.  Patient verbalized understanding and agreed with the above plan of care.  Patient was encouraged to call clinic with any future concerns prior to follow up appt.    Please call with any questions.        [facility]      Note dictated with voice recognition software, please excuse any grammatical errors.

## 2020-01-10 NOTE — LETTER
January 10, 2020      Shalom Jaramillo MD  [address]  [facility] [address]           [facility]  [address]  [facility] [address]  Phone: [phone]          Patient: Heraclio Lam   MR Number: [id number]   YOB: 1970   Date of Visit: 1/10/2020       Dear Dr. Shalom Jaramillo:    Thank you for referring Heraclio Lam to me for evaluation. Attached you will find relevant portions of my assessment and plan of care.    If you have questions, please do not hesitate to call me. I look forward to following Heraclio Lam along with you.    Sincerely,    Milad Barrios PA-C    Enclosure  CC:  No Recipients    If you would like to receive this communication electronically, please contact [email or web address] or [phone] to request more information on [facility] access.    For providers and/or their staff who would like to refer a patient to [facility], please contact us through our one-stop-shop provider referral line, [facility], at [phone].    If you feel you have received this communication in error or would no longer like to receive these types of communications, please e-mail [email or web address]

## 2020-03-16 ENCOUNTER — OFFICE VISIT (OUTPATIENT)
Dept: NEUROSURGERY | Facility: CLINIC | Age: 50
End: 2020-03-16
Payer: COMMERCIAL

## 2020-03-16 VITALS — SYSTOLIC BLOOD PRESSURE: 127 MMHG | HEART RATE: 65 BPM | DIASTOLIC BLOOD PRESSURE: 81 MMHG

## 2020-03-16 DIAGNOSIS — G89.29 CHRONIC NECK PAIN: Primary | ICD-10-CM

## 2020-03-16 DIAGNOSIS — M54.2 CHRONIC NECK PAIN: Primary | ICD-10-CM

## 2020-03-16 DIAGNOSIS — G95.0 SYRINGOMYELIA: ICD-10-CM

## 2020-03-16 DIAGNOSIS — Z98.1 STATUS POST CERVICAL SPINAL FUSION: ICD-10-CM

## 2020-03-16 PROCEDURE — 99999 PR PBB SHADOW E&M-EST. PATIENT-LVL III: CPT | Mod: PBBFAC,,, | Performed by: NEUROLOGICAL SURGERY

## 2020-03-16 PROCEDURE — 99213 PR OFFICE/OUTPT VISIT, EST, LEVL III, 20-29 MIN: ICD-10-PCS | Mod: S$GLB,,, | Performed by: NEUROLOGICAL SURGERY

## 2020-03-16 PROCEDURE — 99999 PR PBB SHADOW E&M-EST. PATIENT-LVL III: ICD-10-PCS | Mod: PBBFAC,,, | Performed by: NEUROLOGICAL SURGERY

## 2020-03-16 PROCEDURE — 99213 OFFICE O/P EST LOW 20 MIN: CPT | Mod: S$GLB,,, | Performed by: NEUROLOGICAL SURGERY

## 2020-03-16 NOTE — PROGRESS NOTES
NEUROSURGICAL PROGRESS NOTE    DATE OF SERVICE:  03/16/2020    ATTENDING PHYSICIAN:  Raul Mathew MD    SUBJECTIVE:    INTERIM HISTORY:    This is a very pleasant 49 y.o. male, who is status post C5-6 ACDF.  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  He has been complaining of chronic neck pain.  However he states that his pain at the moment is not interfering with his functional level.  He is dropping things occasionally.  No new onset of gait imbalance or sphincter dysfunction.  He would be willing to do more physical therapy.  He states that the gabapentin is helping with his pain.  He is taking 300 in the morning 300 at noon and 600 at night.              PAST MEDICAL HISTORY:  Active Ambulatory Problems     Diagnosis Date Noted    Cervical radiculopathy 12/15/2017    Chronic pain syndrome 12/15/2017    Cervical post-laminectomy syndrome 12/15/2017    Chronic pain 12/22/2017    Cervical spondylosis with radiculopathy 01/10/2020     Resolved Ambulatory Problems     Diagnosis Date Noted    No Resolved Ambulatory Problems     Past Medical History:   Diagnosis Date    Arthritis        PAST SURGICAL HISTORY:  Past Surgical History:   Procedure Laterality Date    CSF SHUNT      EPIDURAL STEROID INJECTION N/A 12/4/2018    Procedure: Injection, Steroid, Epidural CERVICAL T1-2 INTERLAMINAR BATSHEVA;  Surgeon: Elba Juarez MD;  Location: [facility] [place] [facility];  Service: Pain Management;  Laterality: N/A;    EPIDURAL STEROID INJECTION N/A 1/8/2019    Procedure: Injection, Steroid, Epidural CERVICAL T1-2 IL BATSHEVA;  Surgeon: Elba Juarez MD;  Location: [facility] [place] [facility];  Service: Pain Management;  Laterality: N/A;    JOINT REPLACEMENT  2006    disc Sx , cervical    SPINE SURGERY         SOCIAL HISTORY:   Social History     Socioeconomic History    Marital status: Single     Spouse name: Not on file    Number of children: Not on file    Years of education: Not on file    Highest education level:  Not on file   Occupational History    Not on file   Social Needs    Financial resource strain: Not on file    Food insecurity:     Worry: Not on file     Inability: Not on file    Transportation needs:     Medical: Not on file     Non-medical: Not on file   Tobacco Use    Smoking status: Never Smoker    Smokeless tobacco: Never Used   Substance and Sexual Activity    Alcohol use: No    Drug use: No    Sexual activity: Not on file   Lifestyle    Physical activity:     Days per week: Not on file     Minutes per session: Not on file    Stress: Not on file   Relationships    Social connections:     Talks on phone: Not on file     Gets together: Not on file     Attends Jewish service: Not on file     Active member of club or organization: Not on file     Attends meetings of clubs or organizations: Not on file     Relationship status: Not on file   Other Topics Concern    Not on file   Social History Narrative    Not on file       FAMILY HISTORY:  Family History   Problem Relation Age of Onset    No Known Problems Mother     No Known Problems Father        CURRENTS MEDICATIONS:  Current Outpatient Medications on File Prior to Visit   Medication Sig Dispense Refill    gabapentin (NEURONTIN) 300 MG capsule TAKE 1 CAPSULE BY MOUTH 5 TIMES DAILY. TAKE 2 IN THE AFTERNOON AND 3 AT BEDTIME 150 capsule 2    ibuprofen (ADVIL,MOTRIN) 200 MG tablet Take 200 mg by mouth every 6 (six) hours as needed for Pain.       No current facility-administered medications on file prior to visit.        ALLERGIES:  Review of patient's allergies indicates:  No Known Allergies    REVIEW OF SYSTEMS:  Review of Systems   Constitutional: Negative for diaphoresis, fever and weight loss.   Respiratory: Negative for shortness of breath.    Cardiovascular: Negative for chest pain.   Gastrointestinal: Negative for blood in stool.   Genitourinary: Negative for hematuria.   Endo/Heme/Allergies: Does not bruise/bleed easily.   All other  systems reviewed and are negative.        OBJECTIVE:    PHYSICAL EXAMINATION:   Vitals:    03/16/20 0913   BP: 127/81   Pulse: 65       Physical Exam:  Vitals reviewed.    Constitutional: He appears well-developed and well-nourished.     Eyes: Pupils are equal, round, and reactive to light. Conjunctivae and EOM are normal.     Cardiovascular: Normal distal pulses and no edema.     Abdominal: Soft.     Skin: Skin displays no rash on trunk and no rash on extremities. Skin displays no lesions on trunk and no lesions on extremities.     Psych/Behavior: He is alert. He is oriented to person, place, and time. He has a normal mood and affect.     Musculoskeletal:        Neck: Range of motion is limited.     Neurological:        DTRs: Tricep reflexes are 2+ on the right side and 2+ on the left side. Bicep reflexes are 2+ on the right side and 2+ on the left side. Brachioradialis reflexes are 2+ on the right side and 2+ on the left side. Patellar reflexes are 2+ on the right side and 2+ on the left side. Achilles reflexes are 2+ on the right side and 2+ on the left side.       Back Exam     Tenderness   The patient is experiencing tenderness in the cervical.    Range of Motion   Extension: normal   Flexion: normal   Lateral bend right: normal   Lateral bend left: normal   Rotation right: normal   Rotation left: normal     Muscle Strength   Right Quadriceps:  5/5   Left Quadriceps:  5/5   Right Hamstrings:  5/5   Left Hamstrings:  5/5     Tests   Straight leg raise right: negative  Straight leg raise left: negative    Other   Toe walk: normal  Heel walk: normal              Neurologic Exam     Mental Status   Oriented to person, place, and time.   Speech: speech is normal   Level of consciousness: alert    Cranial Nerves   Cranial nerves II through XII intact.     CN III, IV, VI   Pupils are equal, round, and reactive to light.  Extraocular motions are normal.     Motor Exam   Muscle bulk: normal  Overall muscle tone:  normal    Strength   Right deltoid: 5/5  Left deltoid: 5/5  Right biceps: 5/5  Left biceps: 5/5  Right triceps: 5/5  Left triceps: 5/5  Right wrist flexion: 5/5  Left wrist flexion: 5/5  Right wrist extension: 5/5  Left wrist extension: 5/5  Right interossei: 5/5  Left interossei: 5/5  Right iliopsoas: 5/5  Left iliopsoas: 5/5  Right quadriceps: 5/5  Left quadriceps: 5/5  Right hamstrin/5  Left hamstrin/5  Right anterior tibial: 5/5  Left anterior tibial: 5/5  Right posterior tibial: 5/5  Left posterior tibial: 5/5  Right peroneal: 5/5  Left peroneal: 5/5  Right gastroc: 5/5  Left gastroc: 5/5    Sensory Exam   Light touch normal.   Pinprick normal.     Gait, Coordination, and Reflexes     Gait  Gait: normal    Coordination   Finger to nose coordination: normal  Tandem walking coordination: normal    Reflexes   Right brachioradialis: 2+  Left brachioradialis: 2+  Right biceps: 2+  Left biceps: 2+  Right triceps: 2+  Left triceps: 2+  Right patellar: 2+  Left patellar: 2+  Right achilles: 2+  Left achilles: 2+  Right plantar: normal  Left plantar: normal  Right Lopez: absent  Left Lopez: absent  Right ankle clonus: absent  Left ankle clonus: absent        DIAGNOSTIC DATA:  I personally interpreted the following imaging:   Cervical spine MRI 2019 shows C5-6 ACDF, mild C3-4 central canal stenosis, bilateral C3-4 moderate foraminal stenosis, small syringomyelia from C 5 to T1    ASSESMENT:  This is a 49 y.o. male with     Problem List Items Addressed This Visit     None      Visit Diagnoses     Chronic neck pain    -  Primary    Relevant Orders    Ambulatory referral/consult to Physical Therapy    Syringomyelia        Relevant Orders    Ambulatory referral/consult to Physical Therapy    Status post cervical spinal fusion        Relevant Orders    Ambulatory referral/consult to Physical Therapy            PLAN:  Cervical PT 3 times a week for 6 weeks  I explained to him that he can increase his gabapentin to  600 mg 3 times daily or even 900 mg 3 times daily as needed  The gabapentin is at this time the best option to treat his syringomyelia pain.  Will follow-up as needed in Neurosurgery  All questions answered            Raul Mathew MD  Cell:[phone]

## 2020-07-28 ENCOUNTER — TELEPHONE (OUTPATIENT)
Dept: PAIN MEDICINE | Facility: CLINIC | Age: 50
End: 2020-07-28

## 2020-07-28 NOTE — TELEPHONE ENCOUNTER
Staff called to confirm 07/29/20 1 pm audio call with Oly Grissom Np. Patient was informed that Oly has a 30 minute window to contact him from the time of his appointment.    Patient did not answer home/ nor mobile number therefore staff left a detailed voice message on both numbers informing the patient of the above information.

## 2020-07-29 ENCOUNTER — OFFICE VISIT (OUTPATIENT)
Dept: PAIN MEDICINE | Facility: CLINIC | Age: 50
End: 2020-07-29
Payer: COMMERCIAL

## 2020-07-29 DIAGNOSIS — M54.12 CERVICAL RADICULOPATHY: ICD-10-CM

## 2020-07-29 DIAGNOSIS — G89.4 CHRONIC PAIN SYNDROME: ICD-10-CM

## 2020-07-29 DIAGNOSIS — M96.1 POSTLAMINECTOMY SYNDROME OF CERVICAL REGION: Primary | ICD-10-CM

## 2020-07-29 PROCEDURE — 99213 OFFICE O/P EST LOW 20 MIN: CPT | Mod: 95,,, | Performed by: NURSE PRACTITIONER

## 2020-07-29 PROCEDURE — 99213 PR OFFICE/OUTPT VISIT, EST, LEVL III, 20-29 MIN: ICD-10-PCS | Mod: 95,,, | Performed by: NURSE PRACTITIONER

## 2020-07-29 NOTE — H&P (VIEW-ONLY)
Established Patient - Audio Only Telehealth Visit     The patient location is: Home  The chief complaint leading to consultation is: Neck pain  Visit type: Virtual visit with audio only (telephone)  Total time spent with patient: 15 minutes       The reason for the audio only service rather than synchronous audio and video virtual visit was related to technical difficulties or patient preference/necessity.     Each patient to whom I provide medical services by telemedicine is:  (1) informed of the relationship between the physician and patient and the respective role of any other health care provider with respect to management of the patient; and (2) notified that they may decline to receive medical services by telemedicine and may withdraw from such care at any time. Patient verbally consented to receive this service via voice-only telephone call.       HPI: Patient reports that approximately 2 months ago he was laying on his sofa internus head in heard a crack in his neck.  Since that time, he has had worsening neck pain with radiation into his arms.  Additionally, he states that about 1 month ago he fell in his shower and landed onto his right shoulder and neck.  He denies any new onset of weakness in his upper extremities.  He denies any bowel or bladder changes.  He denies any recent respiratory symptoms.  He continues to take gabapentin and states that he has been taking 900 mg daily which does cause some drowsiness but does give him some benefit.  He has not been evaluated since previously mentioned trauma and would like updated imaging.  His pain today is 10/10.     Assessment and plan:      I will order an updated cervical MRI and cervical x-rays with flexion and extension to rule out any fractures and check patient's previous surgical sites.    I will call the patient with the results.  Consider repeat epidural.    Can continue gabapentin 900 mg daily.  He does not need refills at this time.     Return to  clinic as needed I will call with imaging results.       This service was not originating from a related E/M service provided within the previous 7 days nor will  to an E/M service or procedure within the next 24 hours or my soonest available appointment.  Prevailing standard of care was able to be met in this audio-only visit.

## 2020-08-06 ENCOUNTER — HOSPITAL ENCOUNTER (OUTPATIENT)
Dept: RADIOLOGY | Facility: HOSPITAL | Age: 50
Discharge: HOME | End: 2020-08-06
Attending: NURSE PRACTITIONER
Payer: COMMERCIAL

## 2020-08-06 DIAGNOSIS — M54.12 CERVICAL RADICULOPATHY: ICD-10-CM

## 2020-08-06 PROCEDURE — 72141 MRI CERVICAL SPINE WITHOUT CONTRAST: ICD-10-PCS | Mod: 26,,, | Performed by: RADIOLOGY

## 2020-08-06 PROCEDURE — 72052 X-RAY EXAM NECK SPINE 6/>VWS: CPT | Mod: TC

## 2020-08-06 PROCEDURE — 72141 MRI NECK SPINE W/O DYE: CPT | Mod: 26,,, | Performed by: RADIOLOGY

## 2020-08-06 PROCEDURE — 72052 X-RAY EXAM NECK SPINE 6/>VWS: CPT | Mod: 26,,, | Performed by: RADIOLOGY

## 2020-08-06 PROCEDURE — 72141 MRI NECK SPINE W/O DYE: CPT | Mod: TC

## 2020-08-06 PROCEDURE — 72052 XR CERVICAL SPINE 5 VIEW WITH FLEX AND EXT: ICD-10-PCS | Mod: 26,,, | Performed by: RADIOLOGY

## 2020-08-11 DIAGNOSIS — M54.12 CERVICAL RADICULOPATHY: Primary | ICD-10-CM

## 2020-08-20 ENCOUNTER — HOSPITAL ENCOUNTER (OUTPATIENT)
Facility: OTHER | Age: 50
Discharge: HOME | End: 2020-08-20
Attending: ANESTHESIOLOGY | Admitting: ANESTHESIOLOGY
Payer: COMMERCIAL

## 2020-08-20 VITALS
WEIGHT: 202 LBS | BODY MASS INDEX: 29.92 KG/M2 | HEART RATE: 71 BPM | SYSTOLIC BLOOD PRESSURE: 135 MMHG | OXYGEN SATURATION: 98 % | DIASTOLIC BLOOD PRESSURE: 82 MMHG | HEIGHT: 69 IN | RESPIRATION RATE: 18 BRPM | TEMPERATURE: 99 F

## 2020-08-20 DIAGNOSIS — M54.12 CERVICAL RADICULOPATHY: Primary | ICD-10-CM

## 2020-08-20 DIAGNOSIS — G89.29 CHRONIC PAIN: ICD-10-CM

## 2020-08-20 PROCEDURE — 62321 NJX INTERLAMINAR CRV/THRC: CPT | Mod: ,,, | Performed by: ANESTHESIOLOGY

## 2020-08-20 PROCEDURE — 25000003 PHARM REV CODE 250: Performed by: STUDENT IN AN ORGANIZED HEALTH CARE EDUCATION/TRAINING PROGRAM

## 2020-08-20 PROCEDURE — 63600175 PHARM REV CODE 636 W HCPCS: Performed by: ANESTHESIOLOGY

## 2020-08-20 PROCEDURE — 62321 PR INJ CERV/THORAC, W/GUIDANCE: ICD-10-PCS | Mod: ,,, | Performed by: ANESTHESIOLOGY

## 2020-08-20 PROCEDURE — 25500020 PHARM REV CODE 255: Performed by: ANESTHESIOLOGY

## 2020-08-20 PROCEDURE — A4216 STERILE WATER/SALINE, 10 ML: HCPCS | Performed by: ANESTHESIOLOGY

## 2020-08-20 PROCEDURE — 25000003 PHARM REV CODE 250: Performed by: ANESTHESIOLOGY

## 2020-08-20 PROCEDURE — 62321 NJX INTERLAMINAR CRV/THRC: CPT | Performed by: ANESTHESIOLOGY

## 2020-08-20 RX ORDER — MIDAZOLAM HYDROCHLORIDE 1 MG/ML
INJECTION INTRAMUSCULAR; INTRAVENOUS
Status: DISCONTINUED | OUTPATIENT
Start: 2020-08-20 | End: 2020-08-20 | Stop reason: HOSPADM

## 2020-08-20 RX ORDER — DEXAMETHASONE SODIUM PHOSPHATE 10 MG/ML
INJECTION INTRAMUSCULAR; INTRAVENOUS
Status: DISCONTINUED | OUTPATIENT
Start: 2020-08-20 | End: 2020-08-20 | Stop reason: HOSPADM

## 2020-08-20 RX ORDER — FENTANYL CITRATE 50 UG/ML
INJECTION, SOLUTION INTRAMUSCULAR; INTRAVENOUS
Status: DISCONTINUED | OUTPATIENT
Start: 2020-08-20 | End: 2020-08-20 | Stop reason: HOSPADM

## 2020-08-20 RX ORDER — SODIUM CHLORIDE 9 MG/ML
500 INJECTION, SOLUTION INTRAVENOUS CONTINUOUS
Status: DISCONTINUED | OUTPATIENT
Start: 2020-08-20 | End: 2020-08-20 | Stop reason: HOSPADM

## 2020-08-20 RX ORDER — LIDOCAINE HYDROCHLORIDE 10 MG/ML
INJECTION INFILTRATION; PERINEURAL
Status: DISCONTINUED | OUTPATIENT
Start: 2020-08-20 | End: 2020-08-20 | Stop reason: HOSPADM

## 2020-08-20 RX ORDER — BUPIVACAINE HYDROCHLORIDE 2.5 MG/ML
INJECTION, SOLUTION EPIDURAL; INFILTRATION; INTRACAUDAL
Status: DISCONTINUED | OUTPATIENT
Start: 2020-08-20 | End: 2020-08-20 | Stop reason: HOSPADM

## 2020-08-20 RX ORDER — SODIUM CHLORIDE 9 MG/ML
INJECTION, SOLUTION INTRAMUSCULAR; INTRAVENOUS; SUBCUTANEOUS
Status: DISCONTINUED | OUTPATIENT
Start: 2020-08-20 | End: 2020-08-20 | Stop reason: HOSPADM

## 2020-08-20 RX ADMIN — SODIUM CHLORIDE 500 ML: 0.9 INJECTION, SOLUTION INTRAVENOUS at 02:08

## 2020-08-20 NOTE — INTERVAL H&P NOTE
The patient has been examined and the H&P has been reviewed:    Since his visit on 07/29/2020 he has had a MRI of the cervical spine which showed no new changes from his previous MRI:    EXAMINATION:  MRI CERVICAL SPINE WITHOUT CONTRAST     CLINICAL HISTORY:  neck pain, h/o surgery;.  Radiculopathy, cervical region     TECHNIQUE:  Multiplanar, multisequence MR images of the cervical spine were acquired without the administration of contrast.     COMPARISON:  Radiograph 08/06/2020, MRI 08/16/2019.     FINDINGS:  Postoperative changes of C6-C7 ACDF and decompressive laminectomies.  Cervical spine alignment demonstrates grade 1 retrolisthesis of C3 on C4 and C4 on C5.  Vertebral body heights are well maintained without evidence for fracture.  There is ankylosis of the right C6-C7 facet joints.  No marrow signal abnormality to suggest an infiltrative process.     There is persistent fusiform dilatation of the central canal of the spinal cord extending from C5 through C7 measuring up to 3 mm in caliber and 4.8 cm in craniocaudal dimension, similar when compared to MRI dated 08/16/2019.  Remaining spinal cord demonstrates normal contour and signal intensity.  Cerebellar tonsils are in their expected location.     Vertebral artery flow voids are present.  Prevertebral soft tissues are normal.  No cervical lymphadenopathy.  Visualized parotid and submandibular glands are within normal limits.  There are multiple subcentimeter T2 hyperintense nodules within the right thyroid lobe.  Paraspinal musculature demonstrates normal bulk and signal intensity.     C2-C3: No spinal canal stenosis or neural foraminal narrowing.     C3-C4: Grade 1 retrolisthesis, moderate central/right paracentral disc osteophyte complex and moderate bilateral uncovertebral joint spurring.  Findings contribute to moderate spinal canal stenosis and severe right and moderate left neural foraminal narrowing.     C4-C5: Small broad-based right paracentral  disc osteophyte complex and mild right uncovertebral joint spurring.  Findings contribute to mild bilateral neural foraminal narrowing.  No spinal canal stenosis.     C5-C6: Moderate posterior disc osteophyte complex partially effaces the ventral thecal sac.  Mild bilateral facet hypertrophy.  Findings contribute to mild spinal canal stenosis.  No neural foraminal narrowing.     C6-C7: Left facet hypertrophy.  No spinal canal stenosis or neural foraminal narrowing.     C7-T1: Bilateral facet hypertrophy and left uncovertebral joint spurring.  Findings contribute to mild left neural foraminal narrowing.  No spinal canal stenosis.     Impression:     1. Postoperative changes of C6-C7 ACDF and decompressive laminectomy.  2. Persistent syrinx extending from C5 through C7, similar when compared to the previous MRI.  No associated cord edema or expansion.  3. Multilevel cervical degenerative changes most pronounced at C3-C4 noting moderate spinal canal stenosis and moderate to severe neural foraminal narrowing, similar when compared to the previous exam.        Electronically signed by: Javier Ramon MD  Date:                                            08/06/2020  Time:                                           19:13            Anesthesia/Surgery risks, benefits and alternative options discussed and understood by patient/family.          There are no hospital problems to display for this patient.

## 2020-08-20 NOTE — PLAN OF CARE
PATIENT TOLERATED PROCEDURE WELL. PT COMPLAINS OF  1/10 PAIN. ASSISTED PATIENT UP FOR FIRST TIME. STEADY ON FEET AND DISCHARGE INSTRUCTIONS GIVEN.

## 2020-08-20 NOTE — DISCHARGE SUMMARY
Discharge Note  Short Stay      SUMMARY     Admit Date: 8/20/2020    Attending Physician: Elba Juarez      Discharge Physician: Elba Juarez      Discharge Date: 8/20/2020 3:19 PM    Procedure(s) (LRB):  INJECTION, STEROID, EPIDURAL T1-2 IL BATSHEVA (N/A)    Final Diagnosis: Thoracic radiculopathy [M54.14]    Disposition: Home or self care    Patient Instructions:   Current Discharge Medication List      CONTINUE these medications which have NOT CHANGED    Details   gabapentin (NEURONTIN) 300 MG capsule TAKE 1 CAPSULE BY MOUTH 5 TIMES DAILY. TAKE 2 IN THE AFTERNOON AND 3 AT BEDTIME  Qty: 150 capsule, Refills: 2    Associated Diagnoses: Cervical radiculitis; Cervical spondylosis without myelopathy; Degeneration of cervical intervertebral disc; Neck pain      ibuprofen (ADVIL,MOTRIN) 200 MG tablet Take 200 mg by mouth every 6 (six) hours as needed for Pain.                 Discharge Diagnosis: Thoracic radiculopathy [M54.14]  Condition on Discharge: Stable with no complications to procedure   Diet on Discharge: Same as before.  Activity: as per instruction sheet.  Discharge to: Home with a responsible adult.  Follow up: 2-4 weeks       Please call my office or pager at [phone] if experienced any weakness or loss of sensation, fever > 101.5, pain uncontrolled with oral medications, persistent nausea/vomiting/or diarrhea, redness or drainage from the incisions, or any other worrisome concerns. If physician on call was not reached or could not communicate with our office for any reason please go to the nearest emergency department

## 2020-08-20 NOTE — OP NOTE
Thoracic Interlaminar Epidural Steroid Injection under Fluoroscopic Guidance.   Time-out taken to identify patient and procedure prior to starting the procedure.     Date of Procedure: 08/20/2020    PROCEDURE: Cervical Interlaminar epidural steroid injection T1/T2 under fluoroscopic guidance.     Pre-Op diagnosis: Cervical radiculopathy/Thoracic radiculopathy [M54.14]    Post-Op diagnosis: Cervical radiculopathy/Thoracic radiculopathy [M54.14]    PHYSICIAN: CHRIS MATA     ASSISTANTS: Rebecca Grider DO    MEDICATIONS INJECTED: Preservative-free Decadron 10 mg with 1mL of sterile 0.25%Bupivicaine and 3ml of preservative free normal saline.     LOCAL ANESTHETIC INJECTED: Xylocaine 1% 3mL    ESTIMATED BLOOD LOSS: none.     COMPLICATIONS: none.     TECHNIQUE: With the patient laying in a prone position, the area was prepped and draped in the usual sterile fashion using ChloraPrep and a fenestrated drape. Local anesthetic was given using a 27-gauge needle by raising a wheal and going down to the hub of the needle over the T1/T2 interlaminar space.  The interlaminar space was then approached with a 3.5 inch 18-gauge Touhy needle was introduced under fluoroscopic guidance in the AP and Lateral view. Once the Ligamentum flavum was encountered loss of resistance to saline was used to enter the epidural space. With positive loss of resistance and negative CSF or Blood, 2mL contrast dye Omnipaque (300mg/ml) was injected to confirm placement and there was no vascular runoff. The medication was then injected slowly. The patient tolerated the procedure well.     Conscious sedation provided by M.D    The patient was monitored with continuous pulse oximetry, EKG, and intermittent blood pressure monitors.  The patient was hemodynamically stable throughout the entire process was responsive to voice, and breathing spontaneously.  Supplemental O2 was provided at 2L/min via nasal cannula.  Patient was comfortable for the duration of  the procedure. (See nurse documentation and case log for sedation time)    There was a total of 2mg IV Midazolam and 100mcg Fentanyl titrated for the procedure      The patient was monitored after the procedure.   They were given post-procedure and discharge instructions to follow at home.  The patient was discharged in a stable condition.

## 2020-08-20 NOTE — DISCHARGE INSTRUCTIONS

## 2020-08-28 ENCOUNTER — CLINICAL SUPPORT (OUTPATIENT)
Dept: REHABILITATION | Facility: HOSPITAL | Age: 50
End: 2020-08-28
Payer: COMMERCIAL

## 2020-08-28 DIAGNOSIS — R29.898 DECREASED RANGE OF MOTION OF NECK: ICD-10-CM

## 2020-08-28 DIAGNOSIS — M54.2 NECK PAIN: ICD-10-CM

## 2020-08-28 DIAGNOSIS — R29.3 POSTURE IMBALANCE: ICD-10-CM

## 2020-08-28 DIAGNOSIS — M54.12 CERVICAL RADICULOPATHY: ICD-10-CM

## 2020-08-28 PROCEDURE — 97161 PT EVAL LOW COMPLEX 20 MIN: CPT | Mod: PN

## 2020-08-28 NOTE — PLAN OF CARE
"[facility]  Physical Therapy Initial Evaluation    Name: Heraclio Lam  Clinic Number: [id number]    Therapy Diagnosis:   Encounter Diagnoses   Name Primary?    Cervical radiculopathy     Decreased range of motion of neck     Posture imbalance     Neck pain      Physician: Oly Grissom,*    Physician Orders: PT Eval and Treat   Medical Diagnosis from Referral:   M54.12 (ICD-10-CM) - Cervical radiculopathy  Evaluation Date: 8/28/2020  Plan of Care Expiration: 11/28/20    Authorization Period Expiration: 12/31/20  Visit # / Visits authorized: 1/ 40      Time In: 1000  Time Out: 1045    Total Billable Time: 45 minutes    Precautions: standard    Subjective   Date of onset:  Several years    History of current condition:   Heraclio  is a 50 year old right handed male presenting with c/o neck pain with intermittent bilaterally upper extremity radiculopathy.  He states his neck pain is resulting from a work injury while picking up something heavy in 1998. He states he "retired" in September of 2019. He states he is no longer working.  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 He states he had a CSF shunt placement in 1999 and a cervical disc replacement in 2006.  He states he heard a noise in his neck when getting up from lying on a sofa in May. His goal is to get some strength an have less pain and symptoms into his arms.  He states he is losing sensation in hands and feet and may have to give up driving.      Medical History:   Past Medical History:   Diagnosis Date    Arthritis        Surgical History:   Heraclio Lam  has a past surgical history that includes CSF shunt; Joint replacement (2006); Epidural steroid injection (N/A, 12/4/2018); Epidural steroid injection (N/A, 1/8/2019); Spine surgery; and Epidural steroid injection (N/A, 8/20/2020).    Medications:   Heraclio has a current medication list which includes the following " "prescription(s): gabapentin and ibuprofen.    Allergies:   Review of patient's allergies indicates:  No Known Allergies     Imagin. Postoperative changes of C6-C7 ACDF and decompressive laminectomy.  2. Persistent syrinx extending from C5 through C7, similar when compared to the previous MRI.  No associated cord edema or expansion.  3. Multilevel cervical degenerative changes most pronounced at C3-C4 noting moderate spinal canal stenosis and moderate to severe neural foraminal narrowing, similar when compared to the previous exam.    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      Objective     Observation: pt stands with rounded shoulders, forward head posture.   Palpation: mild sub-occipital and upper trap tenderness globally      Range of Motion/Strength:      Cervical AROM: Degrees   Flexion 40   Extension 40   Right side bending 20   Left side bending 20   Right rotation 60   Left rotation 62   Cervical quadrant reveals:     AROM: Bilateral UE: Grossly WFL  MMT:  Right UE: 5-/5   Left UE: 5-/5    Mobility: C/S p/a grade 1+  : position 2:  R:5-/5 L:5-/5      Bed Mobility:Independent  Transfers: Independent    Special Tests:   Spurlings: negative    CMS Impairment/Limitation/Restriction for FOTO Neck Survey  Status Limitation G-Code CMS Severity Modifier  Intake 49% 51% Current Status CK - At least 40 percent but less than 60 percent  Predicted 57% 43% Goal Status+ CK - At least 40 percent but less than 60 percent    TREATMENT     Treatment Time In: 1030  Treatment Time Out: 1045    Total Treatment " time separate from Evaluation: 15 minutes    Heraclio received therapeutic exercises to develop strength, endurance, ROM, flexibility, posture and core stabilization for 10 minutes including:   -c/s retraction 2 x 10  -scapular retraction 2 x 10  -levator stretch 20 sec x 4  -pec stretch 20 sec x 4    Education provided:   - HEP compliance    Written Home Exercises Provided: yes.    Exercises were reviewed and Heraclio was able to demonstrate them prior to the end of the session.  Heraclio demonstrated good  understanding of the education provided.     See EMR under Patient Instructions for exercises provided 8/28/2020.    Assessment     Pt presents with signs and symptoms consistent with referring diagnosis. Evaluation has determined a decrease in functional status and subjective and objective deficits that can be addressed by physical therapy intervention. Pt demonstrates pain limiting functional activities. Decreased flexibility and strength limiting normal movement patterns. Decreased segmental motion. Decreased postural strength and awareness. Positive special testing. Decreased participation in functional and recreational activities. Subjective and objective measures are addressed by goals in the plan of care.  Patient/family are involved in the development of these goals. Patient/family are educated about current injury and treatment.       Plan of care was dicussed with patient. Pt will benefit from skilled outpatient Physical Therapy to address the deficits stated above and in the chart below, provide pt/family education, and to maximize pt's level of independence. Pt's spiritual, cultural and educational needs considered and patient is agreeable to the plan of care and goals as stated below:     Pt prognosis is Good.  Anticipated Barriers for therapy: none    Medical Necessity is demonstrated by the following  History  Co-morbidities and personal factors that may impact the plan of care Co-morbidities:   Spinal sx,  shunt, OA    Personal Factors:   no deficits     low   Examination  Body Structures and Functions, activity limitations and participation restrictions that may impact the plan of care Body Regions:   neck  lower extremities    Body Systems:    gross symmetry  ROM  strength    Participation Restrictions:   Housework, attends to dog    Activity limitations:   Learning and applying knowledge  no deficits    General Tasks and Commands  no deficits    Communication  no deficits    Mobility  lifting and carrying objects    Self care  no deficits    Domestic Life  doing house work (cleaning house, washing dishes, laundry)    Interactions/Relationships  no deficits    Life Areas  no deficits    Community and Social Life  community life         low   Clinical Presentation stable and uncomplicated low   Decision Making/ Complexity Score: low     Goals:    Short Term Goals (4 Weeks):     1.Pt to increase strength by a 1/2 grade of muscles test to allow for improvement in functional activities such as performing chores.  2.Pt to improve range of motion by 25% to allow for improved functional mobility to allow for improvement in IADLs.   3.Pt to report compliance with HEP and demonstrate proper exercise technique to PT to show competence with self management of condition.  4.Decrease pain by 25% during functional activities.    Long Term Goals (12 Weeks):     1. Increase ROM to allow improved joint biomechanics during functional activities.   2.Increase trunk and upper extremity strength to within normal limits during functional activities.   3. Independent with home exercise program.   4. Full return to functional activities with manageable complaints.  5. Patient to demonstrate improved posture and body mechanics.  6. Decrease pain by 75% during functional activities.    Plan     Plan of care Certification: 8/28/2020 to 11/28/20.    Recommended Treatment Plan: 2 times per week for 12 weeks with treatments to consist of:   Neuromuscular and postural re-education,  training, therapeutic exercise, therapeutic activities,balance training, gait training, manual therapy, soft tissue mobilization, ROM exercises, Cardiovascular,  Postural stabilization, manual traction, spinal mobilization, moist heat, cryotherapy, electrical stimulation, ultrasound, home exercise education and planning.    Damien Romero, PT

## 2020-09-03 ENCOUNTER — OFFICE VISIT (OUTPATIENT)
Dept: PAIN MEDICINE | Facility: CLINIC | Age: 50
End: 2020-09-03
Payer: COMMERCIAL

## 2020-09-03 DIAGNOSIS — M54.12 CERVICAL RADICULOPATHY: Primary | ICD-10-CM

## 2020-09-03 DIAGNOSIS — G89.29 OTHER CHRONIC PAIN: ICD-10-CM

## 2020-09-03 PROCEDURE — 99213 PR OFFICE/OUTPT VISIT, EST, LEVL III, 20-29 MIN: ICD-10-PCS | Mod: 95,,, | Performed by: NURSE PRACTITIONER

## 2020-09-03 PROCEDURE — 99213 OFFICE O/P EST LOW 20 MIN: CPT | Mod: 95,,, | Performed by: NURSE PRACTITIONER

## 2020-09-03 NOTE — PROGRESS NOTES
Established Patient - Audio Only Telehealth Visit     The patient location is: Home  The chief complaint leading to consultation is: Neck pain  Visit type: Virtual visit with audio only (telephone)  Total time spent with patient: 7 minutes       The reason for the audio only service rather than synchronous audio and video virtual visit was related to technical difficulties or patient preference/necessity.     Each patient to whom I provide medical services by telemedicine is:  (1) informed of the relationship between the physician and patient and the respective role of any other health care provider with respect to management of the patient; and (2) notified that they may decline to receive medical services by telemedicine and may withdraw from such care at any time. Patient verbally consented to receive this service via voice-only telephone call.       HPI: The patient has an audio visit today for follow up of neck pain.  He is s/p T1-2 IL BATSHEVA on 8/20/20 with 75% relief.  He still has some soreness from the procedure.  He still has some neck pain with intermittent burning in the arms.  He is able to move his head and neck better since the procedure.  He denies any recent respiratory symptoms.  He had updated imaging since previous visit as well.  His pain today is 4/10.     Narrative & Impression     EXAMINATION:  MRI CERVICAL SPINE WITHOUT CONTRAST     CLINICAL HISTORY:  neck pain, h/o surgery;.  Radiculopathy, cervical region     TECHNIQUE:  Multiplanar, multisequence MR images of the cervical spine were acquired without the administration of contrast.     COMPARISON:  Radiograph 08/06/2020, MRI 08/16/2019.     FINDINGS:  Postoperative changes of C6-C7 ACDF and decompressive laminectomies.  Cervical spine alignment demonstrates grade 1 retrolisthesis of C3 on C4 and C4 on C5.  Vertebral body heights are well maintained without evidence for fracture.  There is ankylosis of the right C6-C7 facet joints.  No marrow  signal abnormality to suggest an infiltrative process.     There is persistent fusiform dilatation of the central canal of the spinal cord extending from C5 through C7 measuring up to 3 mm in caliber and 4.8 cm in craniocaudal dimension, similar when compared to MRI dated 08/16/2019.  Remaining spinal cord demonstrates normal contour and signal intensity.  Cerebellar tonsils are in their expected location.     Vertebral artery flow voids are present.  Prevertebral soft tissues are normal.  No cervical lymphadenopathy.  Visualized parotid and submandibular glands are within normal limits.  There are multiple subcentimeter T2 hyperintense nodules within the right thyroid lobe.  Paraspinal musculature demonstrates normal bulk and signal intensity.     C2-C3: No spinal canal stenosis or neural foraminal narrowing.     C3-C4: Grade 1 retrolisthesis, moderate central/right paracentral disc osteophyte complex and moderate bilateral uncovertebral joint spurring.  Findings contribute to moderate spinal canal stenosis and severe right and moderate left neural foraminal narrowing.     C4-C5: Small broad-based right paracentral disc osteophyte complex and mild right uncovertebral joint spurring.  Findings contribute to mild bilateral neural foraminal narrowing.  No spinal canal stenosis.     C5-C6: Moderate posterior disc osteophyte complex partially effaces the ventral thecal sac.  Mild bilateral facet hypertrophy.  Findings contribute to mild spinal canal stenosis.  No neural foraminal narrowing.     C6-C7: Left facet hypertrophy.  No spinal canal stenosis or neural foraminal narrowing.     C7-T1: Bilateral facet hypertrophy and left uncovertebral joint spurring.  Findings contribute to mild left neural foraminal narrowing.  No spinal canal stenosis.     Impression:     1. Postoperative changes of C6-C7 ACDF and decompressive laminectomy.  2. Persistent syrinx extending from C5 through C7, similar when compared to the  previous MRI.  No associated cord edema or expansion.  3. Multilevel cervical degenerative changes most pronounced at C3-C4 noting moderate spinal canal stenosis and moderate to severe neural foraminal narrowing, similar when compared to the previous exam.          Assessment and plan:      Recent cervical MRI and cervical x-rays reviewed.    He had benefit with recent BATSHEVA for neck pain.  Will continue to monitor progress.    Can continue gabapentin 900 mg daily.       Return to clinic as needed.       This service was not originating from a related E/M service provided within the previous 7 days nor will  to an E/M service or procedure within the next 24 hours or my soonest available appointment.  Prevailing standard of care was able to be met in this audio-only visit.

## 2020-09-14 ENCOUNTER — CLINICAL SUPPORT (OUTPATIENT)
Dept: REHABILITATION | Facility: HOSPITAL | Age: 50
End: 2020-09-14
Payer: COMMERCIAL

## 2020-09-14 DIAGNOSIS — M54.2 NECK PAIN: ICD-10-CM

## 2020-09-14 DIAGNOSIS — R29.3 POSTURE IMBALANCE: ICD-10-CM

## 2020-09-14 DIAGNOSIS — R29.898 DECREASED RANGE OF MOTION OF NECK: Primary | ICD-10-CM

## 2020-09-14 PROCEDURE — 97110 THERAPEUTIC EXERCISES: CPT | Mod: PN,CQ

## 2020-09-14 NOTE — PROGRESS NOTES
Physical Therapy Daily Treatment Note     Name: Heraclio Lam  Clinic Number: [id number]    Therapy Diagnosis:   Encounter Diagnoses   Name Primary?    Decreased range of motion of neck Yes    Posture imbalance     Neck pain      Physician: Oly Grissom,Danny    Visit Date: 9/14/2020  Physician Orders: PT Eval and Treat   Medical Diagnosis from Referral:   M54.12 (ICD-10-CM) - Cervical radiculopathy  Evaluation Date: 8/28/2020  Plan of Care Expiration: 11/28/20     Authorization Period Expiration: 12/31/20  Visit # / Visits authorized: 2/ 40        Time In: 1010  Time Out: 1105     Total Billable Time: 45 minutes     Precautions: standard      Subjective     Pt reports: he is feeling much better. Feels pressure in neck near where the shunt was placed.  He was compliant with home exercise program.  Response to previous treatment: good  Functional change: ongoing    Pain: 3/10  Location:  Cervical spine.     Objective     Heraclio received therapeutic exercises to develop strength, endurance, ROM, flexibility, posture and core stabilization for 10 minutes including:     +UBE 3/3  +Supine chin tucks 2x10  -scapular retraction 2 x 10  -levator stretch 20 sec x 4  -pec stretch 20 sec x 4  +Supine wand flexion 2x10  +Supine horizontal ABD YTB 2x10  +Straight arm pull down RTB 2x10  +Rows RTB 2x10        Heraclio received the following manual therapy techniques:  were applied to the:  for  minutes, including:      Heraclio participated in neuromuscular re-education activities to improve:  for  minutes. The following activities were included:        Heraclio received the following direct contact modalities after being cleared for contraindications:     Heraclio received the following supervised modalities after being cleared for contradictions:     Heraclio received hot pack for  minutes to .    Heraclio received cold pack for 10 minutes to cervical spine.      Home Exercises Provided and Patient Education Provided      Education provided:   - Cont HEP    Written Home Exercises Provided: yes.  Exercises were reviewed and Heraclio was able to demonstrate them prior to the end of the session.  Heraclio demonstrated fair  understanding of the education provided.     See EMR under Patient Instructions for exercises provided 9/14/2020.    Assessment     Pt tolerated session well today. Pt states he is feeling much better. Pt requiring VCs and demonstration for most exercises for correct form. Pt frequently shrugs during exercises, VCs to relax shoulders. Pt presents with weakness in BUE, becoming fatigued with all strengthening exercises, fasciculations noted.     Heraclio is progressing well towards his goals.   Pt prognosis is Good.     Pt will continue to benefit from skilled outpatient physical therapy to address the deficits listed in the problem list box on initial evaluation, provide pt/family education and to maximize pt's level of independence in the home and community environment.     Pt's spiritual, cultural and educational needs considered and pt agreeable to plan of care and goals.     Anticipated barriers to physical therapy: none    Goals:     Short Term Goals (4 Weeks):      1.Pt to increase strength by a 1/2 grade of muscles test to allow for improvement in functional activities such as performing chores.  2.Pt to improve range of motion by 25% to allow for improved functional mobility to allow for improvement in IADLs.   3.Pt to report compliance with HEP and demonstrate proper exercise technique to PT to show competence with self management of condition.  4.Decrease pain by 25% during functional activities.     Long Term Goals (12 Weeks):      1. Increase ROM to allow improved joint biomechanics during functional activities.   2.Increase trunk and upper extremity strength to within normal limits during functional activities.   3. Independent with home exercise program.   4. Full return to functional activities with  manageable complaints.  5. Patient to demonstrate improved posture and body mechanics.  6. Decrease pain by 75% during functional activities.      Plan     Neuromuscular and postural re-education,  training, therapeutic exercise, therapeutic activities,balance training, gait training, manual therapy, soft tissue mobilization, ROM exercises, Cardiovascular, Postural stabilization, manual traction, spinal mobilization, moist heat, cryotherapy, electrical stimulation, ultrasound, home exercise education and planning       Tim Whittaker, PTA

## 2020-09-21 ENCOUNTER — CLINICAL SUPPORT (OUTPATIENT)
Dept: REHABILITATION | Facility: HOSPITAL | Age: 50
End: 2020-09-21
Payer: COMMERCIAL

## 2020-09-21 DIAGNOSIS — M54.2 NECK PAIN: ICD-10-CM

## 2020-09-21 DIAGNOSIS — R29.3 POSTURE IMBALANCE: ICD-10-CM

## 2020-09-21 DIAGNOSIS — R29.898 DECREASED RANGE OF MOTION OF NECK: ICD-10-CM

## 2020-09-21 PROCEDURE — 97110 THERAPEUTIC EXERCISES: CPT | Mod: PN

## 2020-09-21 NOTE — PROGRESS NOTES
Physical Therapy Daily Treatment Note     Name: Heraclio Lam  Clinic Number: [id number]    Therapy Diagnosis:   Encounter Diagnoses   Name Primary?    Decreased range of motion of neck     Posture imbalance     Neck pain      Physician: Oly Grissom,*    Visit Date: 9/21/2020  Physician Orders: PT Eval and Treat   Medical Diagnosis from Referral:   M54.12 (ICD-10-CM) - Cervical radiculopathy  Evaluation Date: 8/28/2020  Plan of Care Expiration: 11/28/20     Authorization Period Expiration: 12/31/20  Visit # / Visits authorized: 3/ 40        Time In: 1000  Time Out: 1045     Total Billable Time: 45 minutes     Precautions: standard      Subjective     Pt reports: continued burning sensations.   He was compliant with home exercise program.  Response to previous treatment: good  Functional change: ongoing    Pain: 3/10  Location:  Cervical spine.     Objective     Heraclio received therapeutic exercises to develop strength, endurance, ROM, flexibility, posture and core stabilization for 45 minutes including:     +UBE x 4min  -pulley  +Supine chin tucks 2x10  -scapular retraction 2 x 10  -levator stretch 20 sec x 4  -pec stretch on 1/2 roll 20 sec x 4  -corner stretch 20 sec x 4  +Supine wand flexion 2x10  +Supine horizontal ABD YTB 2x10  +Straight arm pull down RTB 2x10  +Rows RTB 2x10  -seated thoracic extension with towel roll x 20      Heraclio received cold pack for 10 minutes to cervical spine.      Home Exercises Provided and Patient Education Provided     Education provided:   - Cont HEP    Written Home Exercises Provided: yes.  Exercises were reviewed and Heraclio was able to demonstrate them prior to the end of the session.  Heraclio demonstrated fair  understanding of the education provided.     See EMR under Patient Instructions for exercises provided 9/14/2020.    Assessment     Pt continues to ambulate with a slightly guarded gait. No c/o increased discomfort with prescribed activities. Requires  min cueing with postural correction with prescribed therex. Heraclio is progressing well towards his goals.     Pt prognosis is Good.     Pt will continue to benefit from skilled outpatient physical therapy to address the deficits listed in the problem list box on initial evaluation, provide pt/family education and to maximize pt's level of independence in the home and community environment.     Pt's spiritual, cultural and educational needs considered and pt agreeable to plan of care and goals.     Anticipated barriers to physical therapy: none    Goals:     Short Term Goals (4 Weeks):      1.Pt to increase strength by a 1/2 grade of muscles test to allow for improvement in functional activities such as performing chores.  2.Pt to improve range of motion by 25% to allow for improved functional mobility to allow for improvement in IADLs.   3.Pt to report compliance with HEP and demonstrate proper exercise technique to PT to show competence with self management of condition.  4.Decrease pain by 25% during functional activities.     Long Term Goals (12 Weeks):      1. Increase ROM to allow improved joint biomechanics during functional activities.   2.Increase trunk and upper extremity strength to within normal limits during functional activities.   3. Independent with home exercise program.   4. Full return to functional activities with manageable complaints.  5. Patient to demonstrate improved posture and body mechanics.  6. Decrease pain by 75% during functional activities.      Plan     Neuromuscular and postural re-education,  training, therapeutic exercise, therapeutic activities,balance training, gait training, manual therapy, soft tissue mobilization, ROM exercises, Cardiovascular, Postural stabilization, manual traction, spinal mobilization, moist heat, cryotherapy, electrical stimulation, ultrasound, home exercise education and planning       Damien Romero, PT

## 2020-09-23 ENCOUNTER — PATIENT MESSAGE (OUTPATIENT)
Dept: RESEARCH | Facility: OTHER | Age: 50
End: 2020-09-23

## 2020-09-28 ENCOUNTER — CLINICAL SUPPORT (OUTPATIENT)
Dept: REHABILITATION | Facility: HOSPITAL | Age: 50
End: 2020-09-28
Payer: COMMERCIAL

## 2020-09-28 DIAGNOSIS — R29.898 DECREASED RANGE OF MOTION OF NECK: ICD-10-CM

## 2020-09-28 DIAGNOSIS — R29.3 POSTURE IMBALANCE: ICD-10-CM

## 2020-09-28 DIAGNOSIS — M54.2 NECK PAIN: ICD-10-CM

## 2020-09-28 PROCEDURE — 97110 THERAPEUTIC EXERCISES: CPT | Mod: PN

## 2020-09-28 NOTE — PROGRESS NOTES
Physical Therapy progress Note     Name: Heraclio Lam  Clinic Number: [id number]    Therapy Diagnosis:   Encounter Diagnoses   Name Primary?    Decreased range of motion of neck     Posture imbalance     Neck pain      Physician: Oly Grissom,*    Visit Date: 9/28/2020  Physician Orders: PT Eval and Treat   Medical Diagnosis from Referral:   M54.12 (ICD-10-CM) - Cervical radiculopathy  Evaluation Date: 8/28/2020  Plan of Care Expiration: 11/28/20     Authorization Period Expiration: 12/31/20  Visit # / Visits authorized: 4/ 40        Time In: 1045  Time Out: 1130     Total Billable Time: 45 minutes     Precautions: standard      Subjective     Pt reports: continued radicular symptoms   He was compliant with home exercise program.  Response to previous treatment: good  Functional change: ongoing    Pain: 3/10  Location:  Cervical spine.     Objective     Palpation: mild sub-occipital and upper trap tenderness globally        Range of Motion/Strength:       Cervical AROM: Degrees   Flexion 45   Extension 45   Right side bending 25   Left side bending 25   Right rotation 65   Left rotation 65        Heraclio received therapeutic exercises to develop strength, endurance, ROM, flexibility, posture and core stabilization for 45 minutes including:     +UBE x 4min  -pulley  +Supine chin tucks 2x10  -scapular retraction 2 x 10  -levator stretch 20 sec x 4  -pec stretch on 1/2 roll 20 sec x 4  -corner stretch 20 sec x 4  +Supine wand flexion 2x10  +Supine horizontal ABD YTB 2x10  +Straight arm pull down RTB 2x10  +Rows RTB 2x10  -seated thoracic extension with towel roll x 20      Heraclio received cold pack for 10 minutes to cervical spine.      Home Exercises Provided and Patient Education Provided     Education provided:   - Cont HEP    Written Home Exercises Provided: yes.  Exercises were reviewed and Heraclio was able to demonstrate them prior to the end of the session.  Heraclio demonstrated fair   understanding of the education provided.     See EMR under Patient Instructions for exercises provided 9/14/2020.    Assessment     No c/o increased discomfort with prescribed activities. Demonstrates improved active cervical ROM in all planes.  Requires min cueing with postural correction with prescribed therex. Heraclio is progressing well towards his goals.     Pt prognosis is Good.     Pt will continue to benefit from skilled outpatient physical therapy to address the deficits listed in the problem list box on initial evaluation, provide pt/family education and to maximize pt's level of independence in the home and community environment.     Pt's spiritual, cultural and educational needs considered and pt agreeable to plan of care and goals.     Anticipated barriers to physical therapy: none    Goals:     Short Term Goals (4 Weeks): Updated 9/29/20  MET     1.Pt to increase strength by a 1/2 grade of muscles test to allow for improvement in functional activities such as performing chores.  2.Pt to improve range of motion by 25% to allow for improved functional mobility to allow for improvement in IADLs.   3.Pt to report compliance with HEP and demonstrate proper exercise technique to PT to show competence with self management of condition.  4.Decrease pain by 25% during functional activities.     Long Term Goals (12 Weeks):  In progress     1. Increase ROM to allow improved joint biomechanics during functional activities.   2.Increase trunk and upper extremity strength to within normal limits during functional activities.   3. Independent with home exercise program.   4. Full return to functional activities with manageable complaints.  5. Patient to demonstrate improved posture and body mechanics.  6. Decrease pain by 75% during functional activities.      Plan     Neuromuscular and postural re-education,  training, therapeutic exercise, therapeutic activities,balance training, gait training, manual  therapy, soft tissue mobilization, ROM exercises, Cardiovascular, Postural stabilization, manual traction, spinal mobilization, moist heat, cryotherapy, electrical stimulation, ultrasound, home exercise education and planning       Damien Romero, PT

## 2020-10-15 ENCOUNTER — CLINICAL SUPPORT (OUTPATIENT)
Dept: REHABILITATION | Facility: HOSPITAL | Age: 50
End: 2020-10-15
Payer: COMMERCIAL

## 2020-10-15 DIAGNOSIS — R29.898 DECREASED RANGE OF MOTION OF NECK: Primary | ICD-10-CM

## 2020-10-15 DIAGNOSIS — R29.3 POSTURE IMBALANCE: ICD-10-CM

## 2020-10-15 DIAGNOSIS — M54.2 NECK PAIN: ICD-10-CM

## 2020-10-15 PROCEDURE — 97110 THERAPEUTIC EXERCISES: CPT | Mod: PN,CQ

## 2020-10-15 NOTE — PROGRESS NOTES
Physical Therapy progress Note     Name: Heraclio Lam  Clinic Number: [id number]    Therapy Diagnosis:   Encounter Diagnoses   Name Primary?    Decreased range of motion of neck Yes    Posture imbalance     Neck pain      Physician: Oly Grissom,*    Visit Date: 10/15/2020  Physician Orders: PT Eval and Treat   Medical Diagnosis from Referral:   M54.12 (ICD-10-CM) - Cervical radiculopathy  Evaluation Date: 8/28/2020  Plan of Care Expiration: 11/28/20     Authorization Period Expiration: 12/31/20  Visit # / Visits authorized: 5/ 40        Time In: 1400  Time Out: 1445     Total Billable Time: 45 minutes     Precautions: standard      Subjective     Pt reports: his arms and neck are not bothering him right now  He was compliant with home exercise program.  Response to previous treatment: good  Functional change: ongoing    Pain: 3/10  Location:  Cervical spine.     Objective       Heraclio received therapeutic exercises to develop strength, endurance, ROM, flexibility, posture and core stabilization for 45 minutes including:     +UBE x 4min  -pulley 2 mins  +Supine chin tucks 2x10  -scapular retraction 2 x 10  -levator stretch 20 sec x 4  -pec stretch on 1/2 roll 20 sec x 4  -corner stretch 20 sec x 4  Supine wand flexion 2x10 2#  Supine horizontal ABD RTB 2x10  Straight arm pull down RTB 2x10  Rows GTB 3x10  +D2 flexion YTB x10  +Serratus slides foam 2x10  -seated thoracic extension with towel roll x 20      Heraclio received cold pack for 10 minutes to cervical spine.      Home Exercises Provided and Patient Education Provided     Education provided:   - Cont HEP    Written Home Exercises Provided: yes.  Exercises were reviewed and Heraclio was able to demonstrate them prior to the end of the session.  Heraclio demonstrated fair  understanding of the education provided.     See EMR under Patient Instructions for exercises provided 9/14/2020.    Assessment     No c/o increased discomfort with prescribed  activities. Pt able to progress with periscapular strengthening with no adverse affects. Added serratus work and D2 flexion. Pt requiring Mod VCs and demonstration to perform properly.     Heraclio is progressing well towards his goals.     Pt prognosis is Good.     Pt will continue to benefit from skilled outpatient physical therapy to address the deficits listed in the problem list box on initial evaluation, provide pt/family education and to maximize pt's level of independence in the home and community environment.     Pt's spiritual, cultural and educational needs considered and pt agreeable to plan of care and goals.     Anticipated barriers to physical therapy: none    Goals:     Short Term Goals (4 Weeks): Updated 9/29/20  MET     1.Pt to increase strength by a 1/2 grade of muscles test to allow for improvement in functional activities such as performing chores.  2.Pt to improve range of motion by 25% to allow for improved functional mobility to allow for improvement in IADLs.   3.Pt to report compliance with HEP and demonstrate proper exercise technique to PT to show competence with self management of condition.  4.Decrease pain by 25% during functional activities.     Long Term Goals (12 Weeks):  In progress     1. Increase ROM to allow improved joint biomechanics during functional activities.   2.Increase trunk and upper extremity strength to within normal limits during functional activities.   3. Independent with home exercise program.   4. Full return to functional activities with manageable complaints.  5. Patient to demonstrate improved posture and body mechanics.  6. Decrease pain by 75% during functional activities.      Plan     Neuromuscular and postural re-education,  training, therapeutic exercise, therapeutic activities,balance training, gait training, manual therapy, soft tissue mobilization, ROM exercises, Cardiovascular, Postural stabilization, manual traction, spinal mobilization,  moist heat, cryotherapy, electrical stimulation, ultrasound, home exercise education and planning       Tim Whittaker, PTA

## 2020-10-22 ENCOUNTER — CLINICAL SUPPORT (OUTPATIENT)
Dept: REHABILITATION | Facility: HOSPITAL | Age: 50
End: 2020-10-22
Payer: COMMERCIAL

## 2020-10-22 DIAGNOSIS — R29.3 POSTURE IMBALANCE: ICD-10-CM

## 2020-10-22 DIAGNOSIS — R29.898 DECREASED RANGE OF MOTION OF NECK: Primary | ICD-10-CM

## 2020-10-22 DIAGNOSIS — M54.2 NECK PAIN: ICD-10-CM

## 2020-10-22 PROCEDURE — 97110 THERAPEUTIC EXERCISES: CPT | Mod: PN,CQ

## 2020-10-22 NOTE — PROGRESS NOTES
Physical Therapy progress Note     Name: Heraclio Lam  Clinic Number: [id number]    Therapy Diagnosis:   Encounter Diagnoses   Name Primary?    Decreased range of motion of neck Yes    Posture imbalance     Neck pain      Physician: Oly Grissom,Danny    Visit Date: 10/22/2020  Physician Orders: PT Eval and Treat   Medical Diagnosis from Referral:   M54.12 (ICD-10-CM) - Cervical radiculopathy  Evaluation Date: 8/28/2020  Plan of Care Expiration: 11/28/20     Authorization Period Expiration: 12/31/20  Visit # / Visits authorized: 6/ 40        Time In: 1400  Time Out: 1445     Total Billable Time: 45 minutes     Precautions: standard      Subjective     Pt reports: sometimes I feel like my head wants to explode from the pressure   He was compliant with home exercise program.  Response to previous treatment: good  Functional change: ongoing    Pain: 3/10  Location:  Cervical spine.     Objective       Heraclio received therapeutic exercises to develop strength, endurance, ROM, flexibility, posture and core stabilization for 45 minutes including:     -UBE x 4min  -pulley 2 mins  -Supine chin tucks 2x10  -levator stretch 20 sec x 4  -pec stretch on 1/2 roll x2 minutes  -corner stretch 20 sec x 4  +Alt Arms half foam x20  +Serratus punch 2# x20 half foam   +Cervical isometrics YTB  Supine wand flexion 2x10 2#  Supine horizontal ABD 2x10  Straight arm pull down black tubing 2x10  Rows black tubing  3x10  D2 flexion YTB x10  Serratus slides foam 2x10  -seated thoracic extension with towel roll x 20      Heraclio received cold pack for 10 minutes to cervical spine.      Home Exercises Provided and Patient Education Provided     Education provided:   - Cont HEP    Written Home Exercises Provided: yes.  Exercises were reviewed and Heraclio was able to demonstrate them prior to the end of the session.  Heraclio demonstrated fair  understanding of the education provided.     See EMR under Patient Instructions for exercises  provided 9/14/2020.    Assessment     No c/o increased discomfort with prescribed activities. Pt able to progress with periscapular strengthening with no adverse affects. Added serratus work and D2 flexion. Pt requiring Mod VCs and demonstration to perform properly.     Heraclio is progressing well towards his goals.     Pt prognosis is Good.     Pt will continue to benefit from skilled outpatient physical therapy to address the deficits listed in the problem list box on initial evaluation, provide pt/family education and to maximize pt's level of independence in the home and community environment.     Pt's spiritual, cultural and educational needs considered and pt agreeable to plan of care and goals.     Anticipated barriers to physical therapy: none    Goals:     Short Term Goals (4 Weeks): Updated 9/29/20  MET     1.Pt to increase strength by a 1/2 grade of muscles test to allow for improvement in functional activities such as performing chores.  2.Pt to improve range of motion by 25% to allow for improved functional mobility to allow for improvement in IADLs.   3.Pt to report compliance with HEP and demonstrate proper exercise technique to PT to show competence with self management of condition.  4.Decrease pain by 25% during functional activities.     Long Term Goals (12 Weeks):  In progress     1. Increase ROM to allow improved joint biomechanics during functional activities.   2.Increase trunk and upper extremity strength to within normal limits during functional activities.   3. Independent with home exercise program.   4. Full return to functional activities with manageable complaints.  5. Patient to demonstrate improved posture and body mechanics.  6. Decrease pain by 75% during functional activities.      Plan     Neuromuscular and postural re-education,  training, therapeutic exercise, therapeutic activities,balance training, gait training, manual therapy, soft tissue mobilization, ROM  exercises, Cardiovascular, Postural stabilization, manual traction, spinal mobilization, moist heat, cryotherapy, electrical stimulation, ultrasound, home exercise education and planning       Tim Whittaker, PTA

## 2020-11-13 ENCOUNTER — CLINICAL SUPPORT (OUTPATIENT)
Dept: REHABILITATION | Facility: HOSPITAL | Age: 50
End: 2020-11-13
Payer: COMMERCIAL

## 2020-11-13 DIAGNOSIS — R29.898 DECREASED RANGE OF MOTION OF NECK: ICD-10-CM

## 2020-11-13 DIAGNOSIS — M54.2 NECK PAIN: ICD-10-CM

## 2020-11-13 DIAGNOSIS — R29.3 POSTURE IMBALANCE: ICD-10-CM

## 2020-11-13 PROCEDURE — 97110 THERAPEUTIC EXERCISES: CPT | Mod: PN

## 2020-11-13 NOTE — PROGRESS NOTES
Physical Therapy progress Note     Name: Heraclio Lam  Clinic Number: [id number]    Therapy Diagnosis:   Encounter Diagnoses   Name Primary?    Decreased range of motion of neck     Posture imbalance     Neck pain      Physician: Oly Grissom,Danny    Visit Date: 11/13/2020  Physician Orders: PT Eval and Treat   Medical Diagnosis from Referral:   M54.12 (ICD-10-CM) - Cervical radiculopathy  Evaluation Date: 8/28/2020  Plan of Care Expiration: 11/28/20     Authorization Period Expiration: 12/31/20  Visit # / Visits authorized: 7/ 40        Time In: 1245  Time Out: 1330     Total Billable Time: 45 minutes     Precautions: standard      Subjective     Pt reports: the neck continues to be sore.  He returns after several weeks due to power outages.   He was compliant with home exercise program.  Response to previous treatment: good  Functional change: ongoing    Pain: 3/10  Location:  Cervical spine.     Objective     Palpation: mild sub-occipital and upper trap tenderness globally        Range of Motion/Strength:       Cervical AROM: Degrees   Flexion 48   Extension 48   Right side bending 27   Left side bending 25   Right rotation 65   Left rotation 65           Heraclio received therapeutic exercises to develop strength, endurance, ROM, flexibility, posture and core stabilization for 45 minutes including:     -UBE x 6 min  -pulley 2 mins  -Supine chin tucks 2x10  -levator stretch 20 sec x 4  -pec stretch on 1/2 roll x2 minutes  -corner stretch 20 sec x 4  +Alt Arms half foam x20  +Serratus punch 2# x20 half foam   +Cervical isometrics YTB  Supine wand flexion 2x10 2#  Supine horizontal ABD 2x10  Straight arm pull down black tubing 2x10  Rows black tubing  3x10  D2 flexion YTB x10  Serratus slides foam 2x10  -seated thoracic extension with towel roll x 20      Heraclio received cold pack for 10 minutes to cervical spine.      Home Exercises Provided and Patient Education Provided     Education provided:   -  Cont HEP    Written Home Exercises Provided: yes.  Exercises were reviewed and Heraclio was able to demonstrate them prior to the end of the session.  Heraclio demonstrated fair  understanding of the education provided.     See EMR under Patient Instructions for exercises provided 9/14/2020.    Assessment     No c/o increased discomfort with prescribed activities. Demonstrates improved active cervical ROM in all planes.  Requires min cueing with postural correction with prescribed therex. Heraclio is progressing well towards his goals.      Heraclio is progressing well towards his goals.     Pt prognosis is Good.     Pt will continue to benefit from skilled outpatient physical therapy to address the deficits listed in the problem list box on initial evaluation, provide pt/family education and to maximize pt's level of independence in the home and community environment.     Pt's spiritual, cultural and educational needs considered and pt agreeable to plan of care and goals.     Anticipated barriers to physical therapy: none    Goals:     Short Term Goals (8 Weeks): Updated 11/13/20  MET     1.Pt to increase strength by a 1/2 grade of muscles test to allow for improvement in functional activities such as performing chores.  2.Pt to improve range of motion by 25% to allow for improved functional mobility to allow for improvement in IADLs.   3.Pt to report compliance with HEP and demonstrate proper exercise technique to PT to show competence with self management of condition.  4.Decrease pain by 25% during functional activities.     Long Term Goals (12 Weeks):  In progress     1. Increase ROM to allow improved joint biomechanics during functional activities.   2.Increase trunk and upper extremity strength to within normal limits during functional activities.   3. Independent with home exercise program.   4. Full return to functional activities with manageable complaints.  5. Patient to demonstrate improved posture and body  mechanics.  6. Decrease pain by 75% during functional activities.      Plan     Neuromuscular and postural re-education,  training, therapeutic exercise, therapeutic activities,balance training, gait training, manual therapy, soft tissue mobilization, ROM exercises, Cardiovascular, Postural stabilization, manual traction, spinal mobilization, moist heat, cryotherapy, electrical stimulation, ultrasound, home exercise education and planning       Damien Romero, PT

## 2020-11-17 ENCOUNTER — CLINICAL SUPPORT (OUTPATIENT)
Dept: REHABILITATION | Facility: HOSPITAL | Age: 50
End: 2020-11-17
Payer: COMMERCIAL

## 2020-11-17 DIAGNOSIS — M54.2 NECK PAIN: ICD-10-CM

## 2020-11-17 DIAGNOSIS — R29.898 DECREASED RANGE OF MOTION OF NECK: ICD-10-CM

## 2020-11-17 DIAGNOSIS — R29.3 POSTURE IMBALANCE: ICD-10-CM

## 2020-11-17 PROCEDURE — 97110 THERAPEUTIC EXERCISES: CPT | Mod: PN

## 2020-11-17 NOTE — PROGRESS NOTES
Physical Therapy progress Note     Name: Heraclio Lam  Clinic Number: [id number]    Therapy Diagnosis:   Encounter Diagnoses   Name Primary?    Decreased range of motion of neck     Posture imbalance     Neck pain      Physician: Oly Grissom,*    Visit Date: 11/17/2020  Physician Orders: PT Eval and Treat   Medical Diagnosis from Referral:   M54.12 (ICD-10-CM) - Cervical radiculopathy  Evaluation Date: 8/28/2020  Plan of Care Expiration: 11/28/20     Authorization Period Expiration: 12/31/20  Visit # / Visits authorized: 7/ 40        Time In: 1345  Time Out: 1430     Total Billable Time: 45 minutes     Precautions: standard      Subjective     Pt reports: the neck continues to be sore but is doing better with therapy. C/o continued tingling into the hands.   He was compliant with home exercise program.  Response to previous treatment: good  Functional change: ongoing    Pain: 3/10  Location:  Cervical spine.     Objective     Heraclio received therapeutic exercises to develop strength, endurance, ROM, flexibility, posture and core stabilization for 45 minutes including:     -UBE x 6 min  -pulley 2 mins  -Supine chin tucks 2x10  -levator stretch 20 sec x 4  -pec stretch on 1/2 roll x2 minutes  -corner stretch 20 sec x 4  Supine wand flexion 2x10 2#  Supine horizontal ABD 2x10  Straight arm pull down black tubing 2x10  Rows black tubing  3x10  -seated thoracic extension with towel roll x 20      Heraclio received cold pack for 10 minutes to cervical spine.      Home Exercises Provided and Patient Education Provided     Education provided:   - Cont HEP    Written Home Exercises Provided: yes.  Exercises were reviewed and Heraclio was able to demonstrate them prior to the end of the session.  Heraclio demonstrated fair  understanding of the education provided.     See EMR under Patient Instructions for exercises provided 9/14/2020.    Assessment     No c/o increased discomfort with prescribed activities. .   Requires decreased cueing with postural correction with prescribed therex.  Good response to exercise progressing consisting of thoracic mobility and cervicothoracic stabilization therex.  Heraclio is progressing well towards his goals.      Heraclio is progressing well towards his goals.     Pt prognosis is Good.     Pt will continue to benefit from skilled outpatient physical therapy to address the deficits listed in the problem list box on initial evaluation, provide pt/family education and to maximize pt's level of independence in the home and community environment.     Pt's spiritual, cultural and educational needs considered and pt agreeable to plan of care and goals.     Anticipated barriers to physical therapy: none    Goals:     Short Term Goals (8 Weeks): Updated 11/13/20  MET     1.Pt to increase strength by a 1/2 grade of muscles test to allow for improvement in functional activities such as performing chores.  2.Pt to improve range of motion by 25% to allow for improved functional mobility to allow for improvement in IADLs.   3.Pt to report compliance with HEP and demonstrate proper exercise technique to PT to show competence with self management of condition.  4.Decrease pain by 25% during functional activities.     Long Term Goals (12 Weeks):  In progress     1. Increase ROM to allow improved joint biomechanics during functional activities.   2.Increase trunk and upper extremity strength to within normal limits during functional activities.   3. Independent with home exercise program.   4. Full return to functional activities with manageable complaints.  5. Patient to demonstrate improved posture and body mechanics.  6. Decrease pain by 75% during functional activities.      Plan     Neuromuscular and postural re-education,  training, therapeutic exercise, therapeutic activities,balance training, gait training, manual therapy, soft tissue mobilization, ROM exercises, Cardiovascular, Postural  stabilization, manual traction, spinal mobilization, moist heat, cryotherapy, electrical stimulation, ultrasound, home exercise education and planning.    Continue with established Plan of Care towards PT goals.  Will continue 1x per week progressing towards d/c planning.       Damien Romero, PT

## 2020-11-24 ENCOUNTER — CLINICAL SUPPORT (OUTPATIENT)
Dept: REHABILITATION | Facility: HOSPITAL | Age: 50
End: 2020-11-24
Payer: COMMERCIAL

## 2020-11-24 DIAGNOSIS — M54.2 NECK PAIN: ICD-10-CM

## 2020-11-24 DIAGNOSIS — R29.898 DECREASED RANGE OF MOTION OF NECK: ICD-10-CM

## 2020-11-24 DIAGNOSIS — R29.3 POSTURE IMBALANCE: ICD-10-CM

## 2020-11-24 PROCEDURE — 97110 THERAPEUTIC EXERCISES: CPT | Mod: PN

## 2020-11-24 NOTE — PROGRESS NOTES
Physical Therapy daily treatment Note     Name: Heraclio Lam  Clinic Number: [id number]    Therapy Diagnosis:   Encounter Diagnoses   Name Primary?    Decreased range of motion of neck     Posture imbalance     Neck pain      Physician: Oly Grissom,*    Visit Date: 11/24/2020  Physician Orders: PT Eval and Treat   Medical Diagnosis from Referral:   M54.12 (ICD-10-CM) - Cervical radiculopathy  Evaluation Date: 8/28/2020  Plan of Care Expiration: 11/28/20     Authorization Period Expiration: 12/31/20  Visit # / Visits authorized: 8/ 40        Time In: 1430  Time Out: 1515     Total Billable Time: 45 minutes     Precautions: standard      Subjective     Pt reports: the neck continues to be sore but is doing better with therapy. C/o continued tingling into the hands.   He was compliant with home exercise program.  Response to previous treatment: good  Functional change: ongoing    Pain: 3/10  Location:  Cervical spine.     Objective     Heraclio received therapeutic exercises to develop strength, endurance, ROM, flexibility, posture and core stabilization for 45 minutes including:     -UBE x 6 min  -pulley 2 mins  -Supine chin tucks 2x10  -levator stretch 20 sec x 4  -pec stretch on 1/2 roll x2 minutes  -corner stretch 20 sec x 4  Supine wand flexion 2x10 2#  Supine horizontal ABD 2x10  Straight arm pull down black tubing 2x10  Rows black tubing  3x10  -seated thoracic extension with towel roll x 20      Heraclio received cold pack for 10 minutes to cervical spine.      Home Exercises Provided and Patient Education Provided     Education provided:   - Cont HEP    Written Home Exercises Provided: yes.  Exercises were reviewed and Heraclio was able to demonstrate them prior to the end of the session.  Heraclio demonstrated fair  understanding of the education provided.     See EMR under Patient Instructions for exercises provided 9/14/2020.    Assessment     Continued moderate curing to improve postural  correction with therex. No c/o increased discomfort with prescribed activities.  Good response to exercise progressing consisting of thoracic mobility and cervicothoracic stabilization therex. Decreased symptoms reported post treatment.   Heraclio is progressing well towards his goals.       Heraclio is progressing well towards his goals.     Pt prognosis is Good.     Pt will continue to benefit from skilled outpatient physical therapy to address the deficits listed in the problem list box on initial evaluation, provide pt/family education and to maximize pt's level of independence in the home and community environment.     Pt's spiritual, cultural and educational needs considered and pt agreeable to plan of care and goals.     Anticipated barriers to physical therapy: none    Goals:     Short Term Goals (8 Weeks): Updated 11/13/20  MET     1.Pt to increase strength by a 1/2 grade of muscles test to allow for improvement in functional activities such as performing chores.  2.Pt to improve range of motion by 25% to allow for improved functional mobility to allow for improvement in IADLs.   3.Pt to report compliance with HEP and demonstrate proper exercise technique to PT to show competence with self management of condition.  4.Decrease pain by 25% during functional activities.     Long Term Goals (12 Weeks):  In progress     1. Increase ROM to allow improved joint biomechanics during functional activities.   2.Increase trunk and upper extremity strength to within normal limits during functional activities.   3. Independent with home exercise program.   4. Full return to functional activities with manageable complaints.  5. Patient to demonstrate improved posture and body mechanics.  6. Decrease pain by 75% during functional activities.      Plan     Neuromuscular and postural re-education,  training, therapeutic exercise, therapeutic activities,balance training, gait training, manual therapy, soft tissue  mobilization, ROM exercises, Cardiovascular, Postural stabilization, manual traction, spinal mobilization, moist heat, cryotherapy, electrical stimulation, ultrasound, home exercise education and planning.    Continue with established Plan of Care towards PT goals.  Will d/c next visit pending pt presentation.       Damien Romero, PT

## 2020-12-01 ENCOUNTER — CLINICAL SUPPORT (OUTPATIENT)
Dept: REHABILITATION | Facility: HOSPITAL | Age: 50
End: 2020-12-01
Payer: COMMERCIAL

## 2020-12-01 DIAGNOSIS — R29.898 DECREASED RANGE OF MOTION OF NECK: ICD-10-CM

## 2020-12-01 DIAGNOSIS — M54.2 NECK PAIN: ICD-10-CM

## 2020-12-01 DIAGNOSIS — R29.3 POSTURE IMBALANCE: ICD-10-CM

## 2020-12-01 PROCEDURE — 97110 THERAPEUTIC EXERCISES: CPT | Mod: PN

## 2020-12-01 NOTE — PROGRESS NOTES
Physical Therapy Discharge Note     Name: Heraclio Lam  Clinic Number: [id number]    Therapy Diagnosis:   Encounter Diagnoses   Name Primary?    Decreased range of motion of neck     Posture imbalance     Neck pain      Physician: Oly Grissom,*    Visit Date: 12/1/2020  Physician Orders: PT Eval and Treat   Medical Diagnosis from Referral:   M54.12 (ICD-10-CM) - Cervical radiculopathy  Evaluation Date: 8/28/2020  Plan of Care Expiration: 11/28/20     Authorization Period Expiration: 12/31/20  Visit # / Visits authorized: 9/ 40        Time In: 1430  Time Out: 1515     Total Billable Time: 45 minutes     Precautions: standard      Subjective     Pt reports: the neck continues to be sore but is doing better with therapy. C/o continued tingling into the hands.   He was compliant with home exercise program.  Response to previous treatment: good  Functional change: ongoing    Pain: 3/10  Location:  Cervical spine.     Objective       Range of Motion/Strength:       Cervical AROM: Degrees   Flexion 48   Extension 48   Right side bending 27   Left side bending 25   Right rotation 65   Left rotation 65         Heraclio received therapeutic exercises to develop strength, endurance, ROM, flexibility, posture and core stabilization for 45 minutes including:     -UBE x 6 min  -pulley 2 mins  -Supine chin tucks 2x10  -levator stretch 20 sec x 4  -pec stretch on 1/2 roll x2 minutes  -corner stretch 20 sec x 4  Supine wand flexion 2x10 2#  Supine horizontal ABD 2x10  Straight arm pull down black tubing 2x10  Rows black tubing  3x10  -seated thoracic extension with towel roll x 20      Home Exercises Provided and Patient Education Provided     Education provided:   - Cont HEP    Written Home Exercises Provided: yes.  Exercises were reviewed and Heraclio was able to demonstrate them prior to the end of the session.  Heraclio demonstrated fair  understanding of the education provided.     See EMR under Patient  Instructions for exercises provided 9/14/2020.    Assessment        Long Term Goals (12 Weeks):  In progress, partially MET 12/1/20     1. Increase ROM to allow improved joint biomechanics during functional activities.   2.Increase trunk and upper extremity strength to within normal limits during functional activities.   3. Independent with home exercise program.   4. Full return to functional activities with manageable complaints.  5. Patient to demonstrate improved posture and body mechanics.  6. Decrease pain by 75% during functional activities.    CMS Impairment/Limitation/Restriction for FOTO Neck Survey  Status Limitation G-Code CMS Severity Modifier  Intake 49% 51%  Predicted 57% 43% Goal Status+ CK - At least 40 percent but less than 60 percent  12/1/2020 45% 55% Current Status CK - At least 40 percent but less than 60 percent      Mr. Lam has attended 11 sessions in our clinic beginning 8/28/20 for PT consisting of manual therapy, modalities, ROM activities, therex and HEP instruction. The patient has responded well to physical therapy intervention. Demonstrates a good understanding of home program. Patient will discharged secondary to diminished complaints and partial goal achievement.     Plan     D/c to comprehensive home program. Pt to follow up with MD if further therapy is warranted.     Damien Romero, PT

## 2021-03-12 ENCOUNTER — OFFICE VISIT (OUTPATIENT)
Dept: INTERNAL MEDICINE | Facility: CLINIC | Age: 51
End: 2021-03-12
Payer: COMMERCIAL

## 2021-03-12 VITALS
DIASTOLIC BLOOD PRESSURE: 74 MMHG | SYSTOLIC BLOOD PRESSURE: 128 MMHG | WEIGHT: 209.44 LBS | HEART RATE: 95 BPM | OXYGEN SATURATION: 96 % | BODY MASS INDEX: 31.02 KG/M2 | HEIGHT: 69 IN

## 2021-03-12 DIAGNOSIS — Z00.00 ANNUAL PHYSICAL EXAM: Primary | ICD-10-CM

## 2021-03-12 DIAGNOSIS — K59.00 CONSTIPATION, UNSPECIFIED CONSTIPATION TYPE: ICD-10-CM

## 2021-03-12 DIAGNOSIS — M77.12 LATERAL EPICONDYLITIS OF LEFT ELBOW: ICD-10-CM

## 2021-03-12 DIAGNOSIS — Z98.890 HISTORY OF LAMINECTOMY: ICD-10-CM

## 2021-03-12 PROCEDURE — 3008F BODY MASS INDEX DOCD: CPT | Mod: CPTII,S$GLB,, | Performed by: INTERNAL MEDICINE

## 2021-03-12 PROCEDURE — 99386 PR PREVENTIVE VISIT,NEW,40-64: ICD-10-PCS | Mod: S$GLB,,, | Performed by: INTERNAL MEDICINE

## 2021-03-12 PROCEDURE — 3008F PR BODY MASS INDEX (BMI) DOCUMENTED: ICD-10-PCS | Mod: CPTII,S$GLB,, | Performed by: INTERNAL MEDICINE

## 2021-03-12 PROCEDURE — 1125F AMNT PAIN NOTED PAIN PRSNT: CPT | Mod: S$GLB,,, | Performed by: INTERNAL MEDICINE

## 2021-03-12 PROCEDURE — 99999 PR PBB SHADOW E&M-EST. PATIENT-LVL III: CPT | Mod: PBBFAC,,, | Performed by: INTERNAL MEDICINE

## 2021-03-12 PROCEDURE — 99386 PREV VISIT NEW AGE 40-64: CPT | Mod: S$GLB,,, | Performed by: INTERNAL MEDICINE

## 2021-03-12 PROCEDURE — 99999 PR PBB SHADOW E&M-EST. PATIENT-LVL III: ICD-10-PCS | Mod: PBBFAC,,, | Performed by: INTERNAL MEDICINE

## 2021-03-12 PROCEDURE — 1125F PR PAIN SEVERITY QUANTIFIED, PAIN PRESENT: ICD-10-PCS | Mod: S$GLB,,, | Performed by: INTERNAL MEDICINE

## 2021-03-15 ENCOUNTER — LAB VISIT (OUTPATIENT)
Dept: LAB | Facility: HOSPITAL | Age: 51
End: 2021-03-15
Attending: INTERNAL MEDICINE
Payer: COMMERCIAL

## 2021-03-15 DIAGNOSIS — Z00.00 ANNUAL PHYSICAL EXAM: ICD-10-CM

## 2021-03-15 LAB
ALBUMIN SERPL BCP-MCNC: 4.2 G/DL (ref 3.5–5.2)
ALP SERPL-CCNC: 53 U/L (ref 55–135)
ALT SERPL W/O P-5'-P-CCNC: 32 U/L (ref 10–44)
ANION GAP SERPL CALC-SCNC: 8 MMOL/L (ref 8–16)
AST SERPL-CCNC: 23 U/L (ref 10–40)
BASOPHILS # BLD AUTO: 0.02 K/UL (ref 0–0.2)
BASOPHILS NFR BLD: 0.3 % (ref 0–1.9)
BILIRUB SERPL-MCNC: 1.1 MG/DL (ref 0.1–1)
BUN SERPL-MCNC: 20 MG/DL (ref 6–20)
CALCIUM SERPL-MCNC: 8.7 MG/DL (ref 8.7–10.5)
CHLORIDE SERPL-SCNC: 104 MMOL/L (ref 95–110)
CHOLEST SERPL-MCNC: 143 MG/DL (ref 120–199)
CHOLEST/HDLC SERPL: 4.5 {RATIO} (ref 2–5)
CO2 SERPL-SCNC: 29 MMOL/L (ref 23–29)
CREAT SERPL-MCNC: 1 MG/DL (ref 0.5–1.4)
DIFFERENTIAL METHOD: ABNORMAL
EOSINOPHIL # BLD AUTO: 0.1 K/UL (ref 0–0.5)
EOSINOPHIL NFR BLD: 1 % (ref 0–8)
ERYTHROCYTE [DISTWIDTH] IN BLOOD BY AUTOMATED COUNT: 12.2 % (ref 11.5–14.5)
EST. GFR  (AFRICAN AMERICAN): >60 ML/MIN/1.73 M^2
EST. GFR  (NON AFRICAN AMERICAN): >60 ML/MIN/1.73 M^2
ESTIMATED AVG GLUCOSE: 97 MG/DL (ref 68–131)
GLUCOSE SERPL-MCNC: 102 MG/DL (ref 70–110)
HBA1C MFR BLD: 5 % (ref 4–5.6)
HCT VFR BLD AUTO: 44.6 % (ref 40–54)
HCV AB SERPL QL IA: NEGATIVE
HDLC SERPL-MCNC: 32 MG/DL (ref 40–75)
HDLC SERPL: 22.4 % (ref 20–50)
HGB BLD-MCNC: 15.8 G/DL (ref 14–18)
HIV 1+2 AB+HIV1 P24 AG SERPL QL IA: NEGATIVE
IMM GRANULOCYTES # BLD AUTO: 0.02 K/UL (ref 0–0.04)
IMM GRANULOCYTES NFR BLD AUTO: 0.3 % (ref 0–0.5)
LDLC SERPL CALC-MCNC: 78.2 MG/DL (ref 63–159)
LYMPHOCYTES # BLD AUTO: 2.7 K/UL (ref 1–4.8)
LYMPHOCYTES NFR BLD: 40.7 % (ref 18–48)
MCH RBC QN AUTO: 31.5 PG (ref 27–31)
MCHC RBC AUTO-ENTMCNC: 35.4 G/DL (ref 32–36)
MCV RBC AUTO: 89 FL (ref 82–98)
MONOCYTES # BLD AUTO: 0.7 K/UL (ref 0.3–1)
MONOCYTES NFR BLD: 10.5 % (ref 4–15)
NEUTROPHILS # BLD AUTO: 3.2 K/UL (ref 1.8–7.7)
NEUTROPHILS NFR BLD: 47.2 % (ref 38–73)
NONHDLC SERPL-MCNC: 111 MG/DL
NRBC BLD-RTO: 0 /100 WBC
PLATELET # BLD AUTO: 171 K/UL (ref 150–350)
PMV BLD AUTO: 11 FL (ref 9.2–12.9)
POTASSIUM SERPL-SCNC: 3.8 MMOL/L (ref 3.5–5.1)
PROT SERPL-MCNC: 7.5 G/DL (ref 6–8.4)
RBC # BLD AUTO: 5.01 M/UL (ref 4.6–6.2)
SODIUM SERPL-SCNC: 141 MMOL/L (ref 136–145)
TRIGL SERPL-MCNC: 164 MG/DL (ref 30–150)
WBC # BLD AUTO: 6.68 K/UL (ref 3.9–12.7)

## 2021-03-15 PROCEDURE — 86803 HEPATITIS C AB TEST: CPT | Performed by: INTERNAL MEDICINE

## 2021-03-15 PROCEDURE — 36415 COLL VENOUS BLD VENIPUNCTURE: CPT | Mod: PO | Performed by: INTERNAL MEDICINE

## 2021-03-15 PROCEDURE — 83036 HEMOGLOBIN GLYCOSYLATED A1C: CPT | Performed by: INTERNAL MEDICINE

## 2021-03-15 PROCEDURE — 80053 COMPREHEN METABOLIC PANEL: CPT | Performed by: INTERNAL MEDICINE

## 2021-03-15 PROCEDURE — 85025 COMPLETE CBC W/AUTO DIFF WBC: CPT | Performed by: INTERNAL MEDICINE

## 2021-03-15 PROCEDURE — 86703 HIV-1/HIV-2 1 RESULT ANTBDY: CPT | Performed by: INTERNAL MEDICINE

## 2021-03-15 PROCEDURE — 80061 LIPID PANEL: CPT | Performed by: INTERNAL MEDICINE

## 2021-03-20 ENCOUNTER — IMMUNIZATION (OUTPATIENT)
Dept: PRIMARY CARE CLINIC | Facility: CLINIC | Age: 51
End: 2021-03-20
Payer: COMMERCIAL

## 2021-03-20 DIAGNOSIS — Z23 NEED FOR VACCINATION: Primary | ICD-10-CM

## 2021-03-20 PROCEDURE — 91300 COVID-19, MRNA, LNP-S, PF, 30 MCG/0.3 ML DOSE VACCINE: ICD-10-PCS | Mod: S$GLB,,, | Performed by: FAMILY MEDICINE

## 2021-03-20 PROCEDURE — 91300 COVID-19, MRNA, LNP-S, PF, 30 MCG/0.3 ML DOSE VACCINE: CPT | Mod: S$GLB,,, | Performed by: FAMILY MEDICINE

## 2021-03-20 PROCEDURE — 0001A COVID-19, MRNA, LNP-S, PF, 30 MCG/0.3 ML DOSE VACCINE: CPT | Mod: CV19,S$GLB,, | Performed by: FAMILY MEDICINE

## 2021-03-20 PROCEDURE — 0001A COVID-19, MRNA, LNP-S, PF, 30 MCG/0.3 ML DOSE VACCINE: ICD-10-PCS | Mod: CV19,S$GLB,, | Performed by: FAMILY MEDICINE

## 2021-04-10 ENCOUNTER — IMMUNIZATION (OUTPATIENT)
Dept: PRIMARY CARE CLINIC | Facility: CLINIC | Age: 51
End: 2021-04-10
Payer: COMMERCIAL

## 2021-04-10 DIAGNOSIS — Z23 NEED FOR VACCINATION: Primary | ICD-10-CM

## 2021-04-10 PROCEDURE — 0002A COVID-19, MRNA, LNP-S, PF, 30 MCG/0.3 ML DOSE VACCINE: ICD-10-PCS | Mod: CV19,S$GLB,, | Performed by: FAMILY MEDICINE

## 2021-04-10 PROCEDURE — 91300 COVID-19, MRNA, LNP-S, PF, 30 MCG/0.3 ML DOSE VACCINE: CPT | Mod: S$GLB,,, | Performed by: FAMILY MEDICINE

## 2021-04-10 PROCEDURE — 91300 COVID-19, MRNA, LNP-S, PF, 30 MCG/0.3 ML DOSE VACCINE: ICD-10-PCS | Mod: S$GLB,,, | Performed by: FAMILY MEDICINE

## 2021-04-10 PROCEDURE — 0002A COVID-19, MRNA, LNP-S, PF, 30 MCG/0.3 ML DOSE VACCINE: CPT | Mod: CV19,S$GLB,, | Performed by: FAMILY MEDICINE

## 2021-06-14 ENCOUNTER — OFFICE VISIT (OUTPATIENT)
Dept: INTERNAL MEDICINE | Facility: CLINIC | Age: 51
End: 2021-06-14
Payer: COMMERCIAL

## 2021-06-14 VITALS
HEART RATE: 69 BPM | SYSTOLIC BLOOD PRESSURE: 122 MMHG | HEIGHT: 69 IN | BODY MASS INDEX: 29.78 KG/M2 | WEIGHT: 201.06 LBS | DIASTOLIC BLOOD PRESSURE: 78 MMHG | OXYGEN SATURATION: 99 %

## 2021-06-14 DIAGNOSIS — M54.12 CERVICAL RADICULOPATHY: Primary | ICD-10-CM

## 2021-06-14 DIAGNOSIS — Z00.00 HEALTHCARE MAINTENANCE: ICD-10-CM

## 2021-06-14 DIAGNOSIS — Z01.89 ENCOUNTER FOR ROUTINE LABORATORY TESTING: ICD-10-CM

## 2021-06-14 PROCEDURE — 3008F PR BODY MASS INDEX (BMI) DOCUMENTED: ICD-10-PCS | Mod: CPTII,S$GLB,, | Performed by: INTERNAL MEDICINE

## 2021-06-14 PROCEDURE — 99214 OFFICE O/P EST MOD 30 MIN: CPT | Mod: S$GLB,,, | Performed by: INTERNAL MEDICINE

## 2021-06-14 PROCEDURE — 3008F BODY MASS INDEX DOCD: CPT | Mod: CPTII,S$GLB,, | Performed by: INTERNAL MEDICINE

## 2021-06-14 PROCEDURE — 99999 PR PBB SHADOW E&M-EST. PATIENT-LVL III: ICD-10-PCS | Mod: PBBFAC,,, | Performed by: INTERNAL MEDICINE

## 2021-06-14 PROCEDURE — 99214 PR OFFICE/OUTPT VISIT, EST, LEVL IV, 30-39 MIN: ICD-10-PCS | Mod: S$GLB,,, | Performed by: INTERNAL MEDICINE

## 2021-06-14 PROCEDURE — 99999 PR PBB SHADOW E&M-EST. PATIENT-LVL III: CPT | Mod: PBBFAC,,, | Performed by: INTERNAL MEDICINE

## 2021-06-14 PROCEDURE — 1126F AMNT PAIN NOTED NONE PRSNT: CPT | Mod: S$GLB,,, | Performed by: INTERNAL MEDICINE

## 2021-06-14 PROCEDURE — 1126F PR PAIN SEVERITY QUANTIFIED, NO PAIN PRESENT: ICD-10-PCS | Mod: S$GLB,,, | Performed by: INTERNAL MEDICINE

## 2021-06-22 ENCOUNTER — LAB VISIT (OUTPATIENT)
Dept: LAB | Facility: HOSPITAL | Age: 51
End: 2021-06-22
Attending: INTERNAL MEDICINE
Payer: COMMERCIAL

## 2021-06-22 DIAGNOSIS — Z01.89 ENCOUNTER FOR ROUTINE LABORATORY TESTING: ICD-10-CM

## 2021-06-22 LAB
ALBUMIN SERPL BCP-MCNC: 4.2 G/DL (ref 3.5–5.2)
ALP SERPL-CCNC: 66 U/L (ref 55–135)
ALT SERPL W/O P-5'-P-CCNC: 28 U/L (ref 10–44)
ANION GAP SERPL CALC-SCNC: 11 MMOL/L (ref 8–16)
AST SERPL-CCNC: 20 U/L (ref 10–40)
BASOPHILS # BLD AUTO: 0.02 K/UL (ref 0–0.2)
BASOPHILS NFR BLD: 0.2 % (ref 0–1.9)
BILIRUB SERPL-MCNC: 0.6 MG/DL (ref 0.1–1)
BUN SERPL-MCNC: 17 MG/DL (ref 6–20)
CALCIUM SERPL-MCNC: 10 MG/DL (ref 8.7–10.5)
CHLORIDE SERPL-SCNC: 104 MMOL/L (ref 95–110)
CHOLEST SERPL-MCNC: 212 MG/DL (ref 120–199)
CHOLEST/HDLC SERPL: 6.8 {RATIO} (ref 2–5)
CO2 SERPL-SCNC: 28 MMOL/L (ref 23–29)
CREAT SERPL-MCNC: 1.1 MG/DL (ref 0.5–1.4)
DIFFERENTIAL METHOD: NORMAL
EOSINOPHIL # BLD AUTO: 0.1 K/UL (ref 0–0.5)
EOSINOPHIL NFR BLD: 0.7 % (ref 0–8)
ERYTHROCYTE [DISTWIDTH] IN BLOOD BY AUTOMATED COUNT: 12 % (ref 11.5–14.5)
EST. GFR  (AFRICAN AMERICAN): >60 ML/MIN/1.73 M^2
EST. GFR  (NON AFRICAN AMERICAN): >60 ML/MIN/1.73 M^2
ESTIMATED AVG GLUCOSE: 103 MG/DL (ref 68–131)
GLUCOSE SERPL-MCNC: 90 MG/DL (ref 70–110)
HBA1C MFR BLD: 5.2 % (ref 4–5.6)
HCT VFR BLD AUTO: 50.7 % (ref 40–54)
HDLC SERPL-MCNC: 31 MG/DL (ref 40–75)
HDLC SERPL: 14.6 % (ref 20–50)
HGB BLD-MCNC: 17 G/DL (ref 14–18)
IMM GRANULOCYTES # BLD AUTO: 0.04 K/UL (ref 0–0.04)
IMM GRANULOCYTES NFR BLD AUTO: 0.5 % (ref 0–0.5)
LDLC SERPL CALC-MCNC: ABNORMAL MG/DL (ref 63–159)
LYMPHOCYTES # BLD AUTO: 2.7 K/UL (ref 1–4.8)
LYMPHOCYTES NFR BLD: 31.2 % (ref 18–48)
MCH RBC QN AUTO: 30.4 PG (ref 27–31)
MCHC RBC AUTO-ENTMCNC: 33.5 G/DL (ref 32–36)
MCV RBC AUTO: 91 FL (ref 82–98)
MONOCYTES # BLD AUTO: 0.6 K/UL (ref 0.3–1)
MONOCYTES NFR BLD: 6.6 % (ref 4–15)
NEUTROPHILS # BLD AUTO: 5.2 K/UL (ref 1.8–7.7)
NEUTROPHILS NFR BLD: 60.8 % (ref 38–73)
NONHDLC SERPL-MCNC: 181 MG/DL
NRBC BLD-RTO: 0 /100 WBC
PLATELET # BLD AUTO: 187 K/UL (ref 150–450)
PMV BLD AUTO: 11 FL (ref 9.2–12.9)
POTASSIUM SERPL-SCNC: 5.1 MMOL/L (ref 3.5–5.1)
PROT SERPL-MCNC: 7.9 G/DL (ref 6–8.4)
RBC # BLD AUTO: 5.6 M/UL (ref 4.6–6.2)
SODIUM SERPL-SCNC: 143 MMOL/L (ref 136–145)
TRIGL SERPL-MCNC: 678 MG/DL (ref 30–150)
WBC # BLD AUTO: 8.52 K/UL (ref 3.9–12.7)

## 2021-06-22 PROCEDURE — 83036 HEMOGLOBIN GLYCOSYLATED A1C: CPT | Performed by: INTERNAL MEDICINE

## 2021-06-22 PROCEDURE — 36415 COLL VENOUS BLD VENIPUNCTURE: CPT | Performed by: INTERNAL MEDICINE

## 2021-06-22 PROCEDURE — 80061 LIPID PANEL: CPT | Performed by: INTERNAL MEDICINE

## 2021-06-22 PROCEDURE — 80053 COMPREHEN METABOLIC PANEL: CPT | Performed by: INTERNAL MEDICINE

## 2021-06-22 PROCEDURE — 85025 COMPLETE CBC W/AUTO DIFF WBC: CPT | Performed by: INTERNAL MEDICINE

## 2021-12-06 ENCOUNTER — IMMUNIZATION (OUTPATIENT)
Dept: FAMILY MEDICINE | Facility: CLINIC | Age: 51
End: 2021-12-06
Payer: COMMERCIAL

## 2021-12-06 DIAGNOSIS — Z23 FLU VACCINE NEED: Primary | ICD-10-CM

## 2021-12-06 PROCEDURE — 90471 FLU VACCINE (QUAD) GREATER THAN OR EQUAL TO 3YO PRESERVATIVE FREE IM: ICD-10-PCS | Mod: S$GLB,,, | Performed by: INTERNAL MEDICINE

## 2021-12-06 PROCEDURE — 90686 IIV4 VACC NO PRSV 0.5 ML IM: CPT | Mod: S$GLB,,, | Performed by: INTERNAL MEDICINE

## 2021-12-06 PROCEDURE — 90471 IMMUNIZATION ADMIN: CPT | Mod: S$GLB,,, | Performed by: INTERNAL MEDICINE

## 2021-12-06 PROCEDURE — 90686 FLU VACCINE (QUAD) GREATER THAN OR EQUAL TO 3YO PRESERVATIVE FREE IM: ICD-10-PCS | Mod: S$GLB,,, | Performed by: INTERNAL MEDICINE

## 2021-12-10 ENCOUNTER — IMMUNIZATION (OUTPATIENT)
Dept: OBSTETRICS AND GYNECOLOGY | Facility: CLINIC | Age: 51
End: 2021-12-10
Payer: COMMERCIAL

## 2021-12-10 DIAGNOSIS — Z23 NEED FOR VACCINATION: Primary | ICD-10-CM

## 2021-12-10 PROCEDURE — 0004A COVID-19, MRNA, LNP-S, PF, 30 MCG/0.3 ML DOSE VACCINE: CPT | Mod: PBBFAC | Performed by: FAMILY MEDICINE

## 2022-05-30 NOTE — TELEPHONE ENCOUNTER
Attempted to contact patient to remind that psychological testing is required prior to scheduling the SCS Trial and to please contact Hi Gauthier at the Dept of Psychiatry at [phone], no answer, left detailed voice message and requested a return call.  
Abdominal Pain, N/V/D

## 2022-07-24 ENCOUNTER — HOSPITAL ENCOUNTER (EMERGENCY)
Facility: HOSPITAL | Age: 52
Discharge: HOME | End: 2022-07-24
Attending: INTERNAL MEDICINE
Payer: COMMERCIAL

## 2022-07-24 VITALS
TEMPERATURE: 99 F | BODY MASS INDEX: 29.62 KG/M2 | WEIGHT: 200 LBS | SYSTOLIC BLOOD PRESSURE: 161 MMHG | HEIGHT: 69 IN | HEART RATE: 70 BPM | DIASTOLIC BLOOD PRESSURE: 88 MMHG | OXYGEN SATURATION: 99 % | RESPIRATION RATE: 17 BRPM

## 2022-07-24 DIAGNOSIS — R10.13 EPIGASTRIC PAIN: Primary | ICD-10-CM

## 2022-07-24 PROCEDURE — 99283 EMERGENCY DEPT VISIT LOW MDM: CPT | Mod: 25

## 2022-07-24 PROCEDURE — 93005 ELECTROCARDIOGRAM TRACING: CPT

## 2022-07-24 PROCEDURE — 25000003 PHARM REV CODE 250: Performed by: PHYSICIAN ASSISTANT

## 2022-07-24 PROCEDURE — 93010 ELECTROCARDIOGRAM REPORT: CPT | Mod: ,,, | Performed by: INTERNAL MEDICINE

## 2022-07-24 PROCEDURE — 93010 EKG 12-LEAD: ICD-10-PCS | Mod: ,,, | Performed by: INTERNAL MEDICINE

## 2022-07-24 RX ORDER — MAG HYDROX/ALUMINUM HYD/SIMETH 200-200-20
30 SUSPENSION, ORAL (FINAL DOSE FORM) ORAL ONCE
Status: COMPLETED | OUTPATIENT
Start: 2022-07-24 | End: 2022-07-24

## 2022-07-24 RX ORDER — FAMOTIDINE 20 MG/1
20 TABLET, FILM COATED ORAL 2 TIMES DAILY
Qty: 60 TABLET | Refills: 0 | Status: SHIPPED | OUTPATIENT
Start: 2022-07-24 | End: 2022-10-04 | Stop reason: SDUPTHER

## 2022-07-24 RX ORDER — LIDOCAINE HYDROCHLORIDE 20 MG/ML
10 SOLUTION OROPHARYNGEAL ONCE
Status: COMPLETED | OUTPATIENT
Start: 2022-07-24 | End: 2022-07-24

## 2022-07-24 RX ADMIN — LIDOCAINE HYDROCHLORIDE 10 ML: 20 SOLUTION ORAL; TOPICAL at 05:07

## 2022-07-24 RX ADMIN — ALUMINUM HYDROXIDE, MAGNESIUM HYDROXIDE, AND SIMETHICONE 30 ML: 200; 200; 20 SUSPENSION ORAL at 05:07

## 2022-07-24 NOTE — ED PROVIDER NOTES
Encounter Date: 7/24/2022       History     Chief Complaint   Patient presents with    Gastroesophageal Reflux     Pt reports to ED stating he could not sleep because he has a burning sensation in his epigastric area going to this throat when he lays down; pt reports hx of this problem and he drinks mineral water but does not take any prescribed medications; pt also reports chronic nerve/back problems (unchanged) ongoing since 1990s; pt also reports being Covid positive     53yo M with epigastric pain, frequent belching x years. Pt presents to ED with ?father for evaluation due to severe belching this evening. Pt states burning discomfort to epigastrum, particularly following certain meals x years. States pain worsened with recumbency. States sometimes radiates into his throat with bitter tongue sensation/discomfort. No n/v. No personal hx ACS. No HTN, HLD, DM. No associated lightheadedness, palpitations, SOB, syncope. No cough. No fever. No BM complaints. No melena or hematochezia. No meds taken for relief.     PMH:  Arthritis  Cervical radiculopathy  Chronic pain syndrome            Review of patient's allergies indicates:  No Known Allergies  Past Medical History:   Diagnosis Date    Arthritis      Past Surgical History:   Procedure Laterality Date    CSF SHUNT      EPIDURAL STEROID INJECTION N/A 12/4/2018    Procedure: Injection, Steroid, Epidural CERVICAL T1-2 INTERLAMINAR BATSHEVA;  Surgeon: Elba Juarez MD;  Location: [facility];  Service: Pain Management;  Laterality: N/A;    EPIDURAL STEROID INJECTION N/A 1/8/2019    Procedure: Injection, Steroid, Epidural CERVICAL T1-2 IL BATSHEVA;  Surgeon: Elba Juarez MD;  Location: [facility];  Service: Pain Management;  Laterality: N/A;    EPIDURAL STEROID INJECTION N/A 8/20/2020    Procedure: INJECTION, STEROID, EPIDURAL T1-2 IL BATSHEVA;  Surgeon: Elba Juarez MD;  Location: [facility];  Service: Pain Management;  Laterality: N/A;  T1-2 IL BATSHEVA    consent needed    JOINT REPLACEMENT  2006    disc Sx , cervical    SPINE SURGERY       Family History   Problem Relation Age of Onset    Hypertension Mother     Kidney failure Father     Liver disease Father         CHILDERS cirrhosis s/p tx    Cancer Sister 62        sarcoma     Social History     Tobacco Use    Smoking status: Never Smoker    Smokeless tobacco: Never Used   Substance Use Topics    Alcohol use: Not Currently    Drug use: No     Review of Systems   Constitutional: Negative for fever.   Respiratory: Negative for cough and shortness of breath.    Cardiovascular: Negative for chest pain and palpitations.   Gastrointestinal: Positive for abdominal pain. Negative for blood in stool, nausea and vomiting.   Musculoskeletal: Negative for back pain.   Neurological: Negative for syncope.       Physical Exam     Initial Vitals [07/24/22 0357]   BP Pulse Resp Temp SpO2   (!) 159/93 66 17 98.6 °F (37 °C) 100 %      MAP       --         Physical Exam    Nursing note and vitals reviewed.  Constitutional: He appears well-developed and well-nourished. He is not diaphoretic. No distress.   HENT:   Head: Normocephalic and atraumatic.   Neck: Neck supple.   Pulmonary/Chest: Breath sounds normal. No respiratory distress.   Abdominal: There is no abdominal tenderness.   Musculoskeletal:         General: Normal range of motion.      Cervical back: Neck supple.     Neurological: He is alert and oriented to person, place, and time. GCS score is 15. GCS eye subscore is 4. GCS verbal subscore is 5. GCS motor subscore is 6.   Skin: Skin is warm. Capillary refill takes less than 2 seconds.         ED Course   Procedures  Labs Reviewed - No data to display  EKG Readings: (Independently Interpreted)   Normal sinus rhythm, ventricular rate 62 beats per minute.  Normal CT, normal QT.  No right axis deviation.  No ST elevation.  No previous for comparison.  Mild artifact.       Imaging Results    None          Medications    aluminum-magnesium hydroxide-simethicone 200-200-20 mg/5 mL suspension 30 mL (30 mLs Oral Given 7/24/22 0502)     And   LIDOcaine HCl 2% oral solution 10 mL (10 mLs Oral Given 7/24/22 0502)     Medical Decision Making:   Differential Diagnosis:   GERD, gastritis, esophagitis, ACS, aortic dissection  Clinical Tests:   Medical Tests: Reviewed and Ordered  ED Management:  Presentation suspicious for GERD or gastric etiology. Symptoms x years. No cough. No fever. No SOB. No CP. No c/o ripping/tearing pain. Low suspicion for dissection. Low suspicion for bacterial infection or sepsis. Low suspicion for emergent process or surgical abdomen. Low suspicion for ACS; EKG reassuring. No significant cardiac hx or ACS risk factors. Advised f/u with PCP for persistent symptoms, will start on pepcid BID, advised GERD diet. Return precautions given.                       Clinical Impression:   Final diagnoses:  [R10.13] Epigastric pain (Primary)          ED Disposition Condition    Discharge Stable        ED Prescriptions     Medication Sig Dispense Start Date End Date Auth. Provider    famotidine (PEPCID) 20 MG tablet Take 1 tablet (20 mg total) by mouth 2 (two) times daily. 60 tablet 7/24/2022 8/23/2022 Pavan Barrios PA-C        Follow-up Information     Follow up With Specialties Details Why Contact Info    Louis Garay MD Internal Medicine Schedule an appointment as soon as possible for a visit  For reevaluation, If symptoms persist [address]  [place] [address]  [phone]             Pavan Barrios PA-C  07/24/22 211

## 2022-07-24 NOTE — ED TRIAGE NOTES
"Pt reports to ED via personal transportation with father stating he could not sleep because he has a burning sensation in his epigastric area going to this throat when he lays down; pt reports hx "acid reflux" and that he drinks mineral water but does not take any prescription medications; pt also reports chronic nerve/back problems (unchanged) ongoing since 1990s and needs to follow up with neurologist; pt also endorses that he tested Covid positive a few days ago; pt AAOx4, ambulates with steady gait, no assist needed; no distress noted    "

## 2022-07-24 NOTE — DISCHARGE INSTRUCTIONS
Strict GERD diet.  Pepcid twice daily.  Follow-up with primary for re-evaluation should symptoms persist.    Return to this ED if you continue with stomach pain, if you begin with nausea vomiting, if you develop chest pain or trouble breathing, if you begin with black or bloody stools, if any other problems occur.

## 2022-10-04 ENCOUNTER — OFFICE VISIT (OUTPATIENT)
Dept: INTERNAL MEDICINE | Facility: CLINIC | Age: 52
End: 2022-10-04
Payer: COMMERCIAL

## 2022-10-04 VITALS
WEIGHT: 207.81 LBS | HEIGHT: 69 IN | OXYGEN SATURATION: 92 % | SYSTOLIC BLOOD PRESSURE: 132 MMHG | DIASTOLIC BLOOD PRESSURE: 82 MMHG | BODY MASS INDEX: 30.78 KG/M2 | HEART RATE: 80 BPM

## 2022-10-04 DIAGNOSIS — G89.29 CHRONIC NECK AND BACK PAIN: ICD-10-CM

## 2022-10-04 DIAGNOSIS — M54.2 CHRONIC NECK AND BACK PAIN: ICD-10-CM

## 2022-10-04 DIAGNOSIS — Z12.11 SCREENING FOR COLON CANCER: ICD-10-CM

## 2022-10-04 DIAGNOSIS — Z00.00 ANNUAL PHYSICAL EXAM: Primary | ICD-10-CM

## 2022-10-04 DIAGNOSIS — R51.9 CHRONIC DAILY HEADACHE: ICD-10-CM

## 2022-10-04 DIAGNOSIS — M54.9 CHRONIC NECK AND BACK PAIN: ICD-10-CM

## 2022-10-04 DIAGNOSIS — K21.9 GASTROESOPHAGEAL REFLUX DISEASE, UNSPECIFIED WHETHER ESOPHAGITIS PRESENT: ICD-10-CM

## 2022-10-04 PROCEDURE — 3008F BODY MASS INDEX DOCD: CPT | Mod: CPTII,S$GLB,, | Performed by: INTERNAL MEDICINE

## 2022-10-04 PROCEDURE — 1160F RVW MEDS BY RX/DR IN RCRD: CPT | Mod: CPTII,S$GLB,, | Performed by: INTERNAL MEDICINE

## 2022-10-04 PROCEDURE — 3079F PR MOST RECENT DIASTOLIC BLOOD PRESSURE 80-89 MM HG: ICD-10-PCS | Mod: CPTII,S$GLB,, | Performed by: INTERNAL MEDICINE

## 2022-10-04 PROCEDURE — 99999 PR PBB SHADOW E&M-EST. PATIENT-LVL IV: CPT | Mod: PBBFAC,,, | Performed by: INTERNAL MEDICINE

## 2022-10-04 PROCEDURE — 99396 PREV VISIT EST AGE 40-64: CPT | Mod: 25,S$GLB,, | Performed by: INTERNAL MEDICINE

## 2022-10-04 PROCEDURE — 90715 TDAP VACCINE GREATER THAN OR EQUAL TO 7YO IM: ICD-10-PCS | Mod: S$GLB,,, | Performed by: INTERNAL MEDICINE

## 2022-10-04 PROCEDURE — 3008F PR BODY MASS INDEX (BMI) DOCUMENTED: ICD-10-PCS | Mod: CPTII,S$GLB,, | Performed by: INTERNAL MEDICINE

## 2022-10-04 PROCEDURE — 1159F MED LIST DOCD IN RCRD: CPT | Mod: CPTII,S$GLB,, | Performed by: INTERNAL MEDICINE

## 2022-10-04 PROCEDURE — 90471 TDAP VACCINE GREATER THAN OR EQUAL TO 7YO IM: ICD-10-PCS | Mod: S$GLB,,, | Performed by: INTERNAL MEDICINE

## 2022-10-04 PROCEDURE — 3079F DIAST BP 80-89 MM HG: CPT | Mod: CPTII,S$GLB,, | Performed by: INTERNAL MEDICINE

## 2022-10-04 PROCEDURE — 90471 IMMUNIZATION ADMIN: CPT | Mod: S$GLB,,, | Performed by: INTERNAL MEDICINE

## 2022-10-04 PROCEDURE — 3075F SYST BP GE 130 - 139MM HG: CPT | Mod: CPTII,S$GLB,, | Performed by: INTERNAL MEDICINE

## 2022-10-04 PROCEDURE — 1160F PR REVIEW ALL MEDS BY PRESCRIBER/CLIN PHARMACIST DOCUMENTED: ICD-10-PCS | Mod: CPTII,S$GLB,, | Performed by: INTERNAL MEDICINE

## 2022-10-04 PROCEDURE — 1159F PR MEDICATION LIST DOCUMENTED IN MEDICAL RECORD: ICD-10-PCS | Mod: CPTII,S$GLB,, | Performed by: INTERNAL MEDICINE

## 2022-10-04 PROCEDURE — 99396 PR PREVENTIVE VISIT,EST,40-64: ICD-10-PCS | Mod: 25,S$GLB,, | Performed by: INTERNAL MEDICINE

## 2022-10-04 PROCEDURE — 90715 TDAP VACCINE 7 YRS/> IM: CPT | Mod: S$GLB,,, | Performed by: INTERNAL MEDICINE

## 2022-10-04 PROCEDURE — 99999 PR PBB SHADOW E&M-EST. PATIENT-LVL IV: ICD-10-PCS | Mod: PBBFAC,,, | Performed by: INTERNAL MEDICINE

## 2022-10-04 PROCEDURE — 3075F PR MOST RECENT SYSTOLIC BLOOD PRESS GE 130-139MM HG: ICD-10-PCS | Mod: CPTII,S$GLB,, | Performed by: INTERNAL MEDICINE

## 2022-10-04 RX ORDER — AMITRIPTYLINE HYDROCHLORIDE 25 MG/1
25 TABLET, FILM COATED ORAL NIGHTLY PRN
Qty: 30 TABLET | Refills: 3 | Status: SHIPPED | OUTPATIENT
Start: 2022-10-04 | End: 2023-03-03 | Stop reason: SDUPTHER

## 2022-10-04 RX ORDER — FAMOTIDINE 20 MG/1
20 TABLET, FILM COATED ORAL 2 TIMES DAILY
Qty: 180 TABLET | Refills: 3 | OUTPATIENT
Start: 2022-10-04 | End: 2023-01-02

## 2022-10-04 NOTE — PROGRESS NOTES
"    CHIEF COMPLAINT     Chief Complaint   Patient presents with    Follow-up       HPI     Heraclio Lam is a 52 y.o. male w/ Cervical Post laminectomy syndrome, GERD here today for annual visit    Last seen by me >12 m ago.  Seen in the ER in July for GERD was started on reflux medications symptoms have resolved.  Still taking Pepcid    Reports having chronic daily headache in the front of his head feels like it wraps around and goes starts in his neck.  Patient with history of chronic neck pain status post intervention with postprocedural pain.  Was previously on gabapentin which were not particularly helpful.  He has been off medication for several months.    Personally Reviewed Patient's Medical, surgical, family and social hx. Changes updated in [employer].  Care Team updated in Epic    Review of Systems:  Review of Systems   Neurological:  Positive for headaches.   Psychiatric/Behavioral:  Positive for dysphoric mood and sleep disturbance.      Health Maintenance:   Reviewed with patient  Due for the following:      PHYSICAL EXAM     /82 (BP Location: Right arm, Patient Position: Sitting, BP Method: Medium (Manual))   Pulse 80   Ht 5' 9" (1.753 m)   Wt 94.3 kg (207 lb 12.5 oz)   SpO2 (!) 92%   BMI 30.68 kg/m²     Gen: Well Appearing, NAD  HEENT: PERR, EOMI  Neck: FROM, no thyromegaly, no cervical adenopathy tender palpation bilateral trapezius muscles  CVD: RRR, no M/R/G  Pulm: Normal work of breathing, CTAB, no wheezing  Abd:  Soft, NT, ND non TTP, no mass  MSK: no LE edema  Neuro: A&Ox3, gait normal, speech normal  Mood; Mood normal, behavior normal, thought process linear       LABS     Labs reviewed; ordered  ASSESSMENT     1. Annual physical exam  CBC Auto Differential    Comprehensive Metabolic Panel    Hemoglobin A1C    Lipid Panel      2. Gastroesophageal reflux disease, unspecified whether esophagitis present        3. Chronic daily headache  amitriptyline (ELAVIL) 25 MG tablet      4. " Screening for colon cancer  Ambulatory referral/consult to Endo Procedure       5. Chronic neck and back pain                Plan     Heraclio Lam is a 52 y.o. male with  Cervical Post laminectomy syndrome, GERD     1. Annual physical exam  Updated problem list, medical history, care team and discussed HM.     - CBC Auto Differential; Future  - Comprehensive Metabolic Panel; Future  - Hemoglobin A1C; Future  - Lipid Panel; Future    2. Gastroesophageal reflux disease, unspecified whether esophagitis present  Continue Pepcid    3. Chronic daily headache  Think this is chronic tension headache with significant cervicogenic component.  Will try nighttime dose of amitriptyline to help with both sleep in neck pain/headache.  If symptoms not improved with addition medication would send patient back to physical therapy possibly for some dry needling to his trapezius muscles  - amitriptyline (ELAVIL) 25 MG tablet; Take 1 tablet (25 mg total) by mouth nightly as needed for Insomnia.  Dispense: 30 tablet; Refill: 3    4. Screening for colon cancer  - Ambulatory referral/consult to Endo Procedure ; Future    5. Chronic neck and back pain  Will try amitriptyline.  S patient about return to physical therapy.  Will strain amitriptyline 1st.  Louis Garay MD

## 2022-10-05 ENCOUNTER — LAB VISIT (OUTPATIENT)
Dept: LAB | Facility: HOSPITAL | Age: 52
End: 2022-10-05
Attending: INTERNAL MEDICINE
Payer: COMMERCIAL

## 2022-10-05 DIAGNOSIS — Z00.00 ANNUAL PHYSICAL EXAM: ICD-10-CM

## 2022-10-05 LAB
ALBUMIN SERPL BCP-MCNC: 3.9 G/DL (ref 3.5–5.2)
ALP SERPL-CCNC: 56 U/L (ref 55–135)
ALT SERPL W/O P-5'-P-CCNC: 27 U/L (ref 10–44)
ANION GAP SERPL CALC-SCNC: 8 MMOL/L (ref 8–16)
AST SERPL-CCNC: 18 U/L (ref 10–40)
BASOPHILS # BLD AUTO: 0.02 K/UL (ref 0–0.2)
BASOPHILS NFR BLD: 0.3 % (ref 0–1.9)
BILIRUB SERPL-MCNC: 0.8 MG/DL (ref 0.1–1)
BUN SERPL-MCNC: 17 MG/DL (ref 6–20)
CALCIUM SERPL-MCNC: 8.8 MG/DL (ref 8.7–10.5)
CHLORIDE SERPL-SCNC: 103 MMOL/L (ref 95–110)
CHOLEST SERPL-MCNC: 164 MG/DL (ref 120–199)
CHOLEST/HDLC SERPL: 5.1 {RATIO} (ref 2–5)
CO2 SERPL-SCNC: 27 MMOL/L (ref 23–29)
CREAT SERPL-MCNC: 0.9 MG/DL (ref 0.5–1.4)
DIFFERENTIAL METHOD: ABNORMAL
EOSINOPHIL # BLD AUTO: 0.1 K/UL (ref 0–0.5)
EOSINOPHIL NFR BLD: 1.5 % (ref 0–8)
ERYTHROCYTE [DISTWIDTH] IN BLOOD BY AUTOMATED COUNT: 12.1 % (ref 11.5–14.5)
EST. GFR  (NO RACE VARIABLE): >60 ML/MIN/1.73 M^2
ESTIMATED AVG GLUCOSE: 105 MG/DL (ref 68–131)
GLUCOSE SERPL-MCNC: 110 MG/DL (ref 70–110)
HBA1C MFR BLD: 5.3 % (ref 4–5.6)
HCT VFR BLD AUTO: 46 % (ref 40–54)
HDLC SERPL-MCNC: 32 MG/DL (ref 40–75)
HDLC SERPL: 19.5 % (ref 20–50)
HGB BLD-MCNC: 16.3 G/DL (ref 14–18)
IMM GRANULOCYTES # BLD AUTO: 0.04 K/UL (ref 0–0.04)
IMM GRANULOCYTES NFR BLD AUTO: 0.5 % (ref 0–0.5)
LDLC SERPL CALC-MCNC: 74.2 MG/DL (ref 63–159)
LYMPHOCYTES # BLD AUTO: 2.4 K/UL (ref 1–4.8)
LYMPHOCYTES NFR BLD: 31.9 % (ref 18–48)
MCH RBC QN AUTO: 32.1 PG (ref 27–31)
MCHC RBC AUTO-ENTMCNC: 35.4 G/DL (ref 32–36)
MCV RBC AUTO: 91 FL (ref 82–98)
MONOCYTES # BLD AUTO: 0.6 K/UL (ref 0.3–1)
MONOCYTES NFR BLD: 8.1 % (ref 4–15)
NEUTROPHILS # BLD AUTO: 4.3 K/UL (ref 1.8–7.7)
NEUTROPHILS NFR BLD: 57.7 % (ref 38–73)
NONHDLC SERPL-MCNC: 132 MG/DL
NRBC BLD-RTO: 0 /100 WBC
PLATELET # BLD AUTO: 159 K/UL (ref 150–450)
PMV BLD AUTO: 10.4 FL (ref 9.2–12.9)
POTASSIUM SERPL-SCNC: 4 MMOL/L (ref 3.5–5.1)
PROT SERPL-MCNC: 7 G/DL (ref 6–8.4)
RBC # BLD AUTO: 5.07 M/UL (ref 4.6–6.2)
SODIUM SERPL-SCNC: 138 MMOL/L (ref 136–145)
TRIGL SERPL-MCNC: 289 MG/DL (ref 30–150)
WBC # BLD AUTO: 7.4 K/UL (ref 3.9–12.7)

## 2022-10-05 PROCEDURE — 80053 COMPREHEN METABOLIC PANEL: CPT | Performed by: INTERNAL MEDICINE

## 2022-10-05 PROCEDURE — 36415 COLL VENOUS BLD VENIPUNCTURE: CPT | Mod: PO | Performed by: INTERNAL MEDICINE

## 2022-10-05 PROCEDURE — 80061 LIPID PANEL: CPT | Performed by: INTERNAL MEDICINE

## 2022-10-05 PROCEDURE — 85025 COMPLETE CBC W/AUTO DIFF WBC: CPT | Performed by: INTERNAL MEDICINE

## 2022-10-05 PROCEDURE — 83036 HEMOGLOBIN GLYCOSYLATED A1C: CPT | Performed by: INTERNAL MEDICINE

## 2022-10-28 ENCOUNTER — CLINICAL SUPPORT (OUTPATIENT)
Dept: ENDOSCOPY | Facility: HOSPITAL | Age: 52
End: 2022-10-28
Attending: INTERNAL MEDICINE
Payer: COMMERCIAL

## 2022-10-28 VITALS — WEIGHT: 200 LBS | HEIGHT: 69 IN | BODY MASS INDEX: 29.62 KG/M2

## 2022-10-28 DIAGNOSIS — Z12.11 SCREENING FOR COLON CANCER: ICD-10-CM

## 2022-10-28 RX ORDER — POLYETHYLENE GLYCOL 3350, SODIUM SULFATE ANHYDROUS, SODIUM BICARBONATE, SODIUM CHLORIDE, POTASSIUM CHLORIDE 236; 22.74; 6.74; 5.86; 2.97 G/4L; G/4L; G/4L; G/4L; G/4L
4 POWDER, FOR SOLUTION ORAL ONCE
Qty: 4000 ML | Refills: 0 | Status: SHIPPED | OUTPATIENT
Start: 2022-10-28 | End: 2022-10-28

## 2022-11-29 ENCOUNTER — HOSPITAL ENCOUNTER (OUTPATIENT)
Facility: HOSPITAL | Age: 52
Discharge: HOME | End: 2022-11-29
Attending: STUDENT IN AN ORGANIZED HEALTH CARE EDUCATION/TRAINING PROGRAM | Admitting: STUDENT IN AN ORGANIZED HEALTH CARE EDUCATION/TRAINING PROGRAM
Payer: COMMERCIAL

## 2022-11-29 ENCOUNTER — ANESTHESIA EVENT (OUTPATIENT)
Dept: ENDOSCOPY | Facility: HOSPITAL | Age: 52
End: 2022-11-29
Payer: COMMERCIAL

## 2022-11-29 ENCOUNTER — ANESTHESIA (OUTPATIENT)
Dept: ENDOSCOPY | Facility: HOSPITAL | Age: 52
End: 2022-11-29
Payer: COMMERCIAL

## 2022-11-29 VITALS
BODY MASS INDEX: 29.62 KG/M2 | SYSTOLIC BLOOD PRESSURE: 131 MMHG | WEIGHT: 200 LBS | TEMPERATURE: 98 F | HEART RATE: 70 BPM | HEIGHT: 69 IN | OXYGEN SATURATION: 100 % | DIASTOLIC BLOOD PRESSURE: 91 MMHG | RESPIRATION RATE: 20 BRPM

## 2022-11-29 DIAGNOSIS — Z12.11 COLON CANCER SCREENING: Primary | ICD-10-CM

## 2022-11-29 PROCEDURE — 88305 TISSUE EXAM BY PATHOLOGIST: CPT | Performed by: PATHOLOGY

## 2022-11-29 PROCEDURE — 63600175 PHARM REV CODE 636 W HCPCS: Performed by: NURSE ANESTHETIST, CERTIFIED REGISTERED

## 2022-11-29 PROCEDURE — 27201012 HC FORCEPS, HOT/COLD, DISP: Performed by: STUDENT IN AN ORGANIZED HEALTH CARE EDUCATION/TRAINING PROGRAM

## 2022-11-29 PROCEDURE — 45380 COLONOSCOPY AND BIOPSY: CPT | Mod: 33,,, | Performed by: STUDENT IN AN ORGANIZED HEALTH CARE EDUCATION/TRAINING PROGRAM

## 2022-11-29 PROCEDURE — 45380 PR COLONOSCOPY,BIOPSY: ICD-10-PCS | Mod: 33,,, | Performed by: STUDENT IN AN ORGANIZED HEALTH CARE EDUCATION/TRAINING PROGRAM

## 2022-11-29 PROCEDURE — 25000003 PHARM REV CODE 250: Performed by: NURSE ANESTHETIST, CERTIFIED REGISTERED

## 2022-11-29 PROCEDURE — 88305 TISSUE EXAM BY PATHOLOGIST: CPT | Mod: 26,,, | Performed by: PATHOLOGY

## 2022-11-29 PROCEDURE — 37000009 HC ANESTHESIA EA ADD 15 MINS: Performed by: STUDENT IN AN ORGANIZED HEALTH CARE EDUCATION/TRAINING PROGRAM

## 2022-11-29 PROCEDURE — 37000008 HC ANESTHESIA 1ST 15 MINUTES: Performed by: STUDENT IN AN ORGANIZED HEALTH CARE EDUCATION/TRAINING PROGRAM

## 2022-11-29 PROCEDURE — E9220 PRA ENDO ANESTHESIA: ICD-10-PCS | Mod: ,,, | Performed by: NURSE ANESTHETIST, CERTIFIED REGISTERED

## 2022-11-29 PROCEDURE — E9220 PRA ENDO ANESTHESIA: HCPCS | Mod: ,,, | Performed by: NURSE ANESTHETIST, CERTIFIED REGISTERED

## 2022-11-29 PROCEDURE — 45380 COLONOSCOPY AND BIOPSY: CPT | Mod: PT | Performed by: STUDENT IN AN ORGANIZED HEALTH CARE EDUCATION/TRAINING PROGRAM

## 2022-11-29 PROCEDURE — 88305 TISSUE EXAM BY PATHOLOGIST: ICD-10-PCS | Mod: 26,,, | Performed by: PATHOLOGY

## 2022-11-29 RX ORDER — SODIUM CHLORIDE 9 MG/ML
INJECTION, SOLUTION INTRAVENOUS CONTINUOUS
Status: DISCONTINUED | OUTPATIENT
Start: 2022-11-29 | End: 2022-11-29 | Stop reason: HOSPADM

## 2022-11-29 RX ORDER — PROPOFOL 10 MG/ML
VIAL (ML) INTRAVENOUS
Status: DISCONTINUED | OUTPATIENT
Start: 2022-11-29 | End: 2022-11-29

## 2022-11-29 RX ORDER — PROPOFOL 10 MG/ML
VIAL (ML) INTRAVENOUS CONTINUOUS PRN
Status: DISCONTINUED | OUTPATIENT
Start: 2022-11-29 | End: 2022-11-29

## 2022-11-29 RX ORDER — LIDOCAINE HYDROCHLORIDE 20 MG/ML
INJECTION INTRAVENOUS
Status: DISCONTINUED | OUTPATIENT
Start: 2022-11-29 | End: 2022-11-29

## 2022-11-29 RX ADMIN — LIDOCAINE HYDROCHLORIDE 20 MG: 20 INJECTION INTRAVENOUS at 12:11

## 2022-11-29 RX ADMIN — PROPOFOL 50 MG: 10 INJECTION, EMULSION INTRAVENOUS at 12:11

## 2022-11-29 RX ADMIN — Medication 150 MCG/KG/MIN: at 12:11

## 2022-11-29 RX ADMIN — PROPOFOL 30 MG: 10 INJECTION, EMULSION INTRAVENOUS at 12:11

## 2022-11-29 RX ADMIN — PROPOFOL 20 MG: 10 INJECTION, EMULSION INTRAVENOUS at 12:11

## 2022-11-29 RX ADMIN — SODIUM CHLORIDE: 0.9 INJECTION, SOLUTION INTRAVENOUS at 11:11

## 2022-11-29 NOTE — ANESTHESIA PREPROCEDURE EVALUATION
11/29/2022  Heraclio Lam is a 52 y.o., male.  Past Medical History:   Diagnosis Date    Arthritis      Past Surgical History:   Procedure Laterality Date    CSF SHUNT      EPIDURAL STEROID INJECTION N/A 12/4/2018    Procedure: Injection, Steroid, Epidural CERVICAL T1-2 INTERLAMINAR BATSHEVA;  Surgeon: Elba Juarez MD;  Location: [facility];  Service: Pain Management;  Laterality: N/A;    EPIDURAL STEROID INJECTION N/A 1/8/2019    Procedure: Injection, Steroid, Epidural CERVICAL T1-2 IL BATSHEVA;  Surgeon: Elba Juarez MD;  Location: [facility]IN [facility];  Service: Pain Management;  Laterality: N/A;    EPIDURAL STEROID INJECTION N/A 8/20/2020    Procedure: INJECTION, STEROID, EPIDURAL T1-2 IL BATSHEVA;  Surgeon: Elba Juarez MD;  Location: [facility]IN [facility];  Service: Pain Management;  Laterality: N/A;  T1-2 IL BATSHEVA   consent needed    JOINT REPLACEMENT  2006    disc Sx , cervical    SPINE SURGERY         Pre-op Assessment    I have reviewed the Patient Summary Reports.     I have reviewed the Nursing Notes. I have reviewed the NPO Status.   I have reviewed the Medications.     Review of Systems  Anesthesia Hx:  No problems with previous Anesthesia  Denies Family Hx of Anesthesia complications.   Denies Personal Hx of Anesthesia complications.   Hematology/Oncology:  Hematology Normal        Cardiovascular:  Cardiovascular Normal     Hepatic/GI:   Bowel Prep.    Musculoskeletal:   Arthritis     Neurological:   Neuromuscular Disease,    Dermatological:  Skin Normal        Physical Exam  General: Well nourished    Airway:  Mallampati: II   Mouth Opening: Normal  TM Distance: Normal  Tongue: Normal  Neck ROM: Normal ROM    Dental:  Intact        Anesthesia Plan  Type of Anesthesia, risks & benefits discussed:    Anesthesia Type: Gen Natural Airway  Intra-op Monitoring Plan: Standard ASA  Monitors  Informed Consent: Informed consent signed with the Patient and all parties understand the risks and agree with anesthesia plan.  All questions answered.   ASA Score: 2  Day of Surgery Review of History & Physical: H&P Update referred to the surgeon/provider.I have interviewed and examined the patient. I have reviewed the patient's H&P dated: There are no significant changes.     Ready For Surgery From Anesthesia Perspective.     .

## 2022-11-29 NOTE — H&P
Short Stay Endoscopy History and Physical    PCP - Louis Garay MD  Referring Physician - Louis Garay MD  [id number] [place] [address]    Procedure - Colonoscopy  ASA - per anesthesia  Mallampati - per anesthesia  History of Anesthesia problems - no  Family history Anesthesia problems -  no   Plan of anesthesia - General    HPI  52 y.o. male  Reason for procedure:   Screening for colon cancer [Z12.11]        ROS:  Constitutional: No fevers, chills, No weight loss  CV: No chest pain  Pulm: No cough, No shortness of breath  GI: see HPI    Medical History:  has a past medical history of Arthritis.    Surgical History:  has a past surgical history that includes CSF shunt; Joint replacement (2006); Epidural steroid injection (N/A, 12/4/2018); Epidural steroid injection (N/A, 1/8/2019); Spine surgery; and Epidural steroid injection (N/A, 8/20/2020).    Family History: family history includes Cancer (age of onset: 62) in his sister; Hypertension in his mother; Kidney failure in his father; Liver disease in his father..    Social History:  reports that he has never smoked. He has never used smokeless tobacco. He reports that he does not currently use alcohol. He reports that he does not use drugs.    Review of patient's allergies indicates:  No Known Allergies    Medications:   Medications Prior to Admission   Medication Sig Dispense Refill Last Dose    amitriptyline (ELAVIL) 25 MG tablet Take 1 tablet (25 mg total) by mouth nightly as needed for Insomnia. 30 tablet 3 Past Week    famotidine (PEPCID) 20 MG tablet Take 1 tablet (20 mg total) by mouth 2 (two) times daily. 180 tablet 3 Past Week    ibuprofen (ADVIL,MOTRIN) 200 MG tablet Take 200 mg by mouth every 6 (six) hours as needed for Pain.   Past Week       Physical Exam:    Vital Signs:   Vitals:    11/29/22 1107   BP: (!) 154/92   Pulse: 81   Resp: 15   Temp: 97.5 °F (36.4 °C)       General Appearance: Well appearing in no acute  distress  Abdomen: Soft, non tender, non distended with normal bowel sounds, no masses    Labs:  Lab Results   Component Value Date    WBC 7.40 10/05/2022    HGB 16.3 10/05/2022    HCT 46.0 10/05/2022     10/05/2022    CHOL 164 10/05/2022    TRIG 289 (H) 10/05/2022    HDL 32 (L) 10/05/2022    ALT 27 10/05/2022    AST 18 10/05/2022     10/05/2022    K 4.0 10/05/2022     10/05/2022    CREATININE 0.9 10/05/2022    BUN 17 10/05/2022    CO2 27 10/05/2022    HGBA1C 5.3 10/05/2022       I have explained the risks and benefits of this endoscopic procedure to the patient including but not limited to bleeding, inflammation, infection, perforation, and death.      Cody Valle MD

## 2022-11-29 NOTE — ANESTHESIA POSTPROCEDURE EVALUATION
Anesthesia Post Evaluation    Patient: Heraclio Lam    Procedure(s) Performed: Procedure(s) (LRB):  COLONOSCOPY (N/A)    Final Anesthesia Type: general      Patient location during evaluation: GI PACU  Patient participation: Yes- Able to Participate  Level of consciousness: awake and alert and awake  Post-procedure vital signs: reviewed and stable  Pain management: adequate  Airway patency: patent  ARACELY mitigation strategies: Postoperative administration of CPAP, nasopharyngeal airway, or oral appliance in the postanesthesia care unit (PACU)  PONV status at discharge: No PONV  Anesthetic complications: no      Cardiovascular status: blood pressure returned to baseline  Respiratory status: spontaneous ventilation, unassisted and room air  Hydration status: euvolemic  Follow-up not needed.          Vitals Value Taken Time   /91 11/29/22 1245   Temp 36.5 °C (97.7 °F) 11/29/22 1225   Pulse 70 11/29/22 1245   Resp 20 11/29/22 1245   SpO2 100 % 11/29/22 1245         No case tracking events are documented in the log.      Pain/Dee Score: Dee Score: 10 (11/29/2022 12:35 PM)

## 2022-11-29 NOTE — PROVATION PATIENT INSTRUCTIONS
Discharge Summary/Instructions after an Endoscopic Procedure  Patient Name: Heraclio Lam  Patient MRN: [id number]  Patient YOB: 1970 Tuesday, November 29, 2022  Cody Valle MD  Dear patient,  As a result of recent federal legislation (The Federal Cures Act), you may   receive lab or pathology results from your procedure in your [facility]   account before your physician is able to contact you. Your physician or   their representative will relay the results to you with their   recommendations at their soonest availability.  Thank you,  RESTRICTIONS:  During your procedure today, you received medications for sedation.  These   medications may affect your judgment, balance and coordination.  Therefore,   for 24 hours, you have the following restrictions:   - DO NOT drive a car, operate machinery, make legal/financial decisions,   sign important papers or drink alcohol.    ACTIVITY:  Today: no heavy lifting, straining or running due to procedural   sedation/anesthesia.  The following day: return to full activity including work.  DIET:  Eat and drink normally unless instructed otherwise.     TREATMENT FOR COMMON SIDE EFFECTS:  - Mild abdominal pain, nausea, belching, bloating or excessive gas:  rest,   eat lightly and use a heating pad.  - Sore Throat: treat with throat lozenges and/or gargle with warm salt   water.  - Because air was used during the procedure, expelling large amounts of air   from your rectum or belching is normal.  - If a bowel prep was taken, you may not have a bowel movement for 1-3 days.    This is normal.  SYMPTOMS TO WATCH FOR AND REPORT TO YOUR PHYSICIAN:  1. Abdominal pain or bloating, other than gas cramps.  2. Chest pain.  3. Back pain.  4. Signs of infection such as: chills or fever occurring within 24 hours   after the procedure.  5. Rectal bleeding, which would show as bright red, maroon, or black stools.   (A tablespoon of blood from the rectum is not serious,  especially if   hemorrhoids are present.)  6. Vomiting.  7. Weakness or dizziness.  GO DIRECTLY TO THE NEAREST EMERGENCY ROOM IF YOU HAVE ANY OF THE FOLLOWING:      Difficulty breathing              Chills and/or fever over 101 F   Persistent vomiting and/or vomiting blood   Severe abdominal pain   Severe chest pain   Black, tarry stools   Bleeding- more than one tablespoon   Any other symptom or condition that you feel may need urgent attention  Your doctor recommends these additional instructions:  If any biopsies were taken, your doctors clinic will contact you in 1 to 2   weeks with any results.  - Discharge patient to home.   - Await pathology results.   - Repeat colonoscopy in 7 years for surveillance.   - Patient has a contact number available for emergencies.  The signs and   symptoms of potential delayed complications were discussed with the   patient.  Return to normal activities tomorrow.  Written discharge   instructions were provided to the patient.  For questions, problems or results please call your physician - Cody Valle MD at Work:  [phone].  [facility], EMERGENCY ROOM PHONE NUMBER: [phone]  IF A COMPLICATION OR EMERGENCY SITUATION ARISES AND YOU ARE UNABLE TO REACH   YOUR PHYSICIAN - GO DIRECTLY TO THE EMERGENCY ROOM.  Cody Valle MD  11/29/2022 12:21:21 PM  This report has been verified and signed electronically.  Dear patient,  As a result of recent federal legislation (The Federal Cures Act), you may   receive lab or pathology results from your procedure in your [facility]   account before your physician is able to contact you. Your physician or   their representative will relay the results to you with their   recommendations at their soonest availability.  Thank you,  PROVATION

## 2022-11-29 NOTE — TRANSFER OF CARE
"Anesthesia Transfer of Care Note    Patient: Heraclio Lam    Procedure(s) Performed: Procedure(s) (LRB):  COLONOSCOPY (N/A)    Patient location: GI    Anesthesia Type: general    Transport from OR: Transported from OR on room air with adequate spontaneous ventilation    Post pain: adequate analgesia    Post assessment: no apparent anesthetic complications and tolerated procedure well    Post vital signs: stable    Level of consciousness: awake, alert and oriented    Nausea/Vomiting: no nausea/vomiting    Complications: none    Transfer of care protocol was followed      Last vitals:   Visit Vitals  BP (!) 154/92   Pulse 81   Temp 36.4 °C (97.5 °F)   Resp 15   Ht 5' 9" (1.753 m)   Wt 90.7 kg (200 lb)   SpO2 100%   BMI 29.53 kg/m²     "

## 2022-12-08 LAB
FINAL PATHOLOGIC DIAGNOSIS: NORMAL
GROSS: NORMAL
Lab: NORMAL

## 2023-01-31 ENCOUNTER — IMMUNIZATION (OUTPATIENT)
Dept: OBSTETRICS AND GYNECOLOGY | Facility: CLINIC | Age: 53
End: 2023-01-31
Payer: COMMERCIAL

## 2023-01-31 DIAGNOSIS — Z23 NEED FOR VACCINATION: Primary | ICD-10-CM

## 2023-01-31 PROCEDURE — 91312 COVID-19, MRNA, LNP-S, BIVALENT BOOSTER, PF, 30 MCG/0.3 ML DOSE: ICD-10-PCS | Mod: S$GLB,,, | Performed by: FAMILY MEDICINE

## 2023-01-31 PROCEDURE — 91312 COVID-19, MRNA, LNP-S, BIVALENT BOOSTER, PF, 30 MCG/0.3 ML DOSE: CPT | Mod: S$GLB,,, | Performed by: FAMILY MEDICINE

## 2023-01-31 PROCEDURE — 0124A COVID-19, MRNA, LNP-S, BIVALENT BOOSTER, PF, 30 MCG/0.3 ML DOSE: CPT | Mod: PBBFAC | Performed by: FAMILY MEDICINE

## 2023-03-03 ENCOUNTER — OFFICE VISIT (OUTPATIENT)
Dept: INTERNAL MEDICINE | Facility: CLINIC | Age: 53
End: 2023-03-03
Payer: COMMERCIAL

## 2023-03-03 VITALS
DIASTOLIC BLOOD PRESSURE: 88 MMHG | OXYGEN SATURATION: 99 % | WEIGHT: 202 LBS | HEIGHT: 69 IN | SYSTOLIC BLOOD PRESSURE: 138 MMHG | BODY MASS INDEX: 29.92 KG/M2 | HEART RATE: 62 BPM

## 2023-03-03 DIAGNOSIS — B35.1 ONYCHOMYCOSIS: ICD-10-CM

## 2023-03-03 DIAGNOSIS — Z00.00 ANNUAL PHYSICAL EXAM: Primary | ICD-10-CM

## 2023-03-03 DIAGNOSIS — R51.9 CHRONIC DAILY HEADACHE: ICD-10-CM

## 2023-03-03 DIAGNOSIS — M21.41 PES PLANUS OF BOTH FEET: ICD-10-CM

## 2023-03-03 DIAGNOSIS — M21.42 PES PLANUS OF BOTH FEET: ICD-10-CM

## 2023-03-03 PROCEDURE — 3075F PR MOST RECENT SYSTOLIC BLOOD PRESS GE 130-139MM HG: ICD-10-PCS | Mod: CPTII,S$GLB,, | Performed by: INTERNAL MEDICINE

## 2023-03-03 PROCEDURE — 1159F MED LIST DOCD IN RCRD: CPT | Mod: CPTII,S$GLB,, | Performed by: INTERNAL MEDICINE

## 2023-03-03 PROCEDURE — 3008F PR BODY MASS INDEX (BMI) DOCUMENTED: ICD-10-PCS | Mod: CPTII,S$GLB,, | Performed by: INTERNAL MEDICINE

## 2023-03-03 PROCEDURE — 99396 PR PREVENTIVE VISIT,EST,40-64: ICD-10-PCS | Mod: S$GLB,,, | Performed by: INTERNAL MEDICINE

## 2023-03-03 PROCEDURE — 1159F PR MEDICATION LIST DOCUMENTED IN MEDICAL RECORD: ICD-10-PCS | Mod: CPTII,S$GLB,, | Performed by: INTERNAL MEDICINE

## 2023-03-03 PROCEDURE — 3008F BODY MASS INDEX DOCD: CPT | Mod: CPTII,S$GLB,, | Performed by: INTERNAL MEDICINE

## 2023-03-03 PROCEDURE — 1160F PR REVIEW ALL MEDS BY PRESCRIBER/CLIN PHARMACIST DOCUMENTED: ICD-10-PCS | Mod: CPTII,S$GLB,, | Performed by: INTERNAL MEDICINE

## 2023-03-03 PROCEDURE — 3079F PR MOST RECENT DIASTOLIC BLOOD PRESSURE 80-89 MM HG: ICD-10-PCS | Mod: CPTII,S$GLB,, | Performed by: INTERNAL MEDICINE

## 2023-03-03 PROCEDURE — 1160F RVW MEDS BY RX/DR IN RCRD: CPT | Mod: CPTII,S$GLB,, | Performed by: INTERNAL MEDICINE

## 2023-03-03 PROCEDURE — 99999 PR PBB SHADOW E&M-EST. PATIENT-LVL V: ICD-10-PCS | Mod: PBBFAC,,, | Performed by: INTERNAL MEDICINE

## 2023-03-03 PROCEDURE — 99999 PR PBB SHADOW E&M-EST. PATIENT-LVL V: CPT | Mod: PBBFAC,,, | Performed by: INTERNAL MEDICINE

## 2023-03-03 PROCEDURE — 99396 PREV VISIT EST AGE 40-64: CPT | Mod: S$GLB,,, | Performed by: INTERNAL MEDICINE

## 2023-03-03 PROCEDURE — 3079F DIAST BP 80-89 MM HG: CPT | Mod: CPTII,S$GLB,, | Performed by: INTERNAL MEDICINE

## 2023-03-03 PROCEDURE — 3075F SYST BP GE 130 - 139MM HG: CPT | Mod: CPTII,S$GLB,, | Performed by: INTERNAL MEDICINE

## 2023-03-03 RX ORDER — AMITRIPTYLINE HYDROCHLORIDE 25 MG/1
25 TABLET, FILM COATED ORAL NIGHTLY PRN
Qty: 30 TABLET | Refills: 3 | Status: SHIPPED | OUTPATIENT
Start: 2023-03-03 | End: 2024-03-14

## 2023-03-03 RX ORDER — TERBINAFINE HYDROCHLORIDE 250 MG/1
250 TABLET ORAL DAILY
Qty: 30 TABLET | Refills: 0 | Status: SHIPPED | OUTPATIENT
Start: 2023-03-03 | End: 2023-04-02

## 2023-03-03 NOTE — PROGRESS NOTES
"    CHIEF COMPLAINT     Chief Complaint   Patient presents with    medical concerns        HPI     Heraclio Lam is a 52 y.o. male w/w/ Cervical Post laminectomy syndrome, GERD here today for annual visit      Since last visit s/p colonoscopy.    BL ankle pain R foot. Has been flat footed whole life pain with prolonged use of feet also has tinea infection on toes and feet    Personally Reviewed Patient's Medical, surgical, family and social hx. Changes updated in [facility].  Care Team updated in Epic    Review of Systems:  Review of Systems   Musculoskeletal:  Positive for arthralgias.   Skin:  Positive for rash.     Health Maintenance:   Reviewed with patient  Due for the following:      PHYSICAL EXAM     /88   Pulse 62   Ht 5' 9" (1.753 m)   Wt 91.6 kg (202 lb)   SpO2 99%   BMI 29.83 kg/m²     Gen: Well Appearing, NAD  HEENT: PERR, EOMI  Neck: FROM, no thyromegaly, no cervical adenopathy  CVD: RRR, no M/R/G  Pulm: Normal work of breathing, CTAB, no wheezing  Abd:  Soft, NT, ND non TTP, no mass  MSK: no LE edema  Neuro: A&Ox3, gait normal, speech normal  Mood; Mood normal, behavior normal, thought process linear   Feet; onychomycosis BL feet, pes planus    LABS     Labs reviewed; ordered    ASSESSMENT     1. Annual physical exam  CBC Auto Differential    Comprehensive Metabolic Panel    Hemoglobin A1C    Lipid Panel    PSA, SCREENING      2. Chronic daily headache  amitriptyline (ELAVIL) 25 MG tablet      3. Onychomycosis  terbinafine HCL (LAMISIL) 250 mg tablet    HEPATIC FUNCTION PANEL      4. Pes planus of both feet  X-Ray Foot AP Bilateral    Ambulatory referral/consult to Podiatry              Plan     Heraclio Lam is a 52 y.o. male with Cervical Post laminectomy syndrome, GERD   1. Annual physical exam  Updated problem list, medical history, care team and discussed HM.     - CBC Auto Differential; Future  - Comprehensive Metabolic Panel; Future  - Hemoglobin A1C; Future  - Lipid " Panel; Future  - PSA, SCREENING; Future    2. Chronic daily headache  Continue amitriptyline  - amitriptyline (ELAVIL) 25 MG tablet; Take 1 tablet (25 mg total) by mouth nightly as needed for Insomnia.  Dispense: 30 tablet; Refill: 3    3. Onychomycosis  Will treat onychomycosis with Lamisil oral.  This will treat both tinea pedis and tinea occurs will need to  - terbinafine HCL (LAMISIL) 250 mg tablet; Take 1 tablet (250 mg total) by mouth once daily.  Dispense: 30 tablet; Refill: 0  - HEPATIC FUNCTION PANEL; Future    4. Pes planus of both feet check LFTs skilled nursing through treatment  Interested in evaluation for orthotics since he is having pain with ambulation  - X-Ray Foot AP Bilateral; Future  - Ambulatory referral/consult to Podiatry; Future      Louis Garay MD

## 2023-03-06 ENCOUNTER — LAB VISIT (OUTPATIENT)
Dept: LAB | Facility: HOSPITAL | Age: 53
End: 2023-03-06
Attending: INTERNAL MEDICINE
Payer: COMMERCIAL

## 2023-03-06 DIAGNOSIS — B35.1 ONYCHOMYCOSIS: ICD-10-CM

## 2023-03-06 DIAGNOSIS — Z00.00 ANNUAL PHYSICAL EXAM: ICD-10-CM

## 2023-03-06 LAB
ALBUMIN SERPL BCP-MCNC: 4.2 G/DL (ref 3.5–5.2)
ALBUMIN SERPL BCP-MCNC: 4.2 G/DL (ref 3.5–5.2)
ALP SERPL-CCNC: 62 U/L (ref 55–135)
ALP SERPL-CCNC: 62 U/L (ref 55–135)
ALT SERPL W/O P-5'-P-CCNC: 48 U/L (ref 10–44)
ALT SERPL W/O P-5'-P-CCNC: 48 U/L (ref 10–44)
ANION GAP SERPL CALC-SCNC: 9 MMOL/L (ref 8–16)
AST SERPL-CCNC: 22 U/L (ref 10–40)
AST SERPL-CCNC: 22 U/L (ref 10–40)
BASOPHILS # BLD AUTO: 0.01 K/UL (ref 0–0.2)
BASOPHILS NFR BLD: 0.2 % (ref 0–1.9)
BILIRUB DIRECT SERPL-MCNC: 0.1 MG/DL (ref 0.1–0.3)
BILIRUB SERPL-MCNC: 0.5 MG/DL (ref 0.1–1)
BILIRUB SERPL-MCNC: 0.5 MG/DL (ref 0.1–1)
BUN SERPL-MCNC: 22 MG/DL (ref 6–20)
CALCIUM SERPL-MCNC: 9.3 MG/DL (ref 8.7–10.5)
CHLORIDE SERPL-SCNC: 103 MMOL/L (ref 95–110)
CHOLEST SERPL-MCNC: 185 MG/DL (ref 120–199)
CHOLEST/HDLC SERPL: 7.1 {RATIO} (ref 2–5)
CO2 SERPL-SCNC: 26 MMOL/L (ref 23–29)
COMPLEXED PSA SERPL-MCNC: 0.39 NG/ML (ref 0–4)
CREAT SERPL-MCNC: 1 MG/DL (ref 0.5–1.4)
DIFFERENTIAL METHOD: ABNORMAL
EOSINOPHIL # BLD AUTO: 0.1 K/UL (ref 0–0.5)
EOSINOPHIL NFR BLD: 0.8 % (ref 0–8)
ERYTHROCYTE [DISTWIDTH] IN BLOOD BY AUTOMATED COUNT: 12.1 % (ref 11.5–14.5)
EST. GFR  (NO RACE VARIABLE): >60 ML/MIN/1.73 M^2
ESTIMATED AVG GLUCOSE: 100 MG/DL (ref 68–131)
GLUCOSE SERPL-MCNC: 108 MG/DL (ref 70–110)
HBA1C MFR BLD: 5.1 % (ref 4–5.6)
HCT VFR BLD AUTO: 44.7 % (ref 40–54)
HDLC SERPL-MCNC: 26 MG/DL (ref 40–75)
HDLC SERPL: 14.1 % (ref 20–50)
HGB BLD-MCNC: 15.5 G/DL (ref 14–18)
IMM GRANULOCYTES # BLD AUTO: 0.03 K/UL (ref 0–0.04)
IMM GRANULOCYTES NFR BLD AUTO: 0.5 % (ref 0–0.5)
LDLC SERPL CALC-MCNC: ABNORMAL MG/DL (ref 63–159)
LYMPHOCYTES # BLD AUTO: 2.9 K/UL (ref 1–4.8)
LYMPHOCYTES NFR BLD: 43.8 % (ref 18–48)
MCH RBC QN AUTO: 31.3 PG (ref 27–31)
MCHC RBC AUTO-ENTMCNC: 34.7 G/DL (ref 32–36)
MCV RBC AUTO: 90 FL (ref 82–98)
MONOCYTES # BLD AUTO: 0.6 K/UL (ref 0.3–1)
MONOCYTES NFR BLD: 9.3 % (ref 4–15)
NEUTROPHILS # BLD AUTO: 3 K/UL (ref 1.8–7.7)
NEUTROPHILS NFR BLD: 45.4 % (ref 38–73)
NONHDLC SERPL-MCNC: 159 MG/DL
NRBC BLD-RTO: 0 /100 WBC
PLATELET # BLD AUTO: 175 K/UL (ref 150–450)
PMV BLD AUTO: 11.7 FL (ref 9.2–12.9)
POTASSIUM SERPL-SCNC: 3.8 MMOL/L (ref 3.5–5.1)
PROT SERPL-MCNC: 7.6 G/DL (ref 6–8.4)
PROT SERPL-MCNC: 7.6 G/DL (ref 6–8.4)
RBC # BLD AUTO: 4.96 M/UL (ref 4.6–6.2)
SODIUM SERPL-SCNC: 138 MMOL/L (ref 136–145)
TRIGL SERPL-MCNC: 714 MG/DL (ref 30–150)
WBC # BLD AUTO: 6.53 K/UL (ref 3.9–12.7)

## 2023-03-06 PROCEDURE — 84153 ASSAY OF PSA TOTAL: CPT | Performed by: INTERNAL MEDICINE

## 2023-03-06 PROCEDURE — 83036 HEMOGLOBIN GLYCOSYLATED A1C: CPT | Performed by: INTERNAL MEDICINE

## 2023-03-06 PROCEDURE — 80061 LIPID PANEL: CPT | Performed by: INTERNAL MEDICINE

## 2023-03-06 PROCEDURE — 36415 COLL VENOUS BLD VENIPUNCTURE: CPT | Mod: PO | Performed by: INTERNAL MEDICINE

## 2023-03-06 PROCEDURE — 80053 COMPREHEN METABOLIC PANEL: CPT | Performed by: INTERNAL MEDICINE

## 2023-03-06 PROCEDURE — 85025 COMPLETE CBC W/AUTO DIFF WBC: CPT | Performed by: INTERNAL MEDICINE

## 2023-03-07 ENCOUNTER — TELEPHONE (OUTPATIENT)
Dept: INTERNAL MEDICINE | Facility: CLINIC | Age: 53
End: 2023-03-07
Payer: COMMERCIAL

## 2023-03-07 DIAGNOSIS — E78.5 HYPERLIPIDEMIA, UNSPECIFIED HYPERLIPIDEMIA TYPE: Primary | ICD-10-CM

## 2023-03-07 NOTE — TELEPHONE ENCOUNTER
Lm for pt- asking for a returned call to discuss the message below.    ----- Message from Louis Garay MD sent at 3/7/2023  8:11 AM CST -----  Triglycerides elevated. Would like to start rosuvastatin 20mg daily and repeat in 6ms.

## 2023-03-07 NOTE — TELEPHONE ENCOUNTER
Spoke to pt and he said he has been taking to Crestor for 3 months and he will speak to PCP when he comes to clinic later this month.

## 2023-03-08 ENCOUNTER — HOSPITAL ENCOUNTER (OUTPATIENT)
Dept: RADIOLOGY | Facility: HOSPITAL | Age: 53
Discharge: HOME | End: 2023-03-08
Attending: INTERNAL MEDICINE
Payer: COMMERCIAL

## 2023-03-08 DIAGNOSIS — M21.41 PES PLANUS OF BOTH FEET: ICD-10-CM

## 2023-03-08 DIAGNOSIS — M21.42 PES PLANUS OF BOTH FEET: ICD-10-CM

## 2023-03-08 PROCEDURE — 73620 X-RAY EXAM OF FOOT: CPT | Mod: TC,50

## 2023-03-08 PROCEDURE — 73620 XR FOOT AP BILAT: ICD-10-PCS | Mod: 26,50,, | Performed by: RADIOLOGY

## 2023-03-08 PROCEDURE — 73620 X-RAY EXAM OF FOOT: CPT | Mod: 26,50,, | Performed by: RADIOLOGY

## 2023-03-08 NOTE — TELEPHONE ENCOUNTER
----- Message from Louis Garay MD sent at 3/7/2023  8:11 AM CST -----  Triglycerides elevated. Would like to start rosuvastatin 20mg daily and repeat in 6ms.

## 2023-03-09 DIAGNOSIS — E78.5 HYPERLIPIDEMIA, UNSPECIFIED HYPERLIPIDEMIA TYPE: Primary | ICD-10-CM

## 2023-03-09 RX ORDER — ROSUVASTATIN CALCIUM 20 MG/1
20 TABLET, COATED ORAL DAILY
Qty: 90 EACH | Refills: 3 | Status: SHIPPED | OUTPATIENT
Start: 2023-03-09 | End: 2024-01-24

## 2023-06-24 ENCOUNTER — PATIENT MESSAGE (OUTPATIENT)
Dept: INTERNAL MEDICINE | Facility: CLINIC | Age: 53
End: 2023-06-24
Payer: COMMERCIAL

## 2024-01-04 ENCOUNTER — OFFICE VISIT (OUTPATIENT)
Dept: URGENT CARE | Facility: CLINIC | Age: 54
End: 2024-01-04
Payer: COMMERCIAL

## 2024-01-04 VITALS
SYSTOLIC BLOOD PRESSURE: 117 MMHG | DIASTOLIC BLOOD PRESSURE: 74 MMHG | HEIGHT: 69 IN | OXYGEN SATURATION: 97 % | RESPIRATION RATE: 18 BRPM | TEMPERATURE: 98 F | WEIGHT: 200 LBS | HEART RATE: 71 BPM | BODY MASS INDEX: 29.62 KG/M2

## 2024-01-04 DIAGNOSIS — R05.9 COUGH, UNSPECIFIED TYPE: ICD-10-CM

## 2024-01-04 DIAGNOSIS — H66.001 NON-RECURRENT ACUTE SUPPURATIVE OTITIS MEDIA OF RIGHT EAR WITHOUT SPONTANEOUS RUPTURE OF TYMPANIC MEMBRANE: Primary | ICD-10-CM

## 2024-01-04 LAB
CTP QC/QA: YES
SARS-COV-2 AG RESP QL IA.RAPID: NEGATIVE

## 2024-01-04 PROCEDURE — 99213 OFFICE O/P EST LOW 20 MIN: CPT | Mod: S$GLB,,,

## 2024-01-04 PROCEDURE — 87811 SARS-COV-2 COVID19 W/OPTIC: CPT | Mod: QW,S$GLB,,

## 2024-01-04 RX ORDER — BENZONATATE 200 MG/1
200 CAPSULE ORAL 3 TIMES DAILY PRN
Qty: 30 CAPSULE | Refills: 0 | Status: SHIPPED | OUTPATIENT
Start: 2024-01-04 | End: 2024-01-14

## 2024-01-04 RX ORDER — AMOXICILLIN AND CLAVULANATE POTASSIUM 875; 125 MG/1; MG/1
1 TABLET, FILM COATED ORAL EVERY 12 HOURS
Qty: 14 TABLET | Refills: 0 | Status: SHIPPED | OUTPATIENT
Start: 2024-01-04 | End: 2024-01-11

## 2024-01-04 NOTE — PATIENT INSTRUCTIONS
Please drink plenty of fluids.  Please get plenty of rest.  Please utilize saltwater gargles for sore throat.  Please utilize warm tea, and lemon for sore throat relief.  Please utilize over-the-counter throat numbing spray and lozenges for sore throat relief.    Please return here or go to the Emergency Department for any concerns or worsening of condition.    Please take TESSALON for cough.  Please take AUGMENTIN for EAR INFECTION. Please complete the full course of this antibiotics. Please take this antibiotic with food and a full glass of water.    If you do not have Hypertension or any history of palpitations, it is ok to take over the counter Sudafed or Mucinex D or Allegra-D or Claritin-D or Zyrtec-D.  If you do take one of the above, it is ok to combine that with plain over the counter Mucinex or Allegra or Claritin or Zyrtec.  If for example you are taking Zyrtec -D, you can combine that with Mucinex, but not Mucinex-D.  If you are taking Mucinex-D, you can combine that with plain Allegra or Claritin or Zyrtec.   If you do have Hypertension or palpitations, it is safe to take Coricidin HBP for relief of sinus symptoms.  We recommend you take over the counter Flonase (Fluticasone) or another nasally inhaled steroid unless you are already taking one.  Nasal irrigation with a saline spray or Netti Pot like device per their directions is also recommended.  If not allergic, please take over the counter Motrin (Ibuprofen) as directed for control of pain and/or fever.  Please follow up with your primary care doctor or specialist as needed.    If you  smoke, please stop smoking.

## 2024-01-04 NOTE — PROGRESS NOTES
"Subjective:      Patient ID: Heraclio Lam is a 53 y.o. male.    Vitals:  height is 5' 9" (1.753 m) and weight is 90.7 kg (200 lb). His oral temperature is 98.2 °F (36.8 °C). His blood pressure is 117/74 and his pulse is 71. His respiration is 18 and oxygen saturation is 97%.     Chief Complaint: Cough    Patient is present with 2 other family members from the same household. Patient states that they are having a productive cough (yellow/green). Patient states that his cough is worse at night Assocaited symptoms include right ear pain and congestion. Onset was 2 weeks ago. Patient has tried Robitussin and Delsym with mild relief. Patient denies fever, chills, CP, SOB, nausea, vomiting, abdominal pain.    Cough  This is a new problem. The current episode started 1 to 4 weeks ago (2 weeks ago). The problem has been unchanged. The problem occurs constantly. The cough is Productive of sputum. Associated symptoms include ear congestion and ear pain. Pertinent negatives include no chest pain, chills, fever, headaches, heartburn, hemoptysis, myalgias, nasal congestion, postnasal drip, rash, rhinorrhea, sore throat, shortness of breath, sweats, weight loss or wheezing. Nothing aggravates the symptoms. He has tried OTC cough suppressant for the symptoms. The treatment provided mild relief.     Constitution: Negative for chills and fever.   HENT:  Positive for ear pain. Negative for postnasal drip and sore throat.    Cardiovascular:  Negative for chest pain and sob on exertion.   Respiratory:  Positive for cough and sputum production. Negative for bloody sputum, shortness of breath and wheezing.    Gastrointestinal:  Negative for abdominal pain, nausea, vomiting, diarrhea and heartburn.   Musculoskeletal:  Negative for muscle ache.   Skin:  Negative for rash.   Neurological:  Negative for headaches.     Objective:     Physical Exam   Constitutional:  Non-toxic appearance. He does not appear ill. No distress.      " Comments:Patient is in no acute distress, patient is non-toxic appearing, patient is ox3, patient is answering question appropriately.   normal  HENT:   Head: Normocephalic.   Ears:   Right Ear: External ear and ear canal normal. Tympanic membrane is injected, erythematous and bulging. impacted cerumen  Left Ear: Tympanic membrane, external ear and ear canal normal.   Nose: Rhinorrhea and congestion present.   Mouth/Throat: No oropharyngeal exudate or posterior oropharyngeal erythema. Oropharynx is clear.   Cardiovascular: Normal rate, regular rhythm and normal heart sounds.   No murmur heard.Exam reveals no gallop and no friction rub.   Pulmonary/Chest: Effort normal and breath sounds normal. No stridor. No respiratory distress. He has no wheezes. He has no rhonchi. He has no rales. He exhibits no tenderness.         Comments: Patient is in no respiratory distress. Breath sounds even, unlabored, and clear to auscultation bilaterally. No accessory muscle usage. Patient able to speak in complete sentences with ease.    Abdominal: Normal appearance.   Lymphadenopathy:     He has cervical adenopathy.   Neurological: He is alert.   Skin: Skin is not diaphoretic.   Nursing note and vitals reviewed.    Results for orders placed or performed in visit on 01/04/24   SARS Coronavirus 2 Antigen, POCT Manual Read   Result Value Ref Range    SARS Coronavirus 2 Antigen Negative Negative     Acceptable Yes      Assessment:     1. Non-recurrent acute suppurative otitis media of right ear without spontaneous rupture of tympanic membrane    2. Cough, unspecified type      Plan:   Previous notes reviewed.  Vital signs reviewed.  Labs ordered. Labs reviewed.  Discussed otitis media of right ear and cough likely due to bronchitis, home care, tx options, and given follow up precautions.  Patient was briefed on my thought process and diagnosis.   Patient involved with the treatment plan and agreed to the plan.  Patient  informed on warning signs, patient understood warning signs and to go to urgent care or ER if warning signs appear.  Please excuse grammatical/spelling errors appreciated throughout this visit encounter for a remote dictation device was used during this encounter.    Patient Instructions   Please drink plenty of fluids.  Please get plenty of rest.  Please utilize saltwater gargles for sore throat.  Please utilize warm tea, and lemon for sore throat relief.  Please utilize over-the-counter throat numbing spray and lozenges for sore throat relief.    Please return here or go to the Emergency Department for any concerns or worsening of condition.    Please take TESSALON for cough.  Please take AUGMENTIN for EAR INFECTION. Please complete the full course of this antibiotics. Please take this antibiotic with food and a full glass of water.    If you do not have Hypertension or any history of palpitations, it is ok to take over the counter Sudafed or Mucinex D or Allegra-D or Claritin-D or Zyrtec-D.  If you do take one of the above, it is ok to combine that with plain over the counter Mucinex or Allegra or Claritin or Zyrtec.  If for example you are taking Zyrtec -D, you can combine that with Mucinex, but not Mucinex-D.  If you are taking Mucinex-D, you can combine that with plain Allegra or Claritin or Zyrtec.   If you do have Hypertension or palpitations, it is safe to take Coricidin HBP for relief of sinus symptoms.  We recommend you take over the counter Flonase (Fluticasone) or another nasally inhaled steroid unless you are already taking one.  Nasal irrigation with a saline spray or Netti Pot like device per their directions is also recommended.  If not allergic, please take over the counter Motrin (Ibuprofen) as directed for control of pain and/or fever.  Please follow up with your primary care doctor or specialist as needed.    If you  smoke, please stop smoking.    Non-recurrent acute suppurative otitis media of  right ear without spontaneous rupture of tympanic membrane  -     amoxicillin-clavulanate 875-125mg (AUGMENTIN) 875-125 mg per tablet; Take 1 tablet by mouth every 12 (twelve) hours. for 7 days  Dispense: 14 tablet; Refill: 0    Cough, unspecified type  -     SARS Coronavirus 2 Antigen, POCT Manual Read  -     benzonatate (TESSALON) 200 MG capsule; Take 1 capsule (200 mg total) by mouth 3 (three) times daily as needed for Cough.  Dispense: 30 capsule; Refill: 0      Mili Tineo PA-C

## 2024-01-29 ENCOUNTER — OFFICE VISIT (OUTPATIENT)
Dept: PAIN MEDICINE | Facility: CLINIC | Age: 54
End: 2024-01-29
Payer: COMMERCIAL

## 2024-01-29 VITALS
WEIGHT: 199.94 LBS | HEART RATE: 60 BPM | DIASTOLIC BLOOD PRESSURE: 86 MMHG | SYSTOLIC BLOOD PRESSURE: 150 MMHG | BODY MASS INDEX: 29.61 KG/M2 | HEIGHT: 69 IN

## 2024-01-29 DIAGNOSIS — M54.12 CERVICAL RADICULOPATHY: Primary | ICD-10-CM

## 2024-01-29 DIAGNOSIS — M54.16 LUMBAR RADICULOPATHY: ICD-10-CM

## 2024-01-29 DIAGNOSIS — M96.1 CERVICAL POST-LAMINECTOMY SYNDROME: ICD-10-CM

## 2024-01-29 PROCEDURE — 1159F MED LIST DOCD IN RCRD: CPT | Mod: CPTII,S$GLB,, | Performed by: EMERGENCY MEDICINE

## 2024-01-29 PROCEDURE — 99999 PR PBB SHADOW E&M-EST. PATIENT-LVL III: CPT | Mod: PBBFAC,,, | Performed by: EMERGENCY MEDICINE

## 2024-01-29 PROCEDURE — 3077F SYST BP >= 140 MM HG: CPT | Mod: CPTII,S$GLB,, | Performed by: EMERGENCY MEDICINE

## 2024-01-29 PROCEDURE — 3079F DIAST BP 80-89 MM HG: CPT | Mod: CPTII,S$GLB,, | Performed by: EMERGENCY MEDICINE

## 2024-01-29 PROCEDURE — 3008F BODY MASS INDEX DOCD: CPT | Mod: CPTII,S$GLB,, | Performed by: EMERGENCY MEDICINE

## 2024-01-29 PROCEDURE — 99204 OFFICE O/P NEW MOD 45 MIN: CPT | Mod: S$GLB,,, | Performed by: EMERGENCY MEDICINE

## 2024-01-29 RX ORDER — MELOXICAM 7.5 MG/1
7.5 TABLET ORAL DAILY PRN
Qty: 30 TABLET | Refills: 0 | Status: SHIPPED | OUTPATIENT
Start: 2024-01-29 | End: 2024-02-28

## 2024-01-29 RX ORDER — GABAPENTIN 300 MG/1
CAPSULE ORAL
Qty: 69 CAPSULE | Refills: 0 | Status: SHIPPED | OUTPATIENT
Start: 2024-01-29 | End: 2024-04-10 | Stop reason: SDUPTHER

## 2024-01-29 RX ORDER — METHOCARBAMOL 500 MG/1
500 TABLET, FILM COATED ORAL 3 TIMES DAILY PRN
Qty: 120 TABLET | Refills: 0 | Status: SHIPPED | OUTPATIENT
Start: 2024-01-29 | End: 2024-02-28

## 2024-01-29 RX ORDER — TIZANIDINE 4 MG/1
4 TABLET ORAL NIGHTLY PRN
Qty: 30 TABLET | Refills: 0 | Status: SHIPPED | OUTPATIENT
Start: 2024-01-29 | End: 2024-02-28

## 2024-01-29 NOTE — PROGRESS NOTES
This note was completed with dictation software and grammatical errors may exist.    PCP: Louis Garay MD    Chief Complaint   Patient presents with    Back Pain    Neck Pain        Original HISTORY OF PRESENT ILLNESS 01/29/24: Heraclio Lam is a 53 y.o. year old male patient who has a past medical history of Arthritis. Patient reports that he had an injury since 1999.     Original Pain Description:  The pain is located in the neck and is radiating to the shoulders . The pain is described as stabbing and described as a burning sensation. Exacerbating factors: Sitting, Laying, Bending, and Walking. Mitigating factors medications. Symptoms interfere with daily activity and sleeping. The patient feels like symptoms have been worsening. Patient denies significant motor weakness. Patient reports that his last BATSHEVA was in 2020, but that his symptoms are starting to come back.      Patient reports that he is having lower back pain and radiates to his feet posteriorly, described as a needle like sensation. No weakness. No permament loss of sensation. Negative red flags.     Original PAIN SCORES:  Best: Pain is 9  Current: Pain is 10  Worst: Pain is 10    Interval History 09/03/2020:   HPI: The patient has an audio visit today for follow up of neck pain.  He is s/p T1-2 IL BATSHEVA on 8/20/20 with 75% relief.  He still has some soreness from the procedure.  He still has some neck pain with intermittent burning in the arms.  He is able to move his head and neck better since the procedure.  He denies any recent respiratory symptoms.  He had updated imaging since previous visit as well.  His pain today is 4/10.    Interval History 07/29/20:  Patient reports that approximately 2 months ago he was laying on his sofa internus head in heard a crack in his neck.  Since that time, he has had worsening neck pain with radiation into his arms.  Additionally, he states that about 1 month ago he fell in his shower and landed onto  his right shoulder and neck.  He denies any new onset of weakness in his upper extremities.  He denies any bowel or bladder changes.  He denies any recent respiratory symptoms.  He continues to take gabapentin and states that he has been taking 900 mg daily which does cause some drowsiness but does give him some benefit.  He has not been evaluated since previously mentioned trauma and would like updated imaging.  His pain today is 10/10.    Interval History 8/14/2019:  The patient is here for follow up of neck and arm pain.  We have not seen the patient since January.  At that time, he was scheduled for cervical SCS trial.  Unfortunately, this was denied by his insurance.  He previously had benefit with ESIs but they stopped being very effective.  He has completed PT in th past with limited benefit.  He saw neurosurgery in 2017 and surgery was not recommended.  He is taking Gabapentin 300 mg- 4 pills per day (1200 mg daily).  He says that his pain has been effecting his ability and function at work.  He is asking about being on leave or disability.  His pain today is 10/10.  The patient denies any bowel or bladder incontinence or signs of saddle paresthesia.  The patient denies any major medical changes since last office visit.     Interval History 1/18/2019:  The patient returns for follow up.  He had T1-2 IL BATSHEVA on 1/8/19.  He is reporting minimal benefit.  Since the procedure, he feels as though he has had a metallic taste.  He states that this has happened before with injections.  He denies any new weakness.  He denies bowel or bladder incontinence.  He is scheduled for a cervical MRI on 1/23/19.  His pain today is 10/10.       Interval History 12/26/2018:  The patient presents for injection follow up.  He is s/p repeat T1-2 IL BATSHEVA on 12/4/18 with about 60% relief.  He is reporting pain to the neck and into the arms still.  Last year, he required 2 ESIs for significant benefit.  He would like to schedule a  repeat.  His pain today is 9/10.     Interval History 11/13/2018:  The patient returns for follow up of neck pain.  He previously was doing well after T1-T2 IL BATSHEVA x2 completed on 1/3/18.  He reports that he has been having increased pain for about one month.  The pain starts to the neck and radiates into both arms with associated numbness.  This is similar to previous pain.  He denies any new onset weakness.  He denies any b/b changes.  He has been taking Gabapentin 900 mg daily with limited benefit.  He has persistent numbness and burning.  His pain today is 10/10.     Interval History 4/26/2018:  The patient presents for follow up today.  He is still having benefit from previous ESIs.  He is happy with these results.  He continues to work and be active.  He increased Gabapentin to 900 mg at last OV which is helping.  His pain today is 0/10.     Interval History 1/25/2018:  The patient returns today for follow up of neck and arm pain.  He is s/p T1-T2 IL BATSHEVA x2 completed on 1/3/18 with 75% relief.  His pain is now mild.  He is still having numbness to his upper extremities and lower extremities.  He is currently taking Gabapentin 300 mg QHS.  His pain today is 0/10.     Initial encounter:  Heraclio Lam presents to the clinic for the evaluation of neck pain. The pain started years ago following picking up a heavy object and symptoms have been worsening.  Patient has a history of ACDF at C6-7 and a history of posterior laminectomy of the cervical spine at the same levels.  In addition to his bilateral upper extremity radicular symptoms he does have bilateral lower extremity radicular symptoms.  Patient has not been having any signs of myelopathy        1/29/2024     8:11 AM   Last 3 PDI Scores   Pain Disability Index (PDI) 63       6 weeks of Conservative therapy:  PT: Completed  Chiro:  HEP: Participating      Treatments / Medications: (Ice/Heat/NSAIDS/APAP/etc):  Elavil 25mg - not taking   Advil 800 -  not taking   Abhijit fontana - taking  Ember - stopped taking because felt wasn't working    Antiplatelets/Anticoagulants:  No    Interventional Pain Procedures: (Previous injections)  08/20/20 T1-T2 ILESI  05/01/19 Canceled SCS trial  01/08/2019 T1/2 ILESI  12/04/2018 T1/2 ILESI  01/03/2018 T1/2 ILESI  12/22/2017 T1/2 ILESI  CDF at C6-7    IMAGING:        Past Surgical History:   Procedure Laterality Date    COLONOSCOPY N/A 11/29/2022    Procedure: COLONOSCOPY;  Surgeon: Cody Valle MD;  Location: [facility] ENDO (4TH FLR);  Service: Endoscopy;  Laterality: N/A;  instructions via portal and mail -   11/22 pre call left message -     CSF SHUNT      EPIDURAL STEROID INJECTION N/A 12/4/2018    Procedure: Injection, Steroid, Epidural CERVICAL T1-2 INTERLAMINAR BATSHEVA;  Surgeon: Elba Juarez MD;  Location: [facility] PAIN MGT;  Service: Pain Management;  Laterality: N/A;    EPIDURAL STEROID INJECTION N/A 1/8/2019    Procedure: Injection, Steroid, Epidural CERVICAL T1-2 IL BATSHEVA;  Surgeon: Elba Juarez MD;  Location: [facility] PAIN MGT;  Service: Pain Management;  Laterality: N/A;    EPIDURAL STEROID INJECTION N/A 8/20/2020    Procedure: INJECTION, STEROID, EPIDURAL T1-2 IL BATSHEVA;  Surgeon: Elba Juarez MD;  Location: [facility] PAIN MGT;  Service: Pain Management;  Laterality: N/A;  T1-2 IL BATSHEVA   consent needed    JOINT REPLACEMENT  2006    disc Sx , cervical    SPINE SURGERY         Social History     Socioeconomic History    Marital status: Single   Occupational History    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 No chills. No fatigue. Denies weight loss. Denies weight gain.  Back / musculoskeletal / neuro : See HPI    VITALS:   Vitals:    01/29/24 0811   BP: (!) 150/86   Pulse: 60   Weight: 90.7 kg (199 lb 15.3 oz)   Height:  "5' 9" (1.753 m)     Body mass index is 29.53 kg/m².      1/29/2024     8:11 AM 1/18/2019     9:40 AM 12/26/2018     8:53 AM   Last 3 PDI Scores   Pain Disability Index (PDI) 63 40 41       PHYSICAL EXAM:   GENERAL: Well appearing, in no acute distress, alert and oriented x3.  PSYCH:  Mood and affect appropriate.  SKIN: Skin color, texture, turgor normal, no rashes or lesions.  HEENT:  Normocephalic, atraumatic. Cranial nerves grossly intact.  NECK:  Tight cervical paraspinal and trapezius muscles.  Spurling's when moves neck to the right.  Positive axial loading bilaterally.  PULM: No evidence of respiratory difficulty, symmetric chest rise.  GI:  Non-distended  BACK:  Limited range of motion due to pain.  No pain to palpation over the spinous processes. No pain to palpation over facet joints. There is no pain with palpation over the sacroiliac joints bilaterally.   EXTREMITIES: No deformities, edema, or skin discoloration.   MUSCULOSKELETAL: Shoulder, hip, and knee provocative maneuvers are negative. No atrophy is noted.  NEURO: Sensation is equal and appropriate bilaterally. Bilateral upper and lower extremity strength is normal and symmetric. Bilateral upper and lower extremity coordination and muscle stretch reflexes are physiologic and symmetric. Plantar response are downgoing. Straight leg raising in the supine position is negative to radicular pain.   GAIT: normal.      LABS:    Lab Results   Component Value Date    HGBA1C 5.1 03/06/2023       Lab Results   Component Value Date    WBC 6.53 03/06/2023    HGB 15.5 03/06/2023    HCT 44.7 03/06/2023    MCV 90 03/06/2023     03/06/2023       ASSESSMENT: 53 y.o. year old male with pain, consistent with:    Encounter Diagnoses   Name Primary?    Cervical radiculopathy Yes    Cervical post-laminectomy syndrome     Lumbar radiculopathy        DISCUSSION: Heraclio Lam is a patient disabled from work accident, prior patient of Dr. Juarez who comes to " us with mid thoracic pain that radiates to his shoulders and occ down to his legs bilaterally      PLAN:  - I have stressed the importance of physical activity and a home exercise plan to help with pain and improve health.  - Patient can continue with medications for now since they are providing benefits, using them appropriately, and without side effects.  - Counseled patient regarding the importance of activity modification and constant sleeping habits.  - Medications:Start Neurontin 300mg gradually to three times a day. Explained titration schedule with starting nightly and increase dose gradually to tolerance without adverse effects., Start Zanaflex 4mg TID prn to help with muscular symptoms. Explained titration schedule with starting nightly and increase dose gradually to tolerance without adverse effects. , and Start Robaxin 500mg every 6 hours to help with muscular symptoms.   - Therapy: Referral to Physical therapy for Cervical ROM, stretching, strengthening, and a home exercise program.Referral to Physical therapy for Lumbar stabilization, core strengthening, and a home exercise program.  - Imaging: Reviewed available imaging with patient and answered any questions they had regarding study.Order MRI of the cervical, thoracic and Lumbar spine as patient has had previous thoracic issues, cervical spine surgery and now with new lumbar symptoms.   - The patient's pathophysiology was explained in detail with reference to x-rays, models, other visual aids as appropriate.   - Follow up visit: return to clinic in 4-6        Karson Longoria MD  01/29/2024

## 2024-02-12 ENCOUNTER — HOSPITAL ENCOUNTER (OUTPATIENT)
Dept: RADIOLOGY | Facility: HOSPITAL | Age: 54
Discharge: HOME | End: 2024-02-12
Attending: EMERGENCY MEDICINE
Payer: COMMERCIAL

## 2024-02-12 DIAGNOSIS — M54.16 LUMBAR RADICULOPATHY: ICD-10-CM

## 2024-02-12 DIAGNOSIS — M96.1 CERVICAL POST-LAMINECTOMY SYNDROME: ICD-10-CM

## 2024-02-12 DIAGNOSIS — M54.12 CERVICAL RADICULOPATHY: ICD-10-CM

## 2024-02-12 PROCEDURE — 72141 MRI NECK SPINE W/O DYE: CPT | Mod: 26,,, | Performed by: RADIOLOGY

## 2024-02-12 PROCEDURE — 72148 MRI LUMBAR SPINE W/O DYE: CPT | Mod: TC

## 2024-02-12 PROCEDURE — 72148 MRI LUMBAR SPINE W/O DYE: CPT | Mod: 26,,, | Performed by: RADIOLOGY

## 2024-02-12 PROCEDURE — 72146 MRI CHEST SPINE W/O DYE: CPT | Mod: 26,,, | Performed by: RADIOLOGY

## 2024-02-28 ENCOUNTER — OFFICE VISIT (OUTPATIENT)
Dept: PAIN MEDICINE | Facility: CLINIC | Age: 54
End: 2024-02-28
Payer: COMMERCIAL

## 2024-02-28 VITALS
WEIGHT: 199.94 LBS | SYSTOLIC BLOOD PRESSURE: 147 MMHG | BODY MASS INDEX: 29.61 KG/M2 | HEIGHT: 69 IN | DIASTOLIC BLOOD PRESSURE: 85 MMHG | HEART RATE: 66 BPM

## 2024-02-28 DIAGNOSIS — M48.061 SPINAL STENOSIS OF LUMBAR REGION, UNSPECIFIED WHETHER NEUROGENIC CLAUDICATION PRESENT: ICD-10-CM

## 2024-02-28 DIAGNOSIS — M48.02 CERVICAL STENOSIS OF SPINAL CANAL: Primary | ICD-10-CM

## 2024-02-28 DIAGNOSIS — M54.12 CERVICAL RADICULOPATHY: ICD-10-CM

## 2024-02-28 DIAGNOSIS — M54.16 LUMBAR RADICULOPATHY: ICD-10-CM

## 2024-02-28 DIAGNOSIS — M48.04 THORACIC SPINAL STENOSIS: ICD-10-CM

## 2024-02-28 PROCEDURE — 99999 PR PBB SHADOW E&M-EST. PATIENT-LVL III: CPT | Mod: PBBFAC,,, | Performed by: EMERGENCY MEDICINE

## 2024-02-28 PROCEDURE — 99214 OFFICE O/P EST MOD 30 MIN: CPT | Mod: S$GLB,,, | Performed by: EMERGENCY MEDICINE

## 2024-02-28 PROCEDURE — 1159F MED LIST DOCD IN RCRD: CPT | Mod: CPTII,S$GLB,, | Performed by: EMERGENCY MEDICINE

## 2024-02-28 PROCEDURE — 3077F SYST BP >= 140 MM HG: CPT | Mod: CPTII,S$GLB,, | Performed by: EMERGENCY MEDICINE

## 2024-02-28 PROCEDURE — 3079F DIAST BP 80-89 MM HG: CPT | Mod: CPTII,S$GLB,, | Performed by: EMERGENCY MEDICINE

## 2024-02-28 PROCEDURE — 3008F BODY MASS INDEX DOCD: CPT | Mod: CPTII,S$GLB,, | Performed by: EMERGENCY MEDICINE

## 2024-02-28 NOTE — PROGRESS NOTES
Chronic Pain-Established (Follow up visit)       Interval History 02/28/24 :  Since their last visit the pain has been unchanged. Patient was started on gabapentin, Robaxin and Zanaflex during their last visit and they have not been taking it.  They were refer to physical therapy in are not participating in physical therapy as the patient states that he was not called.  No urinary or fecal incontinence. Patient reports saddle anesthesia that has been present since 1999.  Patient is still having cervical radiculopathy and bilateral lower lumbar radiculopathy.    Original HISTORY OF PRESENT ILLNESS 01/29/24: Heraclio Lam is a 53 y.o. year old male patient who has a past medical history of Arthritis. Patient reports that he had an injury since 1999.     Original Pain Description:  The pain is located in the neck and is radiating to the shoulders . The pain is described as stabbing and described as a burning sensation. Exacerbating factors: Sitting, Laying, Bending, and Walking. Mitigating factors medications. Symptoms interfere with daily activity and sleeping. The patient feels like symptoms have been worsening. Patient denies significant motor weakness. Patient reports that his last BATSHEVA was in 2020, but that his symptoms are starting to come back.      Patient reports that he is having lower back pain and radiates to his feet posteriorly, described as a needle like sensation. No weakness. No permament loss of sensation. Negative red flags.     Original PAIN SCORES:  Best: Pain is 9  Current: Pain is 10  Worst: Pain is 10    Interval History 09/03/2020:   HPI: The patient has an audio visit today for follow up of neck pain.  He is s/p T1-2 IL BATSHEVA on 8/20/20 with 75% relief.  He still has some soreness from the procedure.  He still has some neck pain with intermittent burning in the arms.  He is able to move his head and neck better since the procedure.  He denies any recent respiratory symptoms.  He had  updated imaging since previous visit as well.  His pain today is 4/10.    Interval History 07/29/20:  Patient reports that approximately 2 months ago he was laying on his sofa internus head in heard a crack in his neck.  Since that time, he has had worsening neck pain with radiation into his arms.  Additionally, he states that about 1 month ago he fell in his shower and landed onto his right shoulder and neck.  He denies any new onset of weakness in his upper extremities.  He denies any bowel or bladder changes.  He denies any recent respiratory symptoms.  He continues to take gabapentin and states that he has been taking 900 mg daily which does cause some drowsiness but does give him some benefit.  He has not been evaluated since previously mentioned trauma and would like updated imaging.  His pain today is 10/10.    Interval History 8/14/2019:  The patient is here for follow up of neck and arm pain.  We have not seen the patient since January.  At that time, he was scheduled for cervical SCS trial.  Unfortunately, this was denied by his insurance.  He previously had benefit with ESIs but they stopped being very effective.  He has completed PT in th past with limited benefit.  He saw neurosurgery in 2017 and surgery was not recommended.  He is taking Gabapentin 300 mg- 4 pills per day (1200 mg daily).  He says that his pain has been effecting his ability and function at work.  He is asking about being on leave or disability.  His pain today is 10/10.  The patient denies any bowel or bladder incontinence or signs of saddle paresthesia.  The patient denies any major medical changes since last office visit.     Interval History 1/18/2019:  The patient returns for follow up.  He had T1-2 IL BATSHEVA on 1/8/19.  He is reporting minimal benefit.  Since the procedure, he feels as though he has had a metallic taste.  He states that this has happened before with injections.  He denies any new weakness.  He denies bowel or  bladder incontinence.  He is scheduled for a cervical MRI on 1/23/19.  His pain today is 10/10.       Interval History 12/26/2018:  The patient presents for injection follow up.  He is s/p repeat T1-2 IL BATSHEVA on 12/4/18 with about 60% relief.  He is reporting pain to the neck and into the arms still.  Last year, he required 2 ESIs for significant benefit.  He would like to schedule a repeat.  His pain today is 9/10.     Interval History 11/13/2018:  The patient returns for follow up of neck pain.  He previously was doing well after T1-T2 IL BATSHEVA x2 completed on 1/3/18.  He reports that he has been having increased pain for about one month.  The pain starts to the neck and radiates into both arms with associated numbness.  This is similar to previous pain.  He denies any new onset weakness.  He denies any b/b changes.  He has been taking Gabapentin 900 mg daily with limited benefit.  He has persistent numbness and burning.  His pain today is 10/10.     Interval History 4/26/2018:  The patient presents for follow up today.  He is still having benefit from previous ESIs.  He is happy with these results.  He continues to work and be active.  He increased Gabapentin to 900 mg at last OV which is helping.  His pain today is 0/10.     Interval History 1/25/2018:  The patient returns today for follow up of neck and arm pain.  He is s/p T1-T2 IL BATSHEVA x2 completed on 1/3/18 with 75% relief.  His pain is now mild.  He is still having numbness to his upper extremities and lower extremities.  He is currently taking Gabapentin 300 mg QHS.  His pain today is 0/10.     Initial encounter:  Heraclio Lam presents to the clinic for the evaluation of neck pain. The pain started years ago following picking up a heavy object and symptoms have been worsening.  Patient has a history of ACDF at C6-7 and a history of posterior laminectomy of the cervical spine at the same levels.  In addition to his bilateral upper extremity radicular  symptoms he does have bilateral lower extremity radicular symptoms.  Patient has not been having any signs of myelopathy        2/28/2024     1:58 PM   Last 3 PDI Scores   Pain Disability Index (PDI) 70       6 weeks of Conservative therapy:  PT: Completed  Chiro:  HEP: Participating      Treatments / Medications: (Ice/Heat/NSAIDS/APAP/etc):  Elavil 25mg - not taking   Advil 800 - not taking   Abhijit fontana - taking  Ember 300mg TID  Robaxin 500mg TID for day  Tizandine 4mg qHS    Antiplatelets/Anticoagulants:  No    Interventional Pain Procedures: (Previous injections)  08/20/20 T1-T2 ILESI  05/01/19 Canceled SCS trial  01/08/2019 T1/2 ILESI  12/04/2018 T1/2 ILESI  01/03/2018 T1/2 ILESI  12/22/2017 T1/2 ILESI  CDF at C6-7    IMAGING:    MRI SPINE CERVICAL-THORACIC-LUMBAR WITHOUT CONTRAST (XPD)  02/12/2024  Persistent syrinx extending from C5 through C7-T1.  No associated cord edema or expansion.    C3-C4: 3 mm of retrolisthesis.  Disc osteophyte complex effaces ventral thecal sac and compresses ventral spinal cord.  Bilateral uncovertebral joint spurring and bilateral ligamentum flavum hypertrophy.  Moderate spinal canal and lateral recess stenosis.  Moderate to severe bilateral neural foraminal narrowing.  C4-C5: disc osteophyte complex partially effaces the ventral thecal sac.  Bilateral uncovertebral joint spurring and bilateral ligamentum flavum hypertrophy.  Mild spinal canal stenosis.  Mild right and mild-to-moderate left neural foraminal narrowing.  C5-C6: osteophyte complex partially effaces the ventral thecal sac.  Bilateral facet arthropathy.  Mild spinal canal stenosis.  Mild left neural foraminal narrowing.  C6-C7: postoperative change. Posterior disc osteophyte complex partially effaces the ventral thecal sac.  Left uncovertebral joint spurring.  No spinal canal stenosis.  Mild left neural foraminal narrowing.  C7-T1: Bilateral facet arthropathy.  No spinal canal stenosis.  Mild-to-moderate left neural  foraminal narrowing.     Thoracic spine:  T2-T3: disc osteophyte complex   Bilateral LF hypertrophy.  Mild spinal stenosis.  No neural foraminal narrowing.  T3-T4: disc osteophyte complex + left subarticular/foraminal disc osteophyte complex.  Moderate left lateral recess stenosis.  Mild left neural foraminal narrowing.  T4-T5: Left subarticular/foraminal disc osteophyte complex.  Moderate left lateral recess stenosis.  Mild left neural foraminal narrowing.  T8-T9: Central/right subarticular disc osteophyte protrusion effaces the ventral thecal sac and causes mass effect on the ventral spinal cord and right lateral recess.  Bilateral ligamentum flavum hypertrophy.  Moderate spinal right lateral recess stenosis.  No neural foraminal narrowing.  T10-T11: Circumferential disc osteophyte complex causes mass effect on the ventral thecal sac and left lateral recess.  Bilateral facet arthropathy and bilateral LF hypertrophy.  Mild-to-moderate spinal canal and left lateral recess stenosis.  Mild left neural foraminal narrowing  T11-T12: Left foraminal/extraforaminal disc osteophyte complex.  No spinal canal stenosis.  Mild left neural foraminal narrowing.    Lumbar spine:  L3-L4: Bilateral facet arthropathy and bilateral LF hypertrophy.  Moderate to severe spinal canal and lateral recess stenosis.  Mild right and mild-to-moderate left neural foraminal narrowing.  L4-L5: Bilateral facet arthropathy and bilateral LF hypertrophy.  Severe spinal canal and lateral recess stenosis.  Moderate to severe bilateral neural foraminal narrowing.  L5-S1: Posterior broad-based disc protrusion causes mass effect on the ventral thecal sac and lateral recesses.  No spinal canal stenosis.  No neural foraminal narrowing.        Past Surgical History:   Procedure Laterality Date    COLONOSCOPY N/A 11/29/2022    Procedure: COLONOSCOPY;  Surgeon: Cody Valle MD;  Location: [facility] ([address] [address]);  Service: Endoscopy;  Laterality: N/A;   "instructions via portal and mail -   11/22 pre call left message -     CSF SHUNT      EPIDURAL STEROID INJECTION N/A 12/4/2018    Procedure: Injection, Steroid, Epidural CERVICAL T1-2 INTERLAMINAR BATSHEVA;  Surgeon: Elba Juarez MD;  Location: [facility];  Service: Pain Management;  Laterality: N/A;    EPIDURAL STEROID INJECTION N/A 1/8/2019    Procedure: Injection, Steroid, Epidural CERVICAL T1-2 IL BATSHEVA;  Surgeon: Elba Juarez MD;  Location: [facility];  Service: Pain Management;  Laterality: N/A;    EPIDURAL STEROID INJECTION N/A 8/20/2020    Procedure: INJECTION, STEROID, EPIDURAL T1-2 IL BATSHEVA;  Surgeon: Elba Juarez MD;  Location: [facility];  Service: Pain Management;  Laterality: N/A;  T1-2 IL BATSHEVA   consent needed    JOINT REPLACEMENT  2006    disc Sx , cervical    SPINE SURGERY         Social History     Socioeconomic History    Marital status: Single   Occupational History    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 No chills. No fatigue. Denies weight loss. Denies weight gain.  Back / musculoskeletal / neuro : See HPI    VITALS:   Vitals:    02/28/24 1359   BP: (!) 147/85   Pulse: 66   Weight: 90.7 kg (199 lb 15.3 oz)   Height: 5' 9" (1.753 m)       Body mass index is 29.53 kg/m².      2/28/2024     1:58 PM 1/29/2024     8:11 AM 1/18/2019     9:40 AM   Last 3 PDI Scores   Pain Disability Index (PDI) 70 63 40       PHYSICAL EXAM:   GENERAL: Well appearing, in no acute distress, alert and oriented x3.  PSYCH:  Mood and affect appropriate.  SKIN: Skin color, texture, turgor normal, no rashes or lesions.  HEENT:  Normocephalic, atraumatic. Cranial nerves grossly intact.  NECK:  Tight cervical paraspinal and trapezius muscles.  Spurling's when moves neck to the right.  Positive axial " loading bilaterally.  PULM: No evidence of respiratory difficulty, symmetric chest rise.  GI:  Non-distended  BACK:  Limited range of motion due to pain.  No pain to palpation over the spinous processes. No pain to palpation over facet joints. There is no pain with palpation over the sacroiliac joints bilaterally.   EXTREMITIES: No deformities, edema, or skin discoloration.   MUSCULOSKELETAL: Shoulder, hip, and knee provocative maneuvers are negative. No atrophy is noted.  NEURO: Sensation is equal and appropriate bilaterally. Bilateral upper and lower extremity strength is normal and symmetric. Bilateral upper and lower extremity coordination and muscle stretch reflexes are physiologic and symmetric. Plantar response are downgoing. Straight leg raising in the supine position is negative to radicular pain.   GAIT: normal.      LABS:    Lab Results   Component Value Date    HGBA1C 5.1 03/06/2023       Lab Results   Component Value Date    WBC 6.53 03/06/2023    HGB 15.5 03/06/2023    HCT 44.7 03/06/2023    MCV 90 03/06/2023     03/06/2023       ASSESSMENT: 53 y.o. year old male with pain, consistent with:    Encounter Diagnoses   Name Primary?    Cervical stenosis of spinal canal Yes    Spinal stenosis of lumbar region, unspecified whether neurogenic claudication present     Thoracic spinal stenosis     Cervical radiculopathy     Lumbar radiculopathy          DISCUSSION: Heraclio Lam is a patient disabled from work accident, prior patient of Dr. Juarez who comes to us with mid thoracic pain that radiates to his shoulders and occ down to his legs bilaterally with noted multilevel cervical, thoracic and lumbar stenosis      PLAN:  - I have stressed the importance of physical activity and a home exercise plan to help with pain and improve health.  - Patient can continue with medications for now since they are providing benefits, using them appropriately, and without side effects.  - Counseled patient  regarding the importance of activity modification and constant sleeping habits.  - Medications:No refills required  - Imaging: Reviewed available imaging with patient and answered any questions they had regarding study.  - The patient's pathophysiology was explained in detail with reference to x-rays, models, other visual aids as appropriate.   -  discussed with patient that would refer him to ortho spine for their evaluation and recommendations before proceeding with epidural steroid injections  - Follow up visit: return to clinic in 4-6        Karson Longoria MD  02/28/2024

## 2024-02-28 NOTE — H&P (VIEW-ONLY)
Chronic Pain-Established (Follow up visit)       Interval History 02/28/24 :  Since their last visit the pain has been unchanged. Patient was started on gabapentin, Robaxin and Zanaflex during their last visit and they have not been taking it.  They were refer to physical therapy in are not participating in physical therapy as the patient states that he was not called.  No urinary or fecal incontinence. Patient reports saddle anesthesia that has been present since 1999.  Patient is still having cervical radiculopathy and bilateral lower lumbar radiculopathy.    Original HISTORY OF PRESENT ILLNESS 01/29/24: Heraclio Lam is a 53 y.o. year old male patient who has a past medical history of Arthritis. Patient reports that he had an injury since 1999.     Original Pain Description:  The pain is located in the neck and is radiating to the shoulders . The pain is described as stabbing and described as a burning sensation. Exacerbating factors: Sitting, Laying, Bending, and Walking. Mitigating factors medications. Symptoms interfere with daily activity and sleeping. The patient feels like symptoms have been worsening. Patient denies significant motor weakness. Patient reports that his last BATSHEVA was in 2020, but that his symptoms are starting to come back.      Patient reports that he is having lower back pain and radiates to his feet posteriorly, described as a needle like sensation. No weakness. No permament loss of sensation. Negative red flags.     Original PAIN SCORES:  Best: Pain is 9  Current: Pain is 10  Worst: Pain is 10    Interval History 09/03/2020:   HPI: The patient has an audio visit today for follow up of neck pain.  He is s/p T1-2 IL BATSHEVA on 8/20/20 with 75% relief.  He still has some soreness from the procedure.  He still has some neck pain with intermittent burning in the arms.  He is able to move his head and neck better since the procedure.  He denies any recent respiratory symptoms.  He had  updated imaging since previous visit as well.  His pain today is 4/10.    Interval History 07/29/20:  Patient reports that approximately 2 months ago he was laying on his sofa internus head in heard a crack in his neck.  Since that time, he has had worsening neck pain with radiation into his arms.  Additionally, he states that about 1 month ago he fell in his shower and landed onto his right shoulder and neck.  He denies any new onset of weakness in his upper extremities.  He denies any bowel or bladder changes.  He denies any recent respiratory symptoms.  He continues to take gabapentin and states that he has been taking 900 mg daily which does cause some drowsiness but does give him some benefit.  He has not been evaluated since previously mentioned trauma and would like updated imaging.  His pain today is 10/10.    Interval History 8/14/2019:  The patient is here for follow up of neck and arm pain.  We have not seen the patient since January.  At that time, he was scheduled for cervical SCS trial.  Unfortunately, this was denied by his insurance.  He previously had benefit with ESIs but they stopped being very effective.  He has completed PT in th past with limited benefit.  He saw neurosurgery in 2017 and surgery was not recommended.  He is taking Gabapentin 300 mg- 4 pills per day (1200 mg daily).  He says that his pain has been effecting his ability and function at work.  He is asking about being on leave or disability.  His pain today is 10/10.  The patient denies any bowel or bladder incontinence or signs of saddle paresthesia.  The patient denies any major medical changes since last office visit.     Interval History 1/18/2019:  The patient returns for follow up.  He had T1-2 IL BATSHEVA on 1/8/19.  He is reporting minimal benefit.  Since the procedure, he feels as though he has had a metallic taste.  He states that this has happened before with injections.  He denies any new weakness.  He denies bowel or  bladder incontinence.  He is scheduled for a cervical MRI on 1/23/19.  His pain today is 10/10.       Interval History 12/26/2018:  The patient presents for injection follow up.  He is s/p repeat T1-2 IL BATSHEVA on 12/4/18 with about 60% relief.  He is reporting pain to the neck and into the arms still.  Last year, he required 2 ESIs for significant benefit.  He would like to schedule a repeat.  His pain today is 9/10.     Interval History 11/13/2018:  The patient returns for follow up of neck pain.  He previously was doing well after T1-T2 IL BATSHEVA x2 completed on 1/3/18.  He reports that he has been having increased pain for about one month.  The pain starts to the neck and radiates into both arms with associated numbness.  This is similar to previous pain.  He denies any new onset weakness.  He denies any b/b changes.  He has been taking Gabapentin 900 mg daily with limited benefit.  He has persistent numbness and burning.  His pain today is 10/10.     Interval History 4/26/2018:  The patient presents for follow up today.  He is still having benefit from previous ESIs.  He is happy with these results.  He continues to work and be active.  He increased Gabapentin to 900 mg at last OV which is helping.  His pain today is 0/10.     Interval History 1/25/2018:  The patient returns today for follow up of neck and arm pain.  He is s/p T1-T2 IL BATSHEVA x2 completed on 1/3/18 with 75% relief.  His pain is now mild.  He is still having numbness to his upper extremities and lower extremities.  He is currently taking Gabapentin 300 mg QHS.  His pain today is 0/10.     Initial encounter:  Heraclio Lam presents to the clinic for the evaluation of neck pain. The pain started years ago following picking up a heavy object and symptoms have been worsening.  Patient has a history of ACDF at C6-7 and a history of posterior laminectomy of the cervical spine at the same levels.  In addition to his bilateral upper extremity radicular  symptoms he does have bilateral lower extremity radicular symptoms.  Patient has not been having any signs of myelopathy        2/28/2024     1:58 PM   Last 3 PDI Scores   Pain Disability Index (PDI) 70       6 weeks of Conservative therapy:  PT: Completed  Chiro:  HEP: Participating      Treatments / Medications: (Ice/Heat/NSAIDS/APAP/etc):  Elavil 25mg - not taking   Advil 800 - not taking   Abhijit fontana - taking  Ember 300mg TID  Robaxin 500mg TID for day  Tizandine 4mg qHS    Antiplatelets/Anticoagulants:  No    Interventional Pain Procedures: (Previous injections)  08/20/20 T1-T2 ILESI  05/01/19 Canceled SCS trial  01/08/2019 T1/2 ILESI  12/04/2018 T1/2 ILESI  01/03/2018 T1/2 ILESI  12/22/2017 T1/2 ILESI  CDF at C6-7    IMAGING:    MRI SPINE CERVICAL-THORACIC-LUMBAR WITHOUT CONTRAST (XPD)  02/12/2024  Persistent syrinx extending from C5 through C7-T1.  No associated cord edema or expansion.    C3-C4: 3 mm of retrolisthesis.  Disc osteophyte complex effaces ventral thecal sac and compresses ventral spinal cord.  Bilateral uncovertebral joint spurring and bilateral ligamentum flavum hypertrophy.  Moderate spinal canal and lateral recess stenosis.  Moderate to severe bilateral neural foraminal narrowing.  C4-C5: disc osteophyte complex partially effaces the ventral thecal sac.  Bilateral uncovertebral joint spurring and bilateral ligamentum flavum hypertrophy.  Mild spinal canal stenosis.  Mild right and mild-to-moderate left neural foraminal narrowing.  C5-C6: osteophyte complex partially effaces the ventral thecal sac.  Bilateral facet arthropathy.  Mild spinal canal stenosis.  Mild left neural foraminal narrowing.  C6-C7: postoperative change. Posterior disc osteophyte complex partially effaces the ventral thecal sac.  Left uncovertebral joint spurring.  No spinal canal stenosis.  Mild left neural foraminal narrowing.  C7-T1: Bilateral facet arthropathy.  No spinal canal stenosis.  Mild-to-moderate left neural  foraminal narrowing.     Thoracic spine:  T2-T3: disc osteophyte complex   Bilateral LF hypertrophy.  Mild spinal stenosis.  No neural foraminal narrowing.  T3-T4: disc osteophyte complex + left subarticular/foraminal disc osteophyte complex.  Moderate left lateral recess stenosis.  Mild left neural foraminal narrowing.  T4-T5: Left subarticular/foraminal disc osteophyte complex.  Moderate left lateral recess stenosis.  Mild left neural foraminal narrowing.  T8-T9: Central/right subarticular disc osteophyte protrusion effaces the ventral thecal sac and causes mass effect on the ventral spinal cord and right lateral recess.  Bilateral ligamentum flavum hypertrophy.  Moderate spinal right lateral recess stenosis.  No neural foraminal narrowing.  T10-T11: Circumferential disc osteophyte complex causes mass effect on the ventral thecal sac and left lateral recess.  Bilateral facet arthropathy and bilateral LF hypertrophy.  Mild-to-moderate spinal canal and left lateral recess stenosis.  Mild left neural foraminal narrowing  T11-T12: Left foraminal/extraforaminal disc osteophyte complex.  No spinal canal stenosis.  Mild left neural foraminal narrowing.    Lumbar spine:  L3-L4: Bilateral facet arthropathy and bilateral LF hypertrophy.  Moderate to severe spinal canal and lateral recess stenosis.  Mild right and mild-to-moderate left neural foraminal narrowing.  L4-L5: Bilateral facet arthropathy and bilateral LF hypertrophy.  Severe spinal canal and lateral recess stenosis.  Moderate to severe bilateral neural foraminal narrowing.  L5-S1: Posterior broad-based disc protrusion causes mass effect on the ventral thecal sac and lateral recesses.  No spinal canal stenosis.  No neural foraminal narrowing.        Past Surgical History:   Procedure Laterality Date    COLONOSCOPY N/A 11/29/2022    Procedure: COLONOSCOPY;  Surgeon: Cody Valle MD;  Location: [facility] ([address] [place] [address]);  Service: Endoscopy;  Laterality: N/A;   "instructions via portal and mail -   11/22 pre call left message -     CSF SHUNT      EPIDURAL STEROID INJECTION N/A 12/4/2018    Procedure: Injection, Steroid, Epidural CERVICAL T1-2 INTERLAMINAR BATSHEVA;  Surgeon: Elba Juarez MD;  Location: [facility];  Service: Pain Management;  Laterality: N/A;    EPIDURAL STEROID INJECTION N/A 1/8/2019    Procedure: Injection, Steroid, Epidural CERVICAL T1-2 IL BATSHEVA;  Surgeon: Elba Juarez MD;  Location: [facility];  Service: Pain Management;  Laterality: N/A;    EPIDURAL STEROID INJECTION N/A 8/20/2020    Procedure: INJECTION, STEROID, EPIDURAL T1-2 IL BATSHEVA;  Surgeon: Elba Juarez MD;  Location: [facility];  Service: Pain Management;  Laterality: N/A;  T1-2 IL BATSHEVA   consent needed    JOINT REPLACEMENT  2006    disc Sx , cervical    SPINE SURGERY         Social History     Socioeconomic History    Marital status: Single   Occupational History    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 No chills. No fatigue. Denies weight loss. Denies weight gain.  Back / musculoskeletal / neuro : See HPI    VITALS:   Vitals:    02/28/24 1359   BP: (!) 147/85   Pulse: 66   Weight: 90.7 kg (199 lb 15.3 oz)   Height: 5' 9" (1.753 m)       Body mass index is 29.53 kg/m².      2/28/2024     1:58 PM 1/29/2024     8:11 AM 1/18/2019     9:40 AM   Last 3 PDI Scores   Pain Disability Index (PDI) 70 63 40       PHYSICAL EXAM:   GENERAL: Well appearing, in no acute distress, alert and oriented x3.  PSYCH:  Mood and affect appropriate.  SKIN: Skin color, texture, turgor normal, no rashes or lesions.  HEENT:  Normocephalic, atraumatic. Cranial nerves grossly intact.  NECK:  Tight cervical paraspinal and trapezius muscles.  Spurling's when moves neck to the right.  Positive axial " loading bilaterally.  PULM: No evidence of respiratory difficulty, symmetric chest rise.  GI:  Non-distended  BACK:  Limited range of motion due to pain.  No pain to palpation over the spinous processes. No pain to palpation over facet joints. There is no pain with palpation over the sacroiliac joints bilaterally.   EXTREMITIES: No deformities, edema, or skin discoloration.   MUSCULOSKELETAL: Shoulder, hip, and knee provocative maneuvers are negative. No atrophy is noted.  NEURO: Sensation is equal and appropriate bilaterally. Bilateral upper and lower extremity strength is normal and symmetric. Bilateral upper and lower extremity coordination and muscle stretch reflexes are physiologic and symmetric. Plantar response are downgoing. Straight leg raising in the supine position is negative to radicular pain.   GAIT: normal.      LABS:    Lab Results   Component Value Date    HGBA1C 5.1 03/06/2023       Lab Results   Component Value Date    WBC 6.53 03/06/2023    HGB 15.5 03/06/2023    HCT 44.7 03/06/2023    MCV 90 03/06/2023     03/06/2023       ASSESSMENT: 53 y.o. year old male with pain, consistent with:    Encounter Diagnoses   Name Primary?    Cervical stenosis of spinal canal Yes    Spinal stenosis of lumbar region, unspecified whether neurogenic claudication present     Thoracic spinal stenosis     Cervical radiculopathy     Lumbar radiculopathy          DISCUSSION: Heraclio Lam is a patient disabled from work accident, prior patient of Dr. Juarez who comes to us with mid thoracic pain that radiates to his shoulders and occ down to his legs bilaterally with noted multilevel cervical, thoracic and lumbar stenosis      PLAN:  - I have stressed the importance of physical activity and a home exercise plan to help with pain and improve health.  - Patient can continue with medications for now since they are providing benefits, using them appropriately, and without side effects.  - Counseled patient  regarding the importance of activity modification and constant sleeping habits.  - Medications:No refills required  - Imaging: Reviewed available imaging with patient and answered any questions they had regarding study.  - The patient's pathophysiology was explained in detail with reference to x-rays, models, other visual aids as appropriate.   -  discussed with patient that would refer him to ortho spine for their evaluation and recommendations before proceeding with epidural steroid injections  - Follow up visit: return to clinic in 4-6        Karson Longoria MD  02/28/2024

## 2024-03-06 ENCOUNTER — TELEPHONE (OUTPATIENT)
Dept: PAIN MEDICINE | Facility: CLINIC | Age: 54
End: 2024-03-06
Payer: COMMERCIAL

## 2024-03-06 ENCOUNTER — OFFICE VISIT (OUTPATIENT)
Dept: ORTHOPEDICS | Facility: CLINIC | Age: 54
End: 2024-03-06
Payer: COMMERCIAL

## 2024-03-06 VITALS — BODY MASS INDEX: 29.61 KG/M2 | WEIGHT: 199.94 LBS | HEIGHT: 69 IN

## 2024-03-06 DIAGNOSIS — M54.16 LUMBAR RADICULOPATHY: Primary | ICD-10-CM

## 2024-03-06 DIAGNOSIS — M51.37 DDD (DEGENERATIVE DISC DISEASE), LUMBOSACRAL: ICD-10-CM

## 2024-03-06 DIAGNOSIS — M48.04 THORACIC SPINAL STENOSIS: ICD-10-CM

## 2024-03-06 DIAGNOSIS — M48.02 CERVICAL STENOSIS OF SPINAL CANAL: ICD-10-CM

## 2024-03-06 DIAGNOSIS — M48.061 SPINAL STENOSIS OF LUMBAR REGION, UNSPECIFIED WHETHER NEUROGENIC CLAUDICATION PRESENT: ICD-10-CM

## 2024-03-06 PROCEDURE — 99214 OFFICE O/P EST MOD 30 MIN: CPT | Mod: S$GLB,,, | Performed by: ORTHOPAEDIC SURGERY

## 2024-03-06 PROCEDURE — 1159F MED LIST DOCD IN RCRD: CPT | Mod: CPTII,S$GLB,, | Performed by: ORTHOPAEDIC SURGERY

## 2024-03-06 PROCEDURE — 3008F BODY MASS INDEX DOCD: CPT | Mod: CPTII,S$GLB,, | Performed by: ORTHOPAEDIC SURGERY

## 2024-03-06 PROCEDURE — 99999 PR PBB SHADOW E&M-EST. PATIENT-LVL III: CPT | Mod: PBBFAC,,, | Performed by: ORTHOPAEDIC SURGERY

## 2024-03-06 NOTE — TELEPHONE ENCOUNTER
----- Message from Karson Longoria MD sent at 3/6/2024  9:48 AM CST -----  Regarding: Order for ELPIDIO LOCKETT    Patient Name: ELPIDIO LOCKETT([id number])  Sex: Male  : 1970      PCP: TRISH CAMERON    Center: [facility] CENTRAL BILLING OFFICE     Types of orders made on 2024: Procedure Request    Order Date:3/6/2024  Ordering User:KARSON LONGORIA [id number]]  Encounter Provider:Karson Longoria MD [[id number]]  Aut  horizing Provider: Karson Longoria MD [id number]]  Department:[facility][[id number]]    Common Order Information  Procedure -> Transforaminal Injection (Specify level and laterality) Cmt:             bilateral L4-5    Order Specific Information  Order: Procedure Order to Pain Management [Custom: [id number]]  Order #:          [id number]: 1 FUTURE    Priority: Routine  Class: Clinic Performed      desi Order Information      Expires on:2025            Expected by:2024                   Comment:Ordered by [facility] / ortho spine if the patient asks    Associated Diagnoses      M54.16 Lumbar radiculopathy      M51.37 DDD (degenerative disc disease), lumbosacral      Physician -> eric         Facility Name: -> [facility]           Priority: Routine  Class: Clinic Performed    Future Order I  nformation      Expires on:2025            Expected by:2024                   Comment:Ordered by [facility] / ortho spine if the patient asks    Associated Diagnoses      M54.16 Lumbar radiculopathy      M51.37 DDD (degenerative disc disease), lumbosacral      Procedure -> Transforaminal Injection (Specify level and laterality) Cmt:                 bilateral L4-5        Physician -> eric         Facility Name: -  > [facility]

## 2024-03-06 NOTE — PROGRESS NOTES
"DATE: 3/6/2024  PATIENT: Heraclio Lam    Supervising Physician: Cosme Chaidez M.D.    CHIEF COMPLAINT: neck, low back, bilateral arm and leg pain    HISTORY:  Heraclio Lam is a 53 y.o. male sp C6-7 ACDF here for initial evaluation of neck and bilateral arm pain (Neck - 10, Arm - 10). The pain has been present for years, worsening over time. The patient describes the pain as sharp and shooting from his neck to his shoulders. The pain is worse with turning left and improved by nothing. There is positive associated numbness and tingling. There is positive subjective weakness. Pt says in 1999 he had a "shunt put into his spinal cord to drain fluid".  He also had a C6-7 ACDF in 2006. Since then he has had continued pain. He has tried medication, PT and multiple ESIs and is miserable.    Pt also has chronic low back and bilateral leg pain with burning, numbness, and tingling. He has tried medication and PT for 8 weeks in the last 6 months, but no ESIs.    The patient ENDORSES myelopathic symptoms such as handwriting changes or difficulty with buttons/coins/keys. ENDORSES balance problems. Denies perineal paresthesias, bowel/bladder dysfunction.    Interventional Pain Procedures: (Previous injections)  08/20/20 T1-T2 ILESI  05/01/19 Canceled SCS trial  01/08/2019 T1/2 ILESI  12/04/2018 T1/2 ILESI  01/03/2018 T1/2 ILESI  12/22/2017 T1/2 ILESI    PAST MEDICAL/SURGICAL HISTORY:  Past Medical History:   Diagnosis Date    Arthritis      Past Surgical History:   Procedure Laterality Date    COLONOSCOPY N/A 11/29/2022    Procedure: COLONOSCOPY;  Surgeon: Cody aVlle MD;  Location: [facility] ([address] [place] [address]);  Service: Endoscopy;  Laterality: N/A;  instructions via portal and mail -   11/22 pre call left message -     CSF SHUNT      EPIDURAL STEROID INJECTION N/A 12/4/2018    Procedure: Injection, Steroid, Epidural CERVICAL T1-2 INTERLAMINAR BATSHEVA;  Surgeon: Elba Juarez MD;  Location: [facility];  " Service: Pain Management;  Laterality: N/A;    EPIDURAL STEROID INJECTION N/A 1/8/2019    Procedure: Injection, Steroid, Epidural CERVICAL T1-2 IL BATSHEVA;  Surgeon: Elba Juarez MD;  Location: [facility];  Service: Pain Management;  Laterality: N/A;    EPIDURAL STEROID INJECTION N/A 8/20/2020    Procedure: INJECTION, STEROID, EPIDURAL T1-2 IL BATSHEVA;  Surgeon: Elba Juarez MD;  Location: [facility];  Service: Pain Management;  Laterality: N/A;  T1-2 IL BATSHEVA   consent needed    JOINT REPLACEMENT  2006    disc Sx , cervical    SPINE SURGERY         Medications:  Current Outpatient Medications on File Prior to Visit   Medication Sig Dispense Refill    amitriptyline (ELAVIL) 25 MG tablet Take 1 tablet (25 mg total) by mouth nightly as needed for Insomnia. 30 tablet 3    famotidine (PEPCID) 20 MG tablet Take 1 tablet (20 mg total) by mouth 2 (two) times daily. 180 tablet 3    gabapentin (NEURONTIN) 300 MG capsule Take 1 capsule (300 mg total) by mouth every evening for 7 days, THEN 1 capsule (300 mg total) 2 (two) times daily for 7 days, THEN 1 capsule (300 mg total) 3 (three) times daily for 16 days. 69 capsule 0    ibuprofen (ADVIL,MOTRIN) 200 MG tablet Take 200 mg by mouth every 6 (six) hours as needed for Pain.      rosuvastatin (CRESTOR) 20 MG tablet TAKE 1 TABLET BY MOUTH ONCE  DAILY 90 tablet 0     No current facility-administered medications on file prior to visit.       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   Tobacco Use    Smoking status: Never    Smokeless tobacco: Never   Substance and Sexual Activity    Alcohol use: Not Currently    Drug use: No     Social Determinants of Health     Financial Resource Strain: Low Risk  (3/3/2024)    Overall Financial Resource Strain (CARDIA)     Difficulty of Paying Living Expenses: Not hard at all   Food Insecurity: No Food Insecurity (3/3/2024)    Hunger Vital  Sign     Worried About Running Out of Food in the Last Year: Never true     Ran Out of Food in the Last Year: Never true   Transportation Needs: No Transportation Needs (3/3/2024)    PRAPARE - Transportation     Lack of Transportation (Medical): No     Lack of Transportation (Non-Medical): No   Physical Activity: Insufficiently Active (3/3/2024)    Exercise Vital Sign     Days of Exercise per Week: 7 days     Minutes of Exercise per Session: 20 min   Stress: Stress Concern Present (3/3/2024)    [employer] - Occupational Stress Questionnaire     Feeling of Stress : Very much   Social Connections: Unknown (3/3/2024)    Social Connection and Isolation Panel [NHANES]     Frequency of Communication with Friends and Family: More than three times a week     Frequency of Social Gatherings with Friends and Family: More than three times a week     Active Member of Clubs or Organizations: No     Attends Club or Organization Meetings: Never     Marital Status: Never    Housing Stability: Low Risk  (3/3/2024)    Housing Stability Vital Sign     Unable to Pay for Housing in the Last Year: No     Number of Places Lived in the Last Year: 1     Unstable Housing in the Last Year: No       REVIEW OF SYSTEMS:  Constitution: Negative. Negative for chills, fever and night sweats.   Cardiovascular: Negative for chest pain and syncope.   Respiratory: Negative for cough and shortness of breath.   Gastrointestinal: See HPI. Negative for nausea/vomiting. Negative for abdominal pain.  Genitourinary: See HPI. Negative for discoloration or dysuria.  Skin: Negative for dry skin, itching and rash.   Hematologic/Lymphatic: Negative for bleeding problem. Does not bruise/bleed easily.   Musculoskeletal: Negative for falls and muscle weakness.   Neurological: See HPI. No seizures.   Endocrine: Negative for polydipsia, polyphagia and polyuria.   Allergic/Immunologic: Negative for hives and persistent  "infections.  Psychiatric/Behavioral: Negative for depression and insomnia.         EXAM:  Ht 5' 9" (1.753 m)   Wt 90.7 kg (199 lb 15.3 oz)   BMI 29.53 kg/m²     General: The patient is a very pleasant 53 y.o. male in no apparent distress, the patient is oriented to person, place and time.  Psych: Normal mood and affect  HEENT: Vision grossly intact, hearing intact to the spoken word.  Lungs: Respirations unlabored.  Gait: Normal station and gait, no difficulty with toe or heel walk.   Skin: Cervical skin negative for rashes, lesions, hairy patches and surgical scars.  Range of motion: Cervical range of motion is acceptable. There is mild tenderness to palpation.  Spinal Balance: Global saggital and coronal spinal balance acceptable, no significant for scoliosis and kyphosis.  Musculoskeletal: No pain with the range of motion of the bilateral shoulders and elbows. Normal bulk and contour of the bilateral hands.  Vascular: Bilateral hands warm and well perfused, radial pulses 2+ bilaterally.  Neurological: Normal strength and tone in all major motor groups in the bilateral upper and lower extremities. Altered sensation to light touch in the C5-T1 and L2-S1 dermatomes bilaterally.  Deep tendon reflexes symmetric 2+ in the bilateral upper and lower extremities.  Negative Inverted Radial Reflex and Lopez's bilaterally. Negative Babinski bilaterally.     IMAGING:   Today I personally reviewed AP, Lat and Flex/Ex  upright C-spine films (2020) that demonstrate ACDF at C6-7, moderate degenerative changes at C3-4.    MRI cervical demonstrates moderate spinal canal and lateral recess stenosis and moderate to severe bilateral neural foraminal narrowing at C3-C4. Persistent syrinx extending from C5 through C7-T1.     MRI thoracic demonstrates moderate spinal canal and right lateral recess stenosis at T8-T9 with associated mass effect on the spinal cord.     MRI lumbar demonstrates moderate to severe spinal canal and lateral " recess stenosis at L3-L4 and L4-L5. Moderate to severe neural foraminal narrowing at L4-L5.     Body mass index is 29.53 kg/m².    Hemoglobin A1C   Date Value Ref Range Status   03/06/2023 5.1 4.0 - 5.6 % Final     Comment:     ADA Screening Guidelines:  5.7-6.4%  Consistent with prediabetes  >or=6.5%  Consistent with diabetes    High levels of fetal hemoglobin interfere with the HbA1C  assay. Heterozygous hemoglobin variants (HbS, HgC, etc)do  not significantly interfere with this assay.   However, presence of multiple variants may affect accuracy.     10/05/2022 5.3 4.0 - 5.6 % Final     Comment:     ADA Screening Guidelines:  5.7-6.4%  Consistent with prediabetes  >or=6.5%  Consistent with diabetes    High levels of fetal hemoglobin interfere with the HbA1C  assay. Heterozygous hemoglobin variants (HbS, HgC, etc)do  not significantly interfere with this assay.   However, presence of multiple variants may affect accuracy.     06/22/2021 5.2 4.0 - 5.6 % Final     Comment:     ADA Screening Guidelines:  5.7-6.4%  Consistent with prediabetes  >or=6.5%  Consistent with diabetes    High levels of fetal hemoglobin interfere with the HbA1C  assay. Heterozygous hemoglobin variants (HbS, HgC, etc)do  not significantly interfere with this assay.   However, presence of multiple variants may affect accuracy.             ASSESSMENT/PLAN:    Diagnoses and all orders for this visit:    Cervical stenosis of spinal canal  -     Ambulatory referral/consult to Orthopedics  -     X-Ray Cervical Spine AP Lat with Flex Ex; Future  -     CT Cervical Spine Without Contrast; Future    Spinal stenosis of lumbar region, unspecified whether neurogenic claudication present  -     Ambulatory referral/consult to Orthopedics    Thoracic spinal stenosis  -     Ambulatory referral/consult to Orthopedics      Today we discussed at length all of the different treatment options including anti-inflammatories, acetaminophen, rest, ice, heat, physical  therapy including strengthening and stretching exercises, home exercises, ROM, aerobic conditioning, aqua therapy, other modalities including ultrasound, massage, and dry needling, epidural steroid injections and finally surgical intervention.      Pt presents with chronic degenerative changes in cervical, thoracic, and lumbar spine with cervical and lumbar radiculopathy. Also presents with symptoms concerning for myelopathy. Failure of conservative rx with neck. Will schedule xray and CT and have pt fu with Dr. Chaidez to discuss surgical options. Will order bilateral L4-5 TFESI with pain management for lumbar radiculopathy.

## 2024-03-07 ENCOUNTER — TELEPHONE (OUTPATIENT)
Dept: PAIN MEDICINE | Facility: CLINIC | Age: 54
End: 2024-03-07
Payer: COMMERCIAL

## 2024-03-13 NOTE — PRE-PROCEDURE INSTRUCTIONS
Patient reviewed on 3/13/2024.  Okay to proceed at [place]. The following pre-procedure instructions and arrival time have been reviewed with patient via phone and sent to patient portal for review.  Patient verbalized an understanding.  Pt to be accompanied by his wife day of procedure as responsible .     Dear Heraclio,     You are scheduled for a procedure with Dr. Longoria on 03/14/2024  Your scheduled arrival time is 10:40 am.  This arrival time is roughly 1 hour before your anticipated procedure time to allow sufficient time for pre-op..  Please wear comfortable clothes.  Most patients do not need to change into a gown.  Please do not wear a dress.  This procedure will take place at the [facility] at the corner of [facility].  It is in the [place] [facility] next to [facility].  The address is:     [address] [place] [address].  [place] [address]     After entering the building, you will proceed to the second floor where you can check in with registration. You should take any medications that you routinely take for blood pressure, heart medications, thyroid, cholesterol, etc.      The fasting restrictions are dependent on whether or not you are receiving sedation.  Sedation is not available for all procedures.      Your fasting instructions are as follow:  IV sedation. You should not eat for 8 hours and can only drink clear liquids (water or black coffee without cream/sugar) up until 2 hours before your scheduled time.  You CANNOT drive yourself and must have a .     If you are on blood thinners, you need to follow the anticoagulation instructions that had been discussed previously.  You should only stop the blood thinners if it was approved by your primary care physician or your cardiologist.  In the event that you are not able to stop your blood thinners, a blood thinner was not listed on your medication list, or we were not able to get clearance from  your cardiologist, then the procedure may have to be postponed/canceled.      IF you were told to stop your blood thinners, this is how long you should generally hold some of the more common ones.  Remember that stopping blood thinners is only necessary for certain procedures. If you are unsure of your instructions, please call us.   Aspirin - 5 days  Plavix/Clopidogrel - 7 days  Warfarin / Coumadin - 5 days  Eliquis - 3 days  Pradaxa/Dabigatran - 4 days  Xarelto/Rivaroxaban - 3 days     If you are a diabetic, do not take your medication if you will be fasting, but bring it with you. Please plan on being here for roughly 2 hours.     Please call us if you have been sick (running fever, having any flu-like symptoms) or have been taking antibiotics in the past 2 weeks or had any outpatient procedures other than with us (colonoscopy, endoscopy, OBGYN, dental, etc.). If you have been previously COVID positive, you will need to hold off on your procedure until you are symptom free for 10 days. If you did not have any symptoms, you can have your procedure 10 days from your positive test result.       *HOLD ALL VITAMINS, MINERALS, HERBS (INCLUDING HERBAL TEAS) AND SUPPLEMENTS  *SHOWER WITH ANTIBACTERIAL SOAP (EX. DIAL) NIGHT BEFORE AND MORNING OF PROCEDURE  *DO NOT APPLY ANY LOTIONS, OILS, POWDERS, PERFUME/COLOGNE, OINTMENTS, GELS, CREAMS, MAKEUP OR DEODORANT TO YOUR SKIN MORNING OF PROCEDURE  *LEAVE JEWELRY AND ANY VALUABLES AT HOME  *WEAR LOOSE COMFORTABLE CLOTHING (PREFERABLY A BUTTON UP SHIRT)        Thank you,  [facility]  [place], LPN [facility]  Pre-Admit

## 2024-03-14 ENCOUNTER — HOSPITAL ENCOUNTER (OUTPATIENT)
Facility: HOSPITAL | Age: 54
Discharge: HOME | End: 2024-03-14
Attending: EMERGENCY MEDICINE | Admitting: EMERGENCY MEDICINE
Payer: COMMERCIAL

## 2024-03-14 VITALS
WEIGHT: 200 LBS | HEART RATE: 63 BPM | RESPIRATION RATE: 18 BRPM | TEMPERATURE: 97 F | OXYGEN SATURATION: 96 % | DIASTOLIC BLOOD PRESSURE: 85 MMHG | SYSTOLIC BLOOD PRESSURE: 142 MMHG | BODY MASS INDEX: 29.62 KG/M2 | HEIGHT: 69 IN

## 2024-03-14 DIAGNOSIS — G89.29 CHRONIC PAIN: ICD-10-CM

## 2024-03-14 PROCEDURE — 64483 NJX AA&/STRD TFRM EPI L/S 1: CPT | Mod: 50 | Performed by: EMERGENCY MEDICINE

## 2024-03-14 PROCEDURE — 63600175 PHARM REV CODE 636 W HCPCS: Performed by: EMERGENCY MEDICINE

## 2024-03-14 PROCEDURE — 25500020 PHARM REV CODE 255: Performed by: EMERGENCY MEDICINE

## 2024-03-14 PROCEDURE — 64483 NJX AA&/STRD TFRM EPI L/S 1: CPT | Mod: 50,,, | Performed by: EMERGENCY MEDICINE

## 2024-03-14 PROCEDURE — 25000003 PHARM REV CODE 250: Performed by: EMERGENCY MEDICINE

## 2024-03-14 RX ORDER — DEXAMETHASONE SODIUM PHOSPHATE 10 MG/ML
INJECTION INTRAMUSCULAR; INTRAVENOUS
Status: DISCONTINUED | OUTPATIENT
Start: 2024-03-14 | End: 2024-03-14 | Stop reason: HOSPADM

## 2024-03-14 RX ORDER — MIDAZOLAM HYDROCHLORIDE 2 MG/2ML
INJECTION, SOLUTION INTRAMUSCULAR; INTRAVENOUS
Status: DISCONTINUED | OUTPATIENT
Start: 2024-03-14 | End: 2024-03-14 | Stop reason: HOSPADM

## 2024-03-14 RX ORDER — LIDOCAINE HYDROCHLORIDE 10 MG/ML
INJECTION, SOLUTION EPIDURAL; INFILTRATION; INTRACAUDAL; PERINEURAL
Status: DISCONTINUED | OUTPATIENT
Start: 2024-03-14 | End: 2024-03-14 | Stop reason: HOSPADM

## 2024-03-14 RX ORDER — FENTANYL CITRATE 50 UG/ML
INJECTION, SOLUTION INTRAMUSCULAR; INTRAVENOUS
Status: DISCONTINUED | OUTPATIENT
Start: 2024-03-14 | End: 2024-03-14 | Stop reason: HOSPADM

## 2024-03-14 RX ORDER — LIDOCAINE HYDROCHLORIDE 20 MG/ML
INJECTION, SOLUTION EPIDURAL; INFILTRATION; INTRACAUDAL; PERINEURAL
Status: DISCONTINUED | OUTPATIENT
Start: 2024-03-14 | End: 2024-03-14 | Stop reason: HOSPADM

## 2024-03-14 NOTE — DISCHARGE INSTRUCTIONS
[facility] [place]  Dr. Karson Longoria  Messaging service # [phone]    POST-PROCEDURE INSTRUCTIONS:    Today you had an injection that included a steroid medications.  The steroid may or may not have been mixed with a local anesthetic when it was injected.   If the injection was in the neck, you may feel some pressure, numbness, or slight weakness in the arm after the procedure for a short period of time (this is a normal response), if this persists for longer than 1 day please contact our office or go to the emergency room.  If the injection was in the low back, you may feel some pressure, numbness, or slight weakness in the leg after the procedure for a short period of time (this is a normal response), if this persists for longer than 1 day please contact our office or go to the emergency room.  You may get side effects from the steroid.  This is not uncommon.  Symptoms include: elevated blood sugar, elevated blood pressure, headache, flushing, nausea, insomnia.  These symptoms are transient and will resolve within 1-3 days.  If symptoms last longer than this please contact our office or head to the emergency room.  Steroid medications can take anywhere from 3-14 days to take effect (rarely longer).  You may notice that your pain worsens for a short period of time after the injection, this would not be unusual due to the pressure and trauma from the needle.    If you do not have a follow up appointment scheduled, please contact my office (or the office of the physician who referred you for the procedure) to get a post-procedure follow up scheduled 2-4 weeks after the procedure.  This can be done as a virtual visit if that is more convenient for you.      What you need to do:    Keep a record of your response to the injection you had today.    How much relief did you get?   When did the relief start and how long did it last?  Were you able to decrease the use of any of your pain  medications?  Were you able to increase your level of activity?  How long did the relief last?    What to watch out for:    If you experience any of the following symptoms after your procedure, please notify the messaging service immediately (see above for contact information):   fever (increased oral temperature)   bleeding or swelling at the injection site,    drainage, rash or redness at the injection site    possible signs of infection    increased pain at the injection site   worsening of your usual pain   severe headache   new or worsening numbness    new arm and/or leg weakness, or    changes in bowel and/or bladder function: urinating or defecating on yourself and not knowing that you did it.    PLEASE FOLLOW ALL INSTRUCTIONS CAREFULLY     Do not engage in strenuous activity (e.g., lifting or pushing heavy objects or repeated bending) for 24 hours.     Do not take a bath, swim or use Jacuzzi for 24 hours after procedure. (A shower is fine).   Remove any Band-Aids when you get home.    Use cold/ice, as needed for comfort.  We recommend the use of cold therapy alternating on for 20 minutes, off for 20 minutes.    Do not apply direct heat (heating pad or heat packs) to the injection site for 24 hours.     Resume your usual medications, unless instructed otherwise by your Pain Physician.     If you are on warfarin (Coumadin) or other blood thinner, resume this medication as instructed by your prescribing Physician.    IF AT ANY POINT YOU ARE VERY CONCERNED ABOUT YOUR SYMPTOMS, PLEASE GO TO THE EMERGENCY ROOM.    If you develop worsening pain, weakness, numbness, lose bowel or bladder control (i.e., having an accident where you did not even know you had to go to the bathroom and suddenly noticed you soiled yourself), saddle anesthesia (a loss of sensation restricted to the area of the buttocks, anus and between the legs -- i.e., those parts of your body that would touch a saddle if you were sitting on one) you  need to go immediately to the emergency department for evaluation and treatment.    ----------------------------------------------------------------------------------------------------------------------------------------------------------------  If you received Sedation please read the following instructions:  POST SEDATION INSTRUCTIONS    Today you received intravenous medication (also known as sedation) that was used to help you relax and/or decrease discomfort during your procedure. This medication will be acting in your body for the next 24 hours, so you might feel a little tired or sleepy. This feeling will slowly wear off.   Common side effects associated with these medications include: drowsiness, dizziness, sleepiness, confusion, feeling excited, difficulty remembering things, lack of steadiness with walking or balance, loss of fine muscle control, slowed reflexes, difficulty focusing, and blurred vision.  Some over-the-counter and prescription medications (e.g., muscle relaxants, opioids, mood-altering medications, sedatives/hypnotics, antihistamines) can interact with the intravenous medication you received and cause an increased risk of the side effects listed above in addition to other potentially life threatening side effects. Use extreme caution if you are taking such medications, and consult with your Pain Physician or prescribing physician if you have any questions.  For the next 12-24 hours:    DO NOT--Drive a car, operate machinery or power tools   DO NOT--Drink any alcoholic beverages (not even beer), they may dangerously increase the risk of side effects.    DO NOT--Make any important legal or business decisions or sign important documents.  We advise you to have someone to assist you at home. Move slowly and carefully. Do not make sudden changes in position. Be aware of dizziness or light-headedness and move accordingly.   If you seek medical treatment within 24 hours, let the nurse or doctor  caring for you know that you have received the above medications. If you have any questions or concerns related to your sedation or treatment today please contact us.

## 2024-03-14 NOTE — DISCHARGE SUMMARY
Discharge Note  Short Stay      SUMMARY     Admit Date: 3/14/2024    Attending Physician: Karson Longoria      Discharge Physician: Karson Longoria      Discharge Date: 3/14/2024 12:02 PM    Procedure(s) (LRB):  B/L L4-5 TFESI (Bilateral)    Final Diagnosis: Lumbar radiculopathy [M54.16]    Disposition: Home or self care    Patient Instructions:   Current Discharge Medication List        CONTINUE these medications which have NOT CHANGED    Details   amitriptyline (ELAVIL) 25 MG tablet Take 1 tablet (25 mg total) by mouth nightly as needed for Insomnia.  Qty: 30 tablet, Refills: 3    Associated Diagnoses: Chronic daily headache      gabapentin (NEURONTIN) 300 MG capsule Take 1 capsule (300 mg total) by mouth every evening for 7 days, THEN 1 capsule (300 mg total) 2 (two) times daily for 7 days, THEN 1 capsule (300 mg total) 3 (three) times daily for 16 days.  Qty: 69 capsule, Refills: 0    Associated Diagnoses: Cervical radiculopathy; Cervical post-laminectomy syndrome; Lumbar radiculopathy      ibuprofen (ADVIL,MOTRIN) 200 MG tablet Take 200 mg by mouth every 6 (six) hours as needed for Pain.      rosuvastatin (CRESTOR) 20 MG tablet TAKE 1 TABLET BY MOUTH ONCE  DAILY  Qty: 90 tablet, Refills: 0    Associated Diagnoses: Hyperlipidemia, unspecified hyperlipidemia type      famotidine (PEPCID) 20 MG tablet Take 1 tablet (20 mg total) by mouth 2 (two) times daily.  Qty: 180 tablet, Refills: 3    Associated Diagnoses: Gastroesophageal reflux disease, unspecified whether esophagitis present                 Discharge Diagnosis: Lumbar radiculopathy [M54.16]  Condition on Discharge: Stable with no complications to procedure   Diet on Discharge: Same as before.  Activity: as per instruction sheet.  Discharge to: Home with a responsible adult.  Follow up: 2-4 weeks       Please call my office or pager at [phone] if experienced any weakness or loss of sensation, fever > 101.5, pain uncontrolled with oral medications,  persistent nausea/vomiting/or diarrhea, redness or drainage from the incisions, or any other worrisome concerns. If physician on call was not reached or could not communicate with our office for any reason please go to the nearest emergency department

## 2024-03-14 NOTE — OP NOTE
Lumbar Transforaminal Epidural Steroid Injection under Fluoroscopic Guidance    The procedure, risks, benefits, and options were discussed with the patient. There are no contraindications to the procedure. The patent expressed understanding and agreed to the procedure. Informed written consent was obtained prior to the start of the procedure and can be found in the patient's chart.    PATIENT NAME: Heraclio Lam   MRN: [id number]     DATE OF PROCEDURE: 03/14/2024    PROCEDURE:  Bilateral  L4/5 Lumbar Transforaminal Epidural Steroid Injection under Fluoroscopic Guidance    PRE-OP DIAGNOSIS: Lumbar radiculopathy [M54.16] Lumbar radiculopathy [M54.16]    POST-OP DIAGNOSIS: Same    PHYSICIAN: Karson Longoria MD    ASSISTANTS: WILIAN     MEDICATIONS INJECTED: Preservative-free Decadron 10mg with 5cc of Lidocaine 1% MPF     LOCAL ANESTHETIC INJECTED: Xylocaine 2%     SEDATION: Versed 1mg and Fentanyl 50mcg                                                                                                                                                                                     Conscious sedation ordered by M.D. Patient re-evaluation prior to administration of conscious sedation. No changes noted in patient's status from initial evaluation. The patient's vital signs were monitored by RN and patient remained hemodynamically stable throughout the procedure.    Event Time In   Sedation Start 1150       ESTIMATED BLOOD LOSS: None    COMPLICATIONS: None    TECHNIQUE: Time-out was performed to identify the patient and procedure to be performed. With the patient laying in a prone position, the surgical area was prepped and draped in the usual sterile fashion using ChloraPrep and a fenestrated drape.The levels were determined under fluoroscopy guidance. Skin anesthesia was achieved by injecting Lidocaine 2% over the injection sites. The transforaminal spaces were then approached with a 25 gauge, 3.5 inch spinal quinke needle  that was introduced under fluoroscopic guidance in the AP and Lateral views. Once the needle tip was in the area of the transforaminal space, and there was no blood, CSF or paraesthesias, contrast dye Omnipaque (300mg/mL) was injected to confirm placement and there was no vascular runoff. Fluoroscopic imaging in the AP and lateral views revealed a clear outline of the spinal nerve with proximal spread of agent through the neural foramen into the epidural space. 3 mL of the medication mixture listed above was injected slowly at each site. Displacement of the radio opaque contrast after injection of the medication confirmed that the medication went into the area of the transforaminal spaces. The needles were removed and bleeding was nil. A sterile dressing was applied. No specimens collected. The patient tolerated the procedure well.     The patient was monitored after the procedure in the recovery area. They were given post-procedure and discharge instructions to follow at home. The patient was discharged in a stable condition.        Karson Longoria MD

## 2024-03-14 NOTE — PLAN OF CARE
Heraclio Ugo Lam has met all discharge criteria from Phase II. Vital Signs are stable, ambulating  without difficulty. Discharge instructions given, patient verbalized understanding. Discharged from facility via wheelchair in stable condition.

## 2024-03-20 ENCOUNTER — HOSPITAL ENCOUNTER (OUTPATIENT)
Dept: RADIOLOGY | Facility: HOSPITAL | Age: 54
Discharge: HOME | End: 2024-03-20
Attending: ORTHOPAEDIC SURGERY
Payer: COMMERCIAL

## 2024-03-20 ENCOUNTER — CLINICAL SUPPORT (OUTPATIENT)
Dept: REHABILITATION | Facility: HOSPITAL | Age: 54
End: 2024-03-20
Attending: EMERGENCY MEDICINE
Payer: COMMERCIAL

## 2024-03-20 DIAGNOSIS — M48.02 CERVICAL STENOSIS OF SPINAL CANAL: ICD-10-CM

## 2024-03-20 DIAGNOSIS — R29.898 DECREASED ROM OF NECK: Primary | ICD-10-CM

## 2024-03-20 DIAGNOSIS — M48.061 SPINAL STENOSIS OF LUMBAR REGION, UNSPECIFIED WHETHER NEUROGENIC CLAUDICATION PRESENT: ICD-10-CM

## 2024-03-20 DIAGNOSIS — M54.12 CERVICAL RADICULOPATHY: ICD-10-CM

## 2024-03-20 PROCEDURE — 72050 X-RAY EXAM NECK SPINE 4/5VWS: CPT | Mod: TC

## 2024-03-20 PROCEDURE — 97110 THERAPEUTIC EXERCISES: CPT

## 2024-03-20 PROCEDURE — 72125 CT NECK SPINE W/O DYE: CPT | Mod: 26,,, | Performed by: RADIOLOGY

## 2024-03-20 PROCEDURE — 97161 PT EVAL LOW COMPLEX 20 MIN: CPT

## 2024-03-20 PROCEDURE — 72050 X-RAY EXAM NECK SPINE 4/5VWS: CPT | Mod: 26,,, | Performed by: RADIOLOGY

## 2024-03-20 PROCEDURE — 72125 CT NECK SPINE W/O DYE: CPT | Mod: TC

## 2024-03-21 ENCOUNTER — OFFICE VISIT (OUTPATIENT)
Dept: ORTHOPEDICS | Facility: CLINIC | Age: 54
End: 2024-03-21
Payer: COMMERCIAL

## 2024-03-21 VITALS — HEIGHT: 69 IN | BODY MASS INDEX: 29.71 KG/M2 | WEIGHT: 200.63 LBS

## 2024-03-21 DIAGNOSIS — M51.36 DDD (DEGENERATIVE DISC DISEASE), LUMBAR: ICD-10-CM

## 2024-03-21 DIAGNOSIS — M47.812 CERVICAL SPONDYLOSIS: ICD-10-CM

## 2024-03-21 DIAGNOSIS — G99.2 STENOSIS OF CERVICAL SPINE WITH MYELOPATHY: Primary | ICD-10-CM

## 2024-03-21 DIAGNOSIS — M54.12 CERVICAL RADICULOPATHY: ICD-10-CM

## 2024-03-21 DIAGNOSIS — M48.02 STENOSIS OF CERVICAL SPINE WITH MYELOPATHY: Primary | ICD-10-CM

## 2024-03-21 DIAGNOSIS — M50.30 DDD (DEGENERATIVE DISC DISEASE), CERVICAL: ICD-10-CM

## 2024-03-21 DIAGNOSIS — M47.816 LUMBAR SPONDYLOSIS: ICD-10-CM

## 2024-03-21 DIAGNOSIS — M48.062 LUMBAR STENOSIS WITH NEUROGENIC CLAUDICATION: ICD-10-CM

## 2024-03-21 PROCEDURE — 99214 OFFICE O/P EST MOD 30 MIN: CPT | Mod: S$GLB,,, | Performed by: ORTHOPAEDIC SURGERY

## 2024-03-21 PROCEDURE — 3008F BODY MASS INDEX DOCD: CPT | Mod: CPTII,S$GLB,, | Performed by: ORTHOPAEDIC SURGERY

## 2024-03-21 PROCEDURE — 99999 PR PBB SHADOW E&M-EST. PATIENT-LVL IV: CPT | Mod: PBBFAC,,, | Performed by: ORTHOPAEDIC SURGERY

## 2024-03-21 PROCEDURE — 1159F MED LIST DOCD IN RCRD: CPT | Mod: CPTII,S$GLB,, | Performed by: ORTHOPAEDIC SURGERY

## 2024-03-21 PROCEDURE — 1160F RVW MEDS BY RX/DR IN RCRD: CPT | Mod: CPTII,S$GLB,, | Performed by: ORTHOPAEDIC SURGERY

## 2024-03-21 NOTE — PROGRESS NOTES
DATE: 3/21/2024  PATIENT: Heraclio Lam    Attending Physician: Cosme Chaidez M.D.    CHIEF COMPLAINT: neck and b/l shoulder pain    HISTORY:  Heraclio Lam is a 53 y.o. male w/ a hx of shunt for syrinx in 1999 and C6-7 ACDF in 2006 presents for initial evaluation of neck and b/l shoulder pain (Neck - 6, Shoulder - 6). The pain has been present for years. The patient describes the pain as dull and it radiates to both shoulders. The pain is worse with activity and improved by rest. There is BUE associated numbness and tingling. There is BUE subjective weakness. Prior treatments have included meds (ibuprofen and gabapentin), PT, and ESIs, but no recent surgery.     He also complained of years history of LBP that radiates to BLE. There is associated BLE n/t and weakness. He cannot walk far due to pain; he gets relief by leaning forward on a shopping cart.    The Patient endorses myelopathic symptoms such as handwriting changes or difficulty with buttons/coins/keys. He has trouble with walking/balance. Denies perineal paresthesias, bowel/bladder dysfunction.    The patient does not smoke, have DM or endorse IVDU. The patient is not on any blood thinners and does not take chronic narcotics. He is a retired .    PAST MEDICAL/SURGICAL HISTORY:  Past Medical History:   Diagnosis Date    Arthritis      Past Surgical History:   Procedure Laterality Date    COLONOSCOPY N/A 11/29/2022    Procedure: COLONOSCOPY;  Surgeon: Cody Valle MD;  Location: [facility] ENDO [address]);  Service: Endoscopy;  Laterality: N/A;  instructions via portal and mail -   11/22 pre call left message -     CSF SHUNT      EPIDURAL STEROID INJECTION N/A 12/4/2018    Procedure: Injection, Steroid, Epidural CERVICAL T1-2 INTERLAMINAR BATSHEVA;  Surgeon: Elba Juarez MD;  Location: [facility] PAIN T;  Service: Pain Management;  Laterality: N/A;    EPIDURAL STEROID INJECTION N/A 1/8/2019    Procedure: Injection, Steroid,  Epidural CERVICAL T1-2 IL BATSHEVA;  Surgeon: Elba Juarez MD;  Location: [facility] PAIN MGT;  Service: Pain Management;  Laterality: N/A;    EPIDURAL STEROID INJECTION N/A 8/20/2020    Procedure: INJECTION, STEROID, EPIDURAL T1-2 IL BATSHEVA;  Surgeon: Elba Juarez MD;  Location: [facility] PAIN MGT;  Service: Pain Management;  Laterality: N/A;  T1-2 IL BATSHEVA   consent needed    EPIDURAL STEROID INJECTION INTO LUMBAR SPINE Bilateral 3/14/2024    Procedure: B/L L4-5 TFESI;  Surgeon: Karson Longoria MD;  Location: [facility] PAIN MANAGEMENT;  Service: Pain Management;  Laterality: Bilateral;  20 mins    JOINT REPLACEMENT  2006    disc Sx , cervical    SPINE SURGERY         Current Medications:   Current Outpatient Medications:     ibuprofen (ADVIL,MOTRIN) 200 MG tablet, Take 200 mg by mouth every 6 (six) hours as needed for Pain., Disp: , Rfl:     rosuvastatin (CRESTOR) 20 MG tablet, TAKE 1 TABLET BY MOUTH ONCE  DAILY, Disp: 90 tablet, Rfl: 0    amitriptyline (ELAVIL) 25 MG tablet, Take 1 tablet (25 mg total) by mouth nightly as needed for Insomnia., Disp: 30 tablet, Rfl: 3    famotidine (PEPCID) 20 MG tablet, Take 1 tablet (20 mg total) by mouth 2 (two) times daily., Disp: 180 tablet, Rfl: 3    gabapentin (NEURONTIN) 300 MG capsule, Take 1 capsule (300 mg total) by mouth every evening for 7 days, THEN 1 capsule (300 mg total) 2 (two) times daily for 7 days, THEN 1 capsule (300 mg total) 3 (three) times daily for 16 days., Disp: 69 capsule, Rfl: 0    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   Tobacco Use    Smoking status: Never    Smokeless tobacco: Never   Substance and Sexual Activity    Alcohol use: Not Currently    Drug use: No     Social Determinants of Health     Financial Resource Strain: Low Risk  (3/3/2024)    Overall Financial Resource Strain (CARDIA)     Difficulty of Paying Living Expenses: Not hard at all   Food  Insecurity: No Food Insecurity (3/3/2024)    Hunger Vital Sign     Worried About Running Out of Food in the Last Year: Never true     Ran Out of Food in the Last Year: Never true   Transportation Needs: No Transportation Needs (3/3/2024)    PRAPARE - Transportation     Lack of Transportation (Medical): No     Lack of Transportation (Non-Medical): No   Physical Activity: Insufficiently Active (3/3/2024)    Exercise Vital Sign     Days of Exercise per Week: 7 days     Minutes of Exercise per Session: 20 min   Stress: Stress Concern Present (3/3/2024)    [employer] - Occupational Stress Questionnaire     Feeling of Stress : Very much   Social Connections: Unknown (3/3/2024)    Social Connection and Isolation Panel [NHANES]     Frequency of Communication with Friends and Family: More than three times a week     Frequency of Social Gatherings with Friends and Family: More than three times a week     Active Member of Clubs or Organizations: No     Attends Club or Organization Meetings: Never     Marital Status: Never    Housing Stability: Low Risk  (3/3/2024)    Housing Stability Vital Sign     Unable to Pay for Housing in the Last Year: No     Number of Places Lived in the Last Year: 1     Unstable Housing in the Last Year: No       REVIEW OF SYSTEMS:  Constitution: Negative. Negative for chills, fever and night sweats.   Cardiovascular: Negative for chest pain and syncope.   Respiratory: Negative for cough and shortness of breath.   Gastrointestinal: See HPI. Negative for nausea/vomiting. Negative for abdominal pain.  Genitourinary: See HPI. Negative for discoloration or dysuria.  Skin: Negative for dry skin, itching and rash.   Hematologic/Lymphatic: negative for bleeding/clotting disorders.   Musculoskeletal: Negative for falls and muscle weakness.   Neurological: See HPI. no history of seizures. no history of cranial surgery or shunts.  Endocrine: Negative for polydipsia,  "polyphagia and polyuria.   Allergic/Immunologic: Negative for hives and persistent infections.  Psychiatric/Behavioral: Negative for depression and insomnia.         EXAM:  Ht 5' 9" (1.753 m)   Wt 91 kg (200 lb 9.6 oz)   BMI 29.62 kg/m²     General: The patient is a 53 y.o. male in no apparent distress, the patient is orientatied to person, place and time.  Psych: Normal mood and affect  HEENT: Vision grossly intact, hearing intact to the spoken word.  Lungs: Respirations unlabored.  Gait: Normal station and gait, no difficulty with toe or heel walk.   Skin: Cervical skin negative for rashes, lesions, hairy patches. Anterior and posterior cervical scars.  Range of motion: Cervical range of motion is acceptable. There is posterior cervical and lower lumbar tenderness to palpation.  Spinal Balance: Global saggital and coronal spinal balance acceptable, no significant for scoliosis and kyphosis.  Musculoskeletal: No pain with the range of motion of the bilateral shoulders and elbows. Normal bulk and contour of the bilateral hands.  Vascular: Bilateral hands warm and well perfused, Radial pulses 2+ bilaterally.  Neurological: Normal strength and tone in all major motor groups in the bilateral upper and lower extremities. Normal sensation to light touch in the C5-T1 and L2-S1 dermatomes bilaterally.  Deep tendon reflexes symmetric 2+ in the bilateral upper and lower extremities.  Negative Inverted Radial Reflex and Lopez's bilaterally. Negative Babinski bilaterally.     IMAGING:   Today I independently reviewed the following images and my interpretations are as follows:    AP, Lat and Flex/Ex  upright C-spine films demonstrate C6-7 ACDF. Spondylosis and DDD.    MRI cervical showed syrinx from C5-T1. C3-4 mod-severe central and b/l foraminal stenosis.    MRI thoracic showed T8-9 mod central stenosis.    MRI lumbar showed mod-severe L3-5 central stenosis and L4-5 b/l foraminal stenosis.    CT cervical showed " spondylosis. C6-7 laminectomy.     Body mass index is 29.62 kg/m².  Hemoglobin A1C   Date Value Ref Range Status   03/06/2023 5.1 4.0 - 5.6 % Final     Comment:     ADA Screening Guidelines:  5.7-6.4%  Consistent with prediabetes  >or=6.5%  Consistent with diabetes    High levels of fetal hemoglobin interfere with the HbA1C  assay. Heterozygous hemoglobin variants (HbS, HgC, etc)do  not significantly interfere with this assay.   However, presence of multiple variants may affect accuracy.     10/05/2022 5.3 4.0 - 5.6 % Final     Comment:     ADA Screening Guidelines:  5.7-6.4%  Consistent with prediabetes  >or=6.5%  Consistent with diabetes    High levels of fetal hemoglobin interfere with the HbA1C  assay. Heterozygous hemoglobin variants (HbS, HgC, etc)do  not significantly interfere with this assay.   However, presence of multiple variants may affect accuracy.     06/22/2021 5.2 4.0 - 5.6 % Final     Comment:     ADA Screening Guidelines:  5.7-6.4%  Consistent with prediabetes  >or=6.5%  Consistent with diabetes    High levels of fetal hemoglobin interfere with the HbA1C  assay. Heterozygous hemoglobin variants (HbS, HgC, etc)do  not significantly interfere with this assay.   However, presence of multiple variants may affect accuracy.         ASSESSMENT/PLAN:  Stenosis of cervical spine with myelopathy  -     Case Request Operating Room: DISCECTOMY, SPINE, CERVICAL, ANTERIOR APPROACH, WITH FUSION  C3-4  SPINEWAVE  SNS: VOCAL CORDS/MOTORS/SSEP    Cervical radiculopathy    DDD (degenerative disc disease), cervical    Cervical spondylosis    Lumbar spondylosis    DDD (degenerative disc disease), lumbar    Lumbar stenosis with neurogenic claudication      No follow-ups on file.    Patient has cervical spondylosis and stenosis with myeloradiculopathy. I discussed the natural history of their diagnoses as well as surgical and nonsurgical treatment options. I educated the patient on the importance of core/back  strengthening, correct posture, bending/lifting ergonomics, and low-impact aerobic exercises (walking, elliptical, and aquatherapy). Patient has failed conservative management and is a candidate for C3-4 ACDF. He will need preop clearance.    I had a sit down discussion with the patient and mother regarding cervical myelopathy. I discussed the known stepwise degeneration of cervical myelopathy. I discussed the risks and benefits of decompressive surgery, including the concept that surgery would be done to prevent further neurological injury/degeneration rather than to ameliorate any existing deficits.     I had a sit down discussion with the patient and mother regarding the above surgery. We specifically discussed the risks, benefits, and alternatives to surgery. We discussed the surgical procedure including the skin incision, nerve decompression, bone fusion, allograft and/or iliac crest bone graft, and surgical implants including plates and interbody spacers as indicated: they understand the risks include but are not limited to death, paralysis, blindness, bleeding, infection, damage to arteries, veins and nerves, tracheal injury, esophageal injury, vertebral artery injury, dysphagia, spinal fluid leak, continued or worsening pain, no improvement in symptoms, non-union, and the possible need for more surgery in the future, as well as the possibility other unforseen and unknown complications. We talked about expected hospital stay and recovery period. All questions were answered; they understand and wish to proceed.     Regarding his thoracic spine, he has thoracic spondylosis and stenosis with myelopathy. He is a candidate for T8-9 decompression fusion.    Regarding his lumbar spine, he has lumbar spondylosis and stenosis with neurogenic claudication. He is a candidate for L3-5 PLDF/TLIF (L) if he fails conservative management.     Cosme Chaidez MD  Orthopaedic Spine Surgeon  Department of Orthopaedic  Surgery  [phone]

## 2024-03-22 ENCOUNTER — PATIENT MESSAGE (OUTPATIENT)
Dept: INTERNAL MEDICINE | Facility: CLINIC | Age: 54
End: 2024-03-22
Payer: COMMERCIAL

## 2024-03-22 ENCOUNTER — TELEPHONE (OUTPATIENT)
Dept: INTERNAL MEDICINE | Facility: CLINIC | Age: 54
End: 2024-03-22
Payer: COMMERCIAL

## 2024-03-22 NOTE — TELEPHONE ENCOUNTER
----- Message from Sadie Ahn sent at 3/22/2024  1:33 PM CDT -----  From:Jessy Lam (proxy for Heraclio Lam)       Sent:3/22/2024  1:08 PM CDT         To:Patient Appointment Schedule Request Message List    Subject:Appointment Request    May 18       ----- Message -----       From:Med Assistant Pavon       Sent:3/22/2024  7:24 AM CDT         To:Heraclio Lam    Subject:Appointment Request    Dear Heraclio Lam,  Good morning, we will need your procedure date to schedule the Pre-Op exam and it has to be done within 30 days of your procedure date per insurance. Once you get the procedure date and any clearance paperwork contact our office.    Thank you for using [facility].    Savanah MOSES, Medical Assistant  [facility], Louis NIX MD  [facility]

## 2024-03-25 NOTE — TELEPHONE ENCOUNTER
Scheduled on requested date/time with member of PCP care team Dr. Armstrong.   Skin normal color for race, warm, dry and intact. No evidence of rash.

## 2024-03-29 PROBLEM — R29.898 DECREASED ROM OF NECK: Status: ACTIVE | Noted: 2024-03-29

## 2024-03-29 NOTE — PLAN OF CARE
"[facility]   Physical Therapy Initial Evaluation      Name: Heraclio Lam  Clinic Number: [id number]    Therapy Diagnosis:   Encounter Diagnoses   Name Primary?    Cervical stenosis of spinal canal     Spinal stenosis of lumbar region, unspecified whether neurogenic claudication present     Cervical radiculopathy     Decreased ROM of neck Yes        Physician: Karson Longoria MD    Physician Orders: PT Eval and Treat   Medical Diagnosis from Referral: Cervical stenosis of spinal canal [M48.02], Spinal stenosis of lumbar region, unspecified whether neurogenic claudication present [M48.061], Cervical radiculopathy [M54.12]   Evaluation Date: 3/20/2024  Authorization Period Expiration: 12/31/2024  Plan of Care Expiration: 6/20/2024  Progress Note Due: 4/20/2024  Date of Surgery: N/A  Visit # / Visits authorized: 1/ 1   FOTO: 1/ 3    Precautions: Standard     Time In: 12:30 PM  Time Out: 1:30 PM  Total Billable Time: 60 minutes    Subjective     Date of onset: chronic condition    History of current condition - Heraclio is a right handed male presenting with c/o neck pain with intermittent bilaterally upper extremity radiculopathy. He states his neck pain is resulting from a work injury while picking up something heavy in 1999. He states he "retired" in September of 2019. He states he is no longer working. 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 He states he had a CSF shunt placement in 1999 and a cervical disc replacement in 2006. He reports radicular symptoms are worse in left upper extremity and with left neck rotation. He states he has symptoms in bilateral neck along with burning symptoms in his shoulders.    Patient reports history of back pain since 1999 as well that has improved following his recent epidural 3/14/2024. Reports occasional radicular symptoms down bilateral lower extremities, but unable to state distribution. Report symptoms in back are " "worse in the morning and improve following general movement "walking my dog". He reports his neck pain is his main complaint.    Per Orthopedics Note:  Heraclio Lam is a 53 y.o. male sp C6-7 ACDF here for initial evaluation of neck and bilateral arm pain (Neck - 10, Arm - 10). The pain has been present for years, worsening over time. The patient describes the pain as sharp and shooting from his neck to his shoulders. The pain is worse with turning left and improved by nothing. There is positive associated numbness and tingling. There is positive subjective weakness. Pt says in 1999 he had a "shunt put into his spinal cord to drain fluid".  He also had a C6-7 ACDF in 2006. Since then he has had continued pain. He has tried medication, PT and multiple ESIs and is miserable.     Pt also has chronic low back and bilateral leg pain with burning, numbness, and tingling. He has tried medication and PT for 8 weeks in the last 6 months, but no ESIs.    Falls: none    Imaging:   EXAMINATION:  MRI SPINE CERVICAL-THORACIC-LUMBAR WITHOUT CONTRAST (XPD)     CLINICAL HISTORY:  Myelopathy, chronic;  Radiculopathy, cervical region     TECHNIQUE:  Routine MRI evaluation of the cervical, thoracic and lumbar spine performed without contrast.     COMPARISON:  MRI 08/06/2020.     FINDINGS:  Cervical spine:     Remote postoperative change of C6-C7 ACDF and decompressive laminectomies.  Cervical spine alignment demonstrates 3 mm of retrolisthesis of C3 on C4.  Occipital condyles, C1 lateral masses and odontoid process are intact.  Vertebral body heights are well maintained without evidence for fracture.  No marrow signal abnormality to suggest an infiltrative process.     Degenerative disc desiccation throughout the visualized cervical spine with moderate height loss at C3-C4, similar when compared to the previous MRI.  Chronic degenerative endplate changes at C3-C4, slightly progressed when compared to the previous MRI.  No " endplate edema.     Persistent syrinx extending from C5 through C7-T1.  No associated cord edema or expansion.  Cerebellar tonsils are in their expected location.  Visualized brainstem is normal.     Vertebral artery flow voids are present.  Multiple thyroid nodules measuring up to 1.2 cm increased in size and conspicuity when compared to the previous MRI.  Visualized parotid and submandibular glands appear within normal limits.  No discrete pharyngeal or laryngeal masses.  No cervical lymphadenopathy.  Paraspinal musculature demonstrates normal bulk and signal intensity.     C2-C3: No spinal canal stenosis.  No neural foraminal narrowing.     C3-C4: 3 mm of retrolisthesis.  Broad-based posterior disc osteophyte complex effaces the ventral thecal sac and compresses the ventral spinal cord.  Bilateral uncovertebral joint spurring and bilateral ligamentum flavum hypertrophy.  Moderate spinal canal and lateral recess stenosis.  Moderate to severe bilateral neural foraminal narrowing.     C4-C5: Broad-based posterior disc osteophyte complex partially effaces the ventral thecal sac.  Bilateral uncovertebral joint spurring and bilateral ligamentum flavum hypertrophy.  Mild spinal canal stenosis.  Mild right and mild-to-moderate left neural foraminal narrowing.     C5-C6: Posterior disc osteophyte complex partially effaces the ventral thecal sac.  Bilateral facet arthropathy.  Mild spinal canal stenosis.  Mild left neural foraminal narrowing.     C6-C7: Posterior disc osteophyte complex partially effaces the ventral thecal sac.  Left uncovertebral joint spurring.  No spinal canal stenosis.  Mild left neural foraminal narrowing.     C7-T1: Bilateral facet arthropathy.  No spinal canal stenosis.  Mild-to-moderate left neural foraminal narrowing.     Thoracic spine:     Thoracic spine alignment appears within normal limits.  No spondylolisthesis.  Vertebral body heights are well maintained without evidence for fracture.  No  marrow signal abnormality to suggest an infiltrative process.     Multilevel degenerative disc desiccation and mild height loss.  No endplate edema.     Thoracic spinal cord demonstrates normal contour and signal intensity.     Bilateral cortical renal cysts.  Paraspinal musculature demonstrates normal bulk and signal intensity.     T2-T3: Broad-based posterior disc osteophyte complex encroaches into the bilateral foraminal zones.  Bilateral ligamentum flavum hypertrophy.  Mild spinal stenosis.  No neural foraminal narrowing.     T3-T4: Circumferential disc osteophyte complex with a superimposed left subarticular/foraminal disc osteophyte complex.  Moderate left lateral recess stenosis.  Mild left neural foraminal narrowing.     T4-T5: Left subarticular/foraminal disc osteophyte complex.  Moderate left lateral recess stenosis.  Mild left neural foraminal narrowing.     T8-T9: Central/right subarticular disc osteophyte protrusion effaces the ventral thecal sac and causes mass effect on the ventral spinal cord and right lateral recess.  Bilateral ligamentum flavum hypertrophy.  Moderate spinal right lateral recess stenosis.  No neural foraminal narrowing.     T10-T11: Circumferential disc osteophyte complex causes mass effect on the ventral thecal sac and left lateral recess.  Bilateral facet arthropathy and bilateral ligamentum flavum hypertrophy.  Mild-to-moderate spinal canal and left lateral recess stenosis.  Mild left neural foraminal narrowing.     T11-T12: Left foraminal/extraforaminal disc osteophyte complex.  No spinal canal stenosis.  Mild left neural foraminal narrowing.     Lumbar spine:     Lumbar spine alignment appears within normal limits.  No spondylolisthesis.  No spondylolysis.  Vertebral body heights are well maintained without evidence for fracture.  No marrow signal abnormality to suggest an infiltrative process.     Degenerative disc desiccation and mild-to-moderate height loss from L3-L4  through L5-S1.  Chronic degenerative endplate changes at L3-L4 with minimal superimposed edema.  Posterior annular fissures from L3-L4 through L5-S1.     Distal spinal cord demonstrates normal contour and signal intensity.  Conus medullaris terminates at T12-L1.  Cauda equina appears normal without findings to suggest arachnoiditis.     Right renal cortical cyst.  Remaining visualized intra-abdominal structures demonstrate no significant abnormalities.  SI joints are symmetric.  Paraspinal musculature demonstrates normal bulk and signal intensity.     T12-L1: No spinal canal stenosis.  No neural foraminal narrowing.     L1-L2: No spinal canal stenosis.  No neural foraminal narrowing.     L2-L3: No spinal canal stenosis.  No neural foraminal narrowing.     L3-L4: Circumferential disc bulge causes mass effect on the ventral thecal sac and lateral recesses and encroaches into the bilateral foraminal zones.  Bilateral facet arthropathy and bilateral ligamentum flavum hypertrophy.  Moderate to severe spinal canal and lateral recess stenosis.  Mild right and mild-to-moderate left neural foraminal narrowing.     L4-L5: Circumferential disc bulge encroaches into the bilateral foraminal zones and demonstrates a superimposed posterior central protrusion that causes mass effect on the ventral thecal sac and lateral recesses.  Bilateral facet arthropathy and bilateral ligamentum flavum hypertrophy.  Severe spinal canal and lateral recess stenosis.  Moderate to severe bilateral neural foraminal narrowing.     L5-S1: Posterior broad-based disc protrusion causes mass effect on the ventral thecal sac and lateral recesses.  No spinal canal stenosis.  No neural foraminal narrowing.     Impression:     1. Remote postoperative change of C6-C7 ACDF and decompressive laminectomies.  2. Cervical spondylosis as detailed above, similar when compared to MRI dated 08/06/2020.  Moderate spinal canal and lateral recess stenosis and moderate to  "severe bilateral neural foraminal narrowing at C3-C4.  3. Thoracolumbar spondylosis as detailed above.  Moderate spinal canal and right lateral recess stenosis at T8-T9 with associated mass effect on the spinal cord.  Moderate to severe spinal canal and lateral recess stenosis at L3-L4 and L4-L5.  Moderate to severe neural foraminal narrowing at L4-L5.    Prior Therapy: yes in 2020 for his neck with improvements folllowing  Social History:  lives with mom and dad  Occupation: retired  Prior Level of Function: indp  Current Level of Function: indp    Pain:  Current 5/10, worst 7/10, best 0/10   Location: bilateral neck    Description: Aching, Dull, Tingling, and Numb  Aggravating Factors: left neck rotation  Easing Factors: pain medication, rest, and gabapentin    Patients goals: "want to get more motion out of neck and reduce my pain"     Medical History:   Past Medical History:   Diagnosis Date    Arthritis        Surgical History:   Heraclio Lam  has a past surgical history that includes CSF shunt; Joint replacement (2006); Epidural steroid injection (N/A, 12/4/2018); Epidural steroid injection (N/A, 1/8/2019); Spine surgery; Epidural steroid injection (N/A, 8/20/2020); Colonoscopy (N/A, 11/29/2022); and Epidural steroid injection into lumbar spine (Bilateral, 3/14/2024).    Medications:   Heraclio has a current medication list which includes the following prescription(s): amitriptyline, famotidine, gabapentin, ibuprofen, and rosuvastatin.    Allergies:   Review of patient's allergies indicates:  No Known Allergies     Objective      Observation: Rounded shoulder, Head forward, Affected scapula depressed, and Increased kyphosis    Cervical ROM: (measured in degrees)    Degrees Quality   Flexion 46 "Stiff"   Right SB 20 "Stiff"   Left SB 20 "Stiff"   Right rotation 50 "Stiff"   Left rotation 53 "Stiff"     Shoulder Active/ Passive ROM: (measured in degrees)   Shoulder Right UE Left UE   Flexion  WFLs WFLs "   Abduction WFLs WFLs   ER WFLs WFLs   IR WFLs WFLs     Sensation: Dermatomes: Impaired: C6 and C3 of left    Reflexes: normal biceps and triceps    Strength: (measured in neutral spine, gradin-5 out of 5)   Cervical MMT   Flexion 5/5   Extension 5/5   Right Side Bend 5/5   Left Side Bend 5/5   Upper trap. 5/5     Upper Extremity Strength: (grading 1-5 out of 5)      Right UE Left UE   Shoulder Flexion: 5/5 4+/5   Shoulder Abduction: 5/5 5/5   Shoulder ER: 4+/5 4+/5   Shoulder IR: 5/5 5/5        Elbow Flexion: 5/5 5/5   Elbow Extension: 5/5 5/5        Wrist flexion: 5/5 5/5   Wrist extension: 5/5 5/5         #1 5/5 5/5     Cervical Spine Special Tests: ((+): positive; (-): negative)   Compression -   Spurling's A -   Spurling's B + left   Distraction -   Deep neck flexor test Fair 20 seconds     Upper Limb Neural Tension Testing:   Right  Left    (+ / -)  (+ / -)   Median Nerve -  -   Ulnar Nerve -  -   Radial Nerve -  -        Intake Outcome Measure for FOTO Neck Survey    Therapist reviewed FOTO scores for Heraclio Lam on 3/20/2024.   FOTO report - see Media section or FOTO account episode details.    Intake Score: 49%         Treatment     Total Treatment time (time-based codes) separate from Evaluation: 15 minutes     Heraclio received the treatments listed below:      therapeutic exercises to develop strength, endurance, ROM, flexibility, and posture for 15 minutes including:    Education and instruction of home exercise program    Patient Education and Home Exercises     Education provided:   - Role of Physical Therapy, plan of care, home exercise program, prognosis, physical therapy diagnosis  - neck anatomy with deficits present and contribution to symptoms     Written Home Exercises Provided: yes. Exercises were reviewed and Heraclio was able to demonstrate them prior to the end of the session.  Heraclio demonstrated good  understanding of the education provided. See EMR under Patient Instructions  for exercises provided during therapy sessions.    Assessment     Heraclio is a 53 y.o. male referred to outpatient Physical Therapy with a medical diagnosis of Cervical stenosis of spinal canal [M48.02], Spinal stenosis of lumbar region, unspecified whether neurogenic claudication present [M48.061], Cervical radiculopathy [M54.12]. Patient presents with deficits in neck range of motion and impaired sensation along dermatomes C6 and C3 of left side. Patient also with positive special testing for radicular symptoms. Patient deficits limiting daily activities and impairing quality of life.    Patient prognosis is Good.   Patient will benefit from skilled outpatient Physical Therapy to address the deficits stated above and in the chart below, provide patient /family education, and to maximize patientt's level of independence.     Plan of care discussed with patient: Yes  Patient's spiritual, cultural and educational needs considered and patient is agreeable to the plan of care and goals as stated below:     Anticipated Barriers for therapy: none    Medical Necessity is demonstrated by the following  History  Co-morbidities and personal factors that may impact the plan of care [x] LOW: no personal factors / co-morbidities  [] MODERATE: 1-2 personal factors / co-morbidities  [] HIGH: 3+ personal factors / co-morbidities    Moderate / High Support Documentation:   Co-morbidities affecting plan of care:     Personal Factors:   no deficits     Examination  Body Structures and Functions, activity limitations and participation restrictions that may impact the plan of care [x] LOW: addressing 1-2 elements  [] MODERATE: 3+ elements  [] HIGH: 4+ elements (please support below)    Moderate / High Support Documentation:      Clinical Presentation [x] LOW: stable  [] MODERATE: Evolving  [] HIGH: Unstable     Decision Making/ Complexity Score: low       Short Term Goals (4 Weeks):   1. Pt will be independent with HEP to supplement PT  in improving pain free cervical mobility  2. Pt will improve cervical AROM 10 deg in rotational planes to improve cervical mobility for driving  3. Pt will improve UE MMTs by 1/2 grade in all planes to improve strength for lifting and carrying tasks.  4. Pt will demonstrate improved sitting posture to decrease pain experienced in head and neck.  Long Term Goals (8 Weeks):   1. Pt will improve FOTO to </=42% limitation to improve perceived limitation with changing and maintaining mobility.  2. Pt will improve cervical AROM to WNL in all planes to improve cervical mobility for driving   3. Pt will improve UE MMTs 1 grade in all planes to improve strength for lifting and carrying tasks.  4. Pt will report no pain with lifting 20 lbs to promote physical activity.   5. Pt will report no pain with cervical AROM in all planes to promote QOL.     Plan     Plan of care Certification: 3/20/2024 to 6/20/2024.    Outpatient Physical Therapy 1-2 times weekly for 8-12 weeks to include the following interventions: Gait Training, Manual Therapy, Moist Heat/ Ice, Neuromuscular Re-ed, Patient Education, Self Care, Therapeutic Activities, and Therapeutic Exercise.     Isaias Pham, PT        Physician's Signature: _________________________________________ Date: ________________

## 2024-04-09 PROCEDURE — 93005 ELECTROCARDIOGRAM TRACING: CPT | Mod: S$GLB,,, | Performed by: INTERNAL MEDICINE

## 2024-04-09 PROCEDURE — 93010 ELECTROCARDIOGRAM REPORT: CPT | Mod: S$GLB,,, | Performed by: INTERNAL MEDICINE

## 2024-04-09 NOTE — PROGRESS NOTES
Chronic Pain-Established (Follow up visit)     Interval History 04/10/24: Patient is s/p L4/5 BL TFESI on 03/14/24 L4/5 and he reports 30% relief of pain in his back and lower extremities. Patient was evaluated by spine Surgery who will proceed with ACSF on 05/08/24.     Interval History 02/28/24:  Since their last visit the pain has been unchanged. Patient was started on gabapentin, Robaxin and Zanaflex during their last visit and they have not been taking it.  They were refer to physical therapy in are not participating in physical therapy as the patient states that he was not called.  No urinary or fecal incontinence. Patient reports saddle anesthesia that has been present since 1999.  Patient is still having cervical radiculopathy and bilateral lower lumbar radiculopathy.    Original HISTORY OF PRESENT ILLNESS 01/29/24: Heraclio Lam is a 53 y.o. year old male patient who has a past medical history of Arthritis. Patient reports that he had an injury since 1999.     Original Pain Description:  The pain is located in the neck and is radiating to the shoulders . The pain is described as stabbing and described as a burning sensation. Exacerbating factors: Sitting, Laying, Bending, and Walking. Mitigating factors medications. Symptoms interfere with daily activity and sleeping. The patient feels like symptoms have been worsening. Patient denies significant motor weakness. Patient reports that his last BATSHEVA was in 2020, but that his symptoms are starting to come back.      Patient reports that he is having lower back pain and radiates to his feet posteriorly, described as a needle like sensation. No weakness. No permament loss of sensation. Negative red flags.     Original PAIN SCORES:  Best: Pain is 9  Current: Pain is 10  Worst: Pain is 10    Interval History 09/03/2020:   HPI: The patient has an audio visit today for follow up of neck pain.  He is s/p T1-2 IL BATSHEVA on 8/20/20 with 75% relief.  He still has  some soreness from the procedure.  He still has some neck pain with intermittent burning in the arms.  He is able to move his head and neck better since the procedure.  He denies any recent respiratory symptoms.  He had updated imaging since previous visit as well.  His pain today is 4/10.    Interval History 07/29/20:  Patient reports that approximately 2 months ago he was laying on his sofa internus head in heard a crack in his neck.  Since that time, he has had worsening neck pain with radiation into his arms.  Additionally, he states that about 1 month ago he fell in his shower and landed onto his right shoulder and neck.  He denies any new onset of weakness in his upper extremities.  He denies any bowel or bladder changes.  He denies any recent respiratory symptoms.  He continues to take gabapentin and states that he has been taking 900 mg daily which does cause some drowsiness but does give him some benefit.  He has not been evaluated since previously mentioned trauma and would like updated imaging.  His pain today is 10/10.    Interval History 8/14/2019:  The patient is here for follow up of neck and arm pain.  We have not seen the patient since January.  At that time, he was scheduled for cervical SCS trial.  Unfortunately, this was denied by his insurance.  He previously had benefit with ESIs but they stopped being very effective.  He has completed PT in th past with limited benefit.  He saw neurosurgery in 2017 and surgery was not recommended.  He is taking Gabapentin 300 mg- 4 pills per day (1200 mg daily).  He says that his pain has been effecting his ability and function at work.  He is asking about being on leave or disability.  His pain today is 10/10.  The patient denies any bowel or bladder incontinence or signs of saddle paresthesia.  The patient denies any major medical changes since last office visit.     Interval History 1/18/2019:  The patient returns for follow up.  He had T1-2 IL BATSHEVA on  1/8/19.  He is reporting minimal benefit.  Since the procedure, he feels as though he has had a metallic taste.  He states that this has happened before with injections.  He denies any new weakness.  He denies bowel or bladder incontinence.  He is scheduled for a cervical MRI on 1/23/19.  His pain today is 10/10.       Interval History 12/26/2018:  The patient presents for injection follow up.  He is s/p repeat T1-2 IL BATSHEVA on 12/4/18 with about 60% relief.  He is reporting pain to the neck and into the arms still.  Last year, he required 2 ESIs for significant benefit.  He would like to schedule a repeat.  His pain today is 9/10.     Interval History 11/13/2018:  The patient returns for follow up of neck pain.  He previously was doing well after T1-T2 IL BATSHEVA x2 completed on 1/3/18.  He reports that he has been having increased pain for about one month.  The pain starts to the neck and radiates into both arms with associated numbness.  This is similar to previous pain.  He denies any new onset weakness.  He denies any b/b changes.  He has been taking Gabapentin 900 mg daily with limited benefit.  He has persistent numbness and burning.  His pain today is 10/10.     Interval History 4/26/2018:  The patient presents for follow up today.  He is still having benefit from previous ESIs.  He is happy with these results.  He continues to work and be active.  He increased Gabapentin to 900 mg at last OV which is helping.  His pain today is 0/10.     Interval History 1/25/2018:  The patient returns today for follow up of neck and arm pain.  He is s/p T1-T2 IL BATSHEVA x2 completed on 1/3/18 with 75% relief.  His pain is now mild.  He is still having numbness to his upper extremities and lower extremities.  He is currently taking Gabapentin 300 mg QHS.  His pain today is 0/10.     Initial encounter:  Heraclio Lam presents to the clinic for the evaluation of neck pain. The pain started years ago following picking up a  heavy object and symptoms have been worsening.  Patient has a history of ACDF at C6-7 and a history of posterior laminectomy of the cervical spine at the same levels.  In addition to his bilateral upper extremity radicular symptoms he does have bilateral lower extremity radicular symptoms.  Patient has not been having any signs of myelopathy        4/10/2024     9:32 AM   Last 3 PDI Scores   Pain Disability Index (PDI) 49       6 weeks of Conservative therapy:  PT: Completed  Chiro:  HEP: Participating      Treatments / Medications: (Ice/Heat/NSAIDS/APAP/etc):  Elavil 25mg - not taking   Advil 800 - not taking   Abhijit fontana - taking  Ember 300mg TID  Robaxin 500mg TID for day  Tizandine 4mg qHS    Antiplatelets/Anticoagulants:  No    Interventional Pain Procedures: (Previous injections)  03/14/24 L4/5 BL TFESI  08/20/20 T1-T2 ILESI  05/01/19 Canceled SCS trial  01/08/2019 T1/2 ILESI  12/04/2018 T1/2 ILESI  01/03/2018 T1/2 ILESI  12/22/2017 T1/2 ILESI  CDF at C6-7    IMAGING:    MRI SPINE CERVICAL-THORACIC-LUMBAR WITHOUT CONTRAST (XPD)  02/12/2024  Persistent syrinx extending from C5 through C7-T1.  No associated cord edema or expansion.    C3-C4: 3 mm of retrolisthesis.  Disc osteophyte complex effaces ventral thecal sac and compresses ventral spinal cord.  Bilateral uncovertebral joint spurring and bilateral ligamentum flavum hypertrophy.  Moderate spinal canal and lateral recess stenosis.  Moderate to severe bilateral neural foraminal narrowing.  C4-C5: disc osteophyte complex partially effaces the ventral thecal sac.  Bilateral uncovertebral joint spurring and bilateral ligamentum flavum hypertrophy.  Mild spinal canal stenosis.  Mild right and mild-to-moderate left neural foraminal narrowing.  C5-C6: osteophyte complex partially effaces the ventral thecal sac.  Bilateral facet arthropathy.  Mild spinal canal stenosis.  Mild left neural foraminal narrowing.  C6-C7: postoperative change. Posterior disc osteophyte  complex partially effaces the ventral thecal sac.  Left uncovertebral joint spurring.  No spinal canal stenosis.  Mild left neural foraminal narrowing.  C7-T1: Bilateral facet arthropathy.  No spinal canal stenosis.  Mild-to-moderate left neural foraminal narrowing.     Thoracic spine:  T2-T3: disc osteophyte complex   Bilateral LF hypertrophy.  Mild spinal stenosis.  No neural foraminal narrowing.  T3-T4: disc osteophyte complex + left subarticular/foraminal disc osteophyte complex.  Moderate left lateral recess stenosis.  Mild left neural foraminal narrowing.  T4-T5: Left subarticular/foraminal disc osteophyte complex.  Moderate left lateral recess stenosis.  Mild left neural foraminal narrowing.  T8-T9: Central/right subarticular disc osteophyte protrusion effaces the ventral thecal sac and causes mass effect on the ventral spinal cord and right lateral recess.  Bilateral ligamentum flavum hypertrophy.  Moderate spinal right lateral recess stenosis.  No neural foraminal narrowing.  T10-T11: Circumferential disc osteophyte complex causes mass effect on the ventral thecal sac and left lateral recess.  Bilateral facet arthropathy and bilateral LF hypertrophy.  Mild-to-moderate spinal canal and left lateral recess stenosis.  Mild left neural foraminal narrowing  T11-T12: Left foraminal/extraforaminal disc osteophyte complex.  No spinal canal stenosis.  Mild left neural foraminal narrowing.    Lumbar spine:  L3-L4: Bilateral facet arthropathy and bilateral LF hypertrophy.  Moderate to severe spinal canal and lateral recess stenosis.  Mild right and mild-to-moderate left neural foraminal narrowing.  L4-L5: Bilateral facet arthropathy and bilateral LF hypertrophy.  Severe spinal canal and lateral recess stenosis.  Moderate to severe bilateral neural foraminal narrowing.  L5-S1: Posterior broad-based disc protrusion causes mass effect on the ventral thecal sac and lateral recesses.  No spinal canal stenosis.  No neural  foraminal narrowing.        Past Surgical History:   Procedure Laterality Date    COLONOSCOPY N/A 11/29/2022    Procedure: COLONOSCOPY;  Surgeon: Cody Valle MD;  [facility]  Service: Endoscopy;  Laterality: N/A;  instructions via portal and mail - sm  11/22 pre call left message -     CSF SHUNT      EPIDURAL STEROID INJECTION N/A 12/4/2018    Procedure: Injection, Steroid, Epidural CERVICAL T1-2 INTERLAMINAR BATSHEVA;  Surgeon: Elba Juarez MD;  Location: [facility] PAIN MGT;  Service: Pain Management;  Laterality: N/A;    EPIDURAL STEROID INJECTION N/A 1/8/2019    Procedure: Injection, Steroid, Epidural CERVICAL T1-2 IL BATSHEVA;  Surgeon: Elba Juarez MD;  Location: [facility] PAIN MGT;  Service: Pain Management;  Laterality: N/A;    EPIDURAL STEROID INJECTION N/A 8/20/2020    Procedure: INJECTION, STEROID, EPIDURAL T1-2 IL BATSHEVA;  Surgeon: Elba Juarez MD;  Location: [facility] PAIN MGT;  Service: Pain Management;  Laterality: N/A;  T1-2 IL BATSHEVA   consent needed    EPIDURAL STEROID INJECTION INTO LUMBAR SPINE Bilateral 3/14/2024    Procedure: B/L L4-5 TFESI;  Surgeon: Karson Longoria MD;  Location: [facility] PAIN MANAGEMENT;  Service: Pain Management;  Laterality: Bilateral;  20 mins    JOINT REPLACEMENT  2006    disc Sx , cervical    SPINE SURGERY         Social History     Socioeconomic History    Marital status: Single   Occupational History    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   Tobacco Use    Smoking status: Never    Smokeless tobacco: Never   Substance and Sexual Activity    Alcohol use: Not Currently    Drug use: No     Social Determinants of Health     Financial Resource Strain: Low Risk  (3/3/2024)    Overall Financial Resource Strain (CARDIA)     Difficulty of Paying Living Expenses: Not hard at all   Food Insecurity: No Food Insecurity (3/3/2024)    Hunger Vital Sign     Worried About Running Out of Food in the Last Year: Never true     Ran Out of Food in the Last Year: Never true  "  Transportation Needs: No Transportation Needs (3/3/2024)    PRAPARE - Transportation     Lack of Transportation (Medical): No     Lack of Transportation (Non-Medical): No   Physical Activity: Insufficiently Active (3/3/2024)    Exercise Vital Sign     Days of Exercise per Week: 7 days     Minutes of Exercise per Session: 20 min   Stress: Stress Concern Present (3/3/2024)    [employer] - Occupational Stress Questionnaire     Feeling of Stress : Very much   Social Connections: Unknown (3/3/2024)    Social Connection and Isolation Panel [NHANES]     Frequency of Communication with Friends and Family: More than three times a week     Frequency of Social Gatherings with Friends and Family: More than three times a week     Active Member of Clubs or Organizations: No     Attends Club or Organization Meetings: Never     Marital Status: Never    Housing Stability: Low Risk  (3/3/2024)    Housing Stability Vital Sign     Unable to Pay for Housing in the Last Year: No     Number of Places Lived in the Last Year: 1     Unstable Housing in the Last Year: No       Medications/Allergies: See med card    ROS:  GENERAL: No fever. No chills. No fatigue. Denies weight loss. Denies weight gain.  Back / musculoskeletal / neuro : See HPI    VITALS:   Vitals:    04/10/24 0931   BP: (!) 151/80   Pulse: (!) 57   Weight: 91.6 kg (201 lb 15.1 oz)   Height: 5' 9" (1.753 m)   PainSc:   9   PainLoc: Back         Body mass index is 29.82 kg/m².      4/10/2024     9:32 AM 2/28/2024     1:58 PM 1/29/2024     8:11 AM   Last 3 PDI Scores   Pain Disability Index (PDI) 49 70 63       PHYSICAL EXAM:   GENERAL: Well appearing, in no acute distress, alert and oriented x3.  PSYCH:  Mood and affect appropriate.  SKIN: Skin color, texture, turgor normal, no rashes or lesions.  HEENT:  Normocephalic, atraumatic. Cranial nerves grossly intact.  NECK:  Tight cervical paraspinal and trapezius muscles.  Spurling's when moves " neck to the right.  Positive axial loading bilaterally.  PULM: No evidence of respiratory difficulty, symmetric chest rise.  GI:  Non-distended  BACK:  Limited range of motion due to pain.  No pain to palpation over the spinous processes. No pain to palpation over facet joints. There is no pain with palpation over the sacroiliac joints bilaterally.   EXTREMITIES: No deformities, edema, or skin discoloration.   MUSCULOSKELETAL: Shoulder, hip, and knee provocative maneuvers are negative. No atrophy is noted.  NEURO: Sensation is equal and appropriate bilaterally. Bilateral upper and lower extremity strength is normal and symmetric. Bilateral upper and lower extremity coordination and muscle stretch reflexes are physiologic and symmetric. Plantar response are downgoing. Straight leg raising in the supine position is negative to radicular pain.   GAIT: normal.      LABS:    Lab Results   Component Value Date    HGBA1C 5.1 03/06/2023       Lab Results   Component Value Date    WBC 6.53 03/06/2023    HGB 15.5 03/06/2023    HCT 44.7 03/06/2023    MCV 90 03/06/2023     03/06/2023       ASSESSMENT: 53 y.o. year old male with pain, consistent with:    Encounter Diagnoses   Name Primary?    Lumbar radiculopathy Yes    DDD (degenerative disc disease), lumbosacral     Cervical stenosis of spinal canal        DISCUSSION: Heraclio Lam is a patient disabled from work accident, prior patient of Dr. Juarez who comes to us with mid thoracic pain that radiates to his shoulders and occ down to his legs bilaterally with noted multilevel cervical, thoracic and lumbar stenosis. Patient achieved some relief of his LE symptoms with TFESI.  Has an ACDF pending.      PLAN:  - I have stressed the importance of physical activity and a home exercise plan to help with pain and improve health.  - Patient can continue with medications for now since they are providing benefits, using them appropriately, and without side effects.  -  Counseled patient regarding the importance of activity modification and constant sleeping habits.  - Medications: Gabapentin 300mg will increase nighttime dose to 600mg for a 1/1/2 schedule.    - Imaging: Reviewed available imaging with patient and answered any questions they had regarding study.  - The patient's pathophysiology was explained in detail with reference to x-rays, models, other visual aids as appropriate.   - Patient has ACSF planned on 05/0824.   - Follow up visit: return to clinic 6 - 8 weeks after ACDF.         Karson Longoria MD  04/10/2024

## 2024-04-10 ENCOUNTER — OFFICE VISIT (OUTPATIENT)
Dept: PAIN MEDICINE | Facility: CLINIC | Age: 54
End: 2024-04-10
Payer: COMMERCIAL

## 2024-04-10 ENCOUNTER — OFFICE VISIT (OUTPATIENT)
Dept: INTERNAL MEDICINE | Facility: CLINIC | Age: 54
End: 2024-04-10
Payer: COMMERCIAL

## 2024-04-10 ENCOUNTER — LAB VISIT (OUTPATIENT)
Dept: LAB | Facility: HOSPITAL | Age: 54
End: 2024-04-10
Attending: INTERNAL MEDICINE
Payer: COMMERCIAL

## 2024-04-10 VITALS
OXYGEN SATURATION: 100 % | DIASTOLIC BLOOD PRESSURE: 80 MMHG | HEIGHT: 69 IN | WEIGHT: 201.94 LBS | BODY MASS INDEX: 29.91 KG/M2 | SYSTOLIC BLOOD PRESSURE: 130 MMHG | HEART RATE: 61 BPM

## 2024-04-10 VITALS
WEIGHT: 201.94 LBS | HEART RATE: 57 BPM | HEIGHT: 69 IN | DIASTOLIC BLOOD PRESSURE: 80 MMHG | SYSTOLIC BLOOD PRESSURE: 151 MMHG | BODY MASS INDEX: 29.91 KG/M2

## 2024-04-10 DIAGNOSIS — M48.02 CERVICAL STENOSIS OF SPINAL CANAL: ICD-10-CM

## 2024-04-10 DIAGNOSIS — E78.5 HYPERLIPIDEMIA, UNSPECIFIED HYPERLIPIDEMIA TYPE: ICD-10-CM

## 2024-04-10 DIAGNOSIS — M54.16 LUMBAR RADICULOPATHY: Primary | ICD-10-CM

## 2024-04-10 DIAGNOSIS — M96.1 CERVICAL POST-LAMINECTOMY SYNDROME: ICD-10-CM

## 2024-04-10 DIAGNOSIS — Z01.818 PREOP EXAMINATION: Primary | ICD-10-CM

## 2024-04-10 DIAGNOSIS — Z12.5 PROSTATE CANCER SCREENING: ICD-10-CM

## 2024-04-10 DIAGNOSIS — M54.12 CERVICAL RADICULOPATHY: ICD-10-CM

## 2024-04-10 DIAGNOSIS — M51.37 DDD (DEGENERATIVE DISC DISEASE), LUMBOSACRAL: ICD-10-CM

## 2024-04-10 DIAGNOSIS — M48.02 CERVICAL SPINAL STENOSIS: ICD-10-CM

## 2024-04-10 DIAGNOSIS — Z01.818 PREOP EXAMINATION: ICD-10-CM

## 2024-04-10 LAB
ALBUMIN SERPL BCP-MCNC: 4.6 G/DL (ref 3.5–5.2)
ALP SERPL-CCNC: 72 U/L (ref 55–135)
ALT SERPL W/O P-5'-P-CCNC: 32 U/L (ref 10–44)
ANION GAP SERPL CALC-SCNC: 9 MMOL/L (ref 8–16)
AST SERPL-CCNC: 17 U/L (ref 10–40)
BASOPHILS # BLD AUTO: 0.02 K/UL (ref 0–0.2)
BASOPHILS NFR BLD: 0.3 % (ref 0–1.9)
BILIRUB SERPL-MCNC: 0.9 MG/DL (ref 0.1–1)
BUN SERPL-MCNC: 17 MG/DL (ref 6–20)
CALCIUM SERPL-MCNC: 10.4 MG/DL (ref 8.7–10.5)
CHLORIDE SERPL-SCNC: 103 MMOL/L (ref 95–110)
CHOLEST SERPL-MCNC: 117 MG/DL (ref 120–199)
CHOLEST/HDLC SERPL: 3.3 {RATIO} (ref 2–5)
CO2 SERPL-SCNC: 28 MMOL/L (ref 23–29)
COMPLEXED PSA SERPL-MCNC: 0.71 NG/ML (ref 0–4)
CREAT SERPL-MCNC: 1 MG/DL (ref 0.5–1.4)
DIFFERENTIAL METHOD BLD: ABNORMAL
EOSINOPHIL # BLD AUTO: 0 K/UL (ref 0–0.5)
EOSINOPHIL NFR BLD: 0.4 % (ref 0–8)
ERYTHROCYTE [DISTWIDTH] IN BLOOD BY AUTOMATED COUNT: 11.7 % (ref 11.5–14.5)
EST. GFR  (NO RACE VARIABLE): >60 ML/MIN/1.73 M^2
ESTIMATED AVG GLUCOSE: 114 MG/DL (ref 68–131)
GLUCOSE SERPL-MCNC: 113 MG/DL (ref 70–110)
HBA1C MFR BLD: 5.6 % (ref 4–5.6)
HCT VFR BLD AUTO: 47.5 % (ref 40–54)
HDLC SERPL-MCNC: 35 MG/DL (ref 40–75)
HDLC SERPL: 29.9 % (ref 20–50)
HGB BLD-MCNC: 16.4 G/DL (ref 14–18)
IMM GRANULOCYTES # BLD AUTO: 0.01 K/UL (ref 0–0.04)
IMM GRANULOCYTES NFR BLD AUTO: 0.1 % (ref 0–0.5)
LDLC SERPL CALC-MCNC: 36 MG/DL (ref 63–159)
LYMPHOCYTES # BLD AUTO: 2.3 K/UL (ref 1–4.8)
LYMPHOCYTES NFR BLD: 30.3 % (ref 18–48)
MCH RBC QN AUTO: 31.4 PG (ref 27–31)
MCHC RBC AUTO-ENTMCNC: 34.5 G/DL (ref 32–36)
MCV RBC AUTO: 91 FL (ref 82–98)
MONOCYTES # BLD AUTO: 0.6 K/UL (ref 0.3–1)
MONOCYTES NFR BLD: 8.1 % (ref 4–15)
NEUTROPHILS # BLD AUTO: 4.6 K/UL (ref 1.8–7.7)
NEUTROPHILS NFR BLD: 60.8 % (ref 38–73)
NONHDLC SERPL-MCNC: 82 MG/DL
NRBC BLD-RTO: 0 /100 WBC
OHS QRS DURATION: 88 MS
OHS QTC CALCULATION: 391 MS
PLATELET # BLD AUTO: 163 K/UL (ref 150–450)
PMV BLD AUTO: 11.4 FL (ref 9.2–12.9)
POTASSIUM SERPL-SCNC: 4.2 MMOL/L (ref 3.5–5.1)
PROT SERPL-MCNC: 7.6 G/DL (ref 6–8.4)
RBC # BLD AUTO: 5.22 M/UL (ref 4.6–6.2)
SODIUM SERPL-SCNC: 140 MMOL/L (ref 136–145)
TRIGL SERPL-MCNC: 230 MG/DL (ref 30–150)
WBC # BLD AUTO: 7.5 K/UL (ref 3.9–12.7)

## 2024-04-10 PROCEDURE — 99214 OFFICE O/P EST MOD 30 MIN: CPT | Mod: S$GLB,,, | Performed by: INTERNAL MEDICINE

## 2024-04-10 PROCEDURE — 1160F RVW MEDS BY RX/DR IN RCRD: CPT | Mod: CPTII,S$GLB,, | Performed by: INTERNAL MEDICINE

## 2024-04-10 PROCEDURE — 85025 COMPLETE CBC W/AUTO DIFF WBC: CPT | Performed by: INTERNAL MEDICINE

## 2024-04-10 PROCEDURE — 99214 OFFICE O/P EST MOD 30 MIN: CPT | Mod: S$GLB,,, | Performed by: EMERGENCY MEDICINE

## 2024-04-10 PROCEDURE — 36415 COLL VENOUS BLD VENIPUNCTURE: CPT | Performed by: INTERNAL MEDICINE

## 2024-04-10 PROCEDURE — 83036 HEMOGLOBIN GLYCOSYLATED A1C: CPT | Performed by: INTERNAL MEDICINE

## 2024-04-10 PROCEDURE — 3008F BODY MASS INDEX DOCD: CPT | Mod: CPTII,S$GLB,, | Performed by: INTERNAL MEDICINE

## 2024-04-10 PROCEDURE — 99999 PR PBB SHADOW E&M-EST. PATIENT-LVL III: CPT | Mod: PBBFAC,,, | Performed by: INTERNAL MEDICINE

## 2024-04-10 PROCEDURE — 3077F SYST BP >= 140 MM HG: CPT | Mod: CPTII,S$GLB,, | Performed by: EMERGENCY MEDICINE

## 2024-04-10 PROCEDURE — 99999 PR PBB SHADOW E&M-EST. PATIENT-LVL III: CPT | Mod: PBBFAC,,, | Performed by: EMERGENCY MEDICINE

## 2024-04-10 PROCEDURE — 3079F DIAST BP 80-89 MM HG: CPT | Mod: CPTII,S$GLB,, | Performed by: INTERNAL MEDICINE

## 2024-04-10 PROCEDURE — 3075F SYST BP GE 130 - 139MM HG: CPT | Mod: CPTII,S$GLB,, | Performed by: INTERNAL MEDICINE

## 2024-04-10 PROCEDURE — 80053 COMPREHEN METABOLIC PANEL: CPT | Performed by: INTERNAL MEDICINE

## 2024-04-10 PROCEDURE — 3079F DIAST BP 80-89 MM HG: CPT | Mod: CPTII,S$GLB,, | Performed by: EMERGENCY MEDICINE

## 2024-04-10 PROCEDURE — 3008F BODY MASS INDEX DOCD: CPT | Mod: CPTII,S$GLB,, | Performed by: EMERGENCY MEDICINE

## 2024-04-10 PROCEDURE — 80061 LIPID PANEL: CPT | Performed by: INTERNAL MEDICINE

## 2024-04-10 PROCEDURE — 84153 ASSAY OF PSA TOTAL: CPT | Performed by: INTERNAL MEDICINE

## 2024-04-10 PROCEDURE — 1159F MED LIST DOCD IN RCRD: CPT | Mod: CPTII,S$GLB,, | Performed by: INTERNAL MEDICINE

## 2024-04-10 RX ORDER — GABAPENTIN 300 MG/1
CAPSULE ORAL
Qty: 120 CAPSULE | Refills: 0 | Status: SHIPPED | OUTPATIENT
Start: 2024-04-10 | End: 2024-05-10

## 2024-04-10 RX ORDER — ROSUVASTATIN CALCIUM 20 MG/1
20 TABLET, COATED ORAL
Qty: 90 TABLET | Refills: 0 | Status: SHIPPED | OUTPATIENT
Start: 2024-04-10

## 2024-04-10 NOTE — PROGRESS NOTES
53-year-old male    Internal medicine and preoperative evaluation, scheduled for cervical fusion and diskectomy C3-C4 with shunt, May 8th.  Has cervical spinal stenosis    Medical history   Hyperlipidemia  Cervical spondylosis with cervical stenosis.  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  Amitriptyline 25 mg q.h.s.   Rosuvastatin 20 mg daily    Review of symptoms  Reports no chest pain, palpitations, shortness for breath, abdominal pain.  Reports no active heartburn indigestion, will take over-the-counter Pepcid only as needed.  No chronic cough.  Regular bowel function.  No difficulty with urinating, urine flows fine no urgency or frequency or incomplete emptying.  No incontinence      Examination   Weight 201 lb   Pulse 64   Blood pressure by me 120/72   Neck no thyromegaly no masses, horizontal surgical scar from previous cervical spine surgery   Chest clear breath sounds good effort  Heart regular rate rhythm, no murmurs or gallops  Abdominal exam nontender, soft, no hepatosplenomegaly abdominal masses or bruits  Pulses 2+ carotid pulses no bruits, 2+ dorsalis pedal pulses  Extremities, no edema    Office ECG  normal sinus rhythm, no ischemic changes    Impression   Cervical spinal stenosis   Preop evaluation  Hyperlipidemia    Plan   Chemistry, CBC  Will also get lipid profile since s been a year of having lab testing as well as PSA for prostate cancer screening    Patient is cleared for anesthesia and surgery.  Could hold the rosuvastatin or the morning surgery and resume postop      Addendum   Test results reviewed.  All look fine.  My chart message sent to the patient

## 2024-04-11 ENCOUNTER — PATIENT MESSAGE (OUTPATIENT)
Dept: INTERNAL MEDICINE | Facility: CLINIC | Age: 54
End: 2024-04-11
Payer: COMMERCIAL

## 2024-04-11 NOTE — TELEPHONE ENCOUNTER
No care due was identified.  [facility] Embedded Care Due Messages. Reference number: [id number].   4/10/2024 9:50:34 PM CDT

## 2024-04-11 NOTE — TELEPHONE ENCOUNTER
Refill Decision Note   Heraclio Lam  is requesting a refill authorization.  Brief Assessment and Rationale for Refill:  Approve     Medication Therapy Plan:         Comments:     Note composed:10:41 PM 04/10/2024

## 2024-04-12 ENCOUNTER — TELEPHONE (OUTPATIENT)
Dept: ORTHOPEDICS | Facility: CLINIC | Age: 54
End: 2024-04-12
Payer: COMMERCIAL

## 2024-04-12 NOTE — TELEPHONE ENCOUNTER
Spoke with patient's mother and she confirmed patient will take the new surgery date of 5/2/24.    ----- Message from Maggie Keys sent at 4/12/2024  9:53 AM CDT -----  Regarding: Appt Access  Contact: pt [phone]  Jessy/mother returning call from missed call regarding earlier appt. Pls call

## 2024-04-17 DIAGNOSIS — Z01.818 PREOPERATIVE TESTING: Primary | ICD-10-CM

## 2024-04-17 NOTE — ANESTHESIA PAT ROS NOTE
04/17/2024  Heraclio Lam is a 53 y.o., male.      Pre-op Assessment          Review of Systems  Anesthesia Hx:  No problems with previous Anesthesia   History of prior surgery of interest to airway management or planning:          Denies Family Hx of Anesthesia complications.    Denies Personal Hx of Anesthesia complications.                    Social:  Non-Smoker, No Alcohol Use       Hematology/Oncology:  Hematology Normal   Oncology Normal                                   EENT/Dental:  EENT/Dental Normal           Cardiovascular:            Denies Angina.          Functional Capacity 4 METS, able to climb 2 flights of stairs                         Pulmonary:  Pulmonary Normal      Denies Shortness of breath.  Denies Recent URI.                 Renal/:  Renal/ Normal                 Hepatic/GI:     GERD     Esophageal / Stomach Disorders Gerd Controlled by chronic antireflux medication.         Musculoskeletal:   Musculoskeletal General/Symptoms:  Functional capacity is ambulatory without assistance.        Cervical Spine Disorder, Cervical Disc Disease, Radiculopathy, Myelopathy CERVICAL SPINAL STENOSIS, C-DDD  Lumbar Spine Disorders LUMBOSACRAL - DDD  Neurological:   Neuro Symptoms of pain NECK PAIN ZACKARY SHOULDER , BLE AND LBPI     Chronic Pain Syndrome                                  Anesthesia Assessment: Preoperative EQUATION    Planned Procedure: Procedure(s) (LRB):  DISCECTOMY, SPINE, CERVICAL, ANTERIOR APPROACH, WITH FUSION C3-4 SPINEWAVE SNS: VOCAL CORDS/MOTORS/SSEP (N/A)  Requested Anesthesia Type:General  Surgeon: Cosme Chaidez MD  Service: Orthopedics  Known or anticipated Date of Surgery:5/2/2024    Surgeon notes: reviewed    Electronic QUestionnaire Assessment completed via nurse interview with patient.        Triage considerations:     The patient has no apparent active  cardiac condition (No unstable coronary Syndrome such as severe unstable angina or recent [<1 month] myocardial infarction, decompensated CHF, severe valvular   disease or significant arrhythmia)    Previous anesthesia records:MAC and No problems  11/29/2022 COLONOSCOPY   Airway:  Mallampati: II   Mouth Opening: Normal  TM Distance: Normal  Tongue: Normal  Neck ROM: Normal ROM    ** NOTED IN EPIC: DECREASE ROM OF NECK**    Last PCP note: within 3 months , within [facility]   Subspecialty notes: Ortho, Pain Management    Other important co-morbidities: GERD and CERVICAL SPINAL STENOSIS       Tests already available:  Available tests,  within 1 month , within 3 months , within [facility] . 4/10/2024 HGA1C, CBC,CMP, 3/20/2024 CT CERVICAL SPINE W/O CONTRAST, XRAY CERVICAL SPINE AP/LAT W F/E, 2/12/2024 MRI SPINE C-T-L W/O CONTRAST            Instructions given. (See in Nurse's note)    Optimization:  Anesthesia Preop Clinic Assessment - not Indicated for this surgery    Medical Opinion Indicated           Plan:    Testing:  PT/INR and T&S      Consultation:Patient's PCP for re-evaluation     Patient  has previously scheduled Medical Appointment:none    Navigation: Tests Scheduled. TBD             Consults scheduled.4/10             Results will be tracked by Preop Clinic.  4/17 Medical clearance given by Dr.Joseph Armstrong on  4/10:Patient is cleared for anesthesia and surgery. Could hold the rosuvastatin or the morning surgery and resume postop   4/23 Labs resulted and noted by Dr. Alton Zaman.

## 2024-04-17 NOTE — PRE-PROCEDURE INSTRUCTIONS
Spoke with patient's mother, Jessy Lam.  She handles all of his medical. She said he cannot understand everything. Instructed to get an involvement in care form with next clinic or hospital visit.Has gotten medical clearance from PCP, Dr. Delano Armstrong on 4/10. Will need T&S and PT/INR.  Our  will call to set up this appt.     Preop instructions given. Hold aspirin, aspirin containing products, nsaids(aleve, advil, motrin, ibuprofen, naprosyn, naproxen, voltaren, diclofenac), vitamins and supplements one week prior to surgery.   May take Tylenol.        Disp Refills Start End   amitriptyline (ELAVIL) 25 MG tablet 30 tablet 3 3/3/2023 4/10/2024   Sig: Take 1 tablet (25 mg total) by mouth nightly as needed for Insomnia.   Route: Marcie Payan RN 4/17/2024  1:26 PM  TAKE IF NEEDED  THE NIGHT BEFORE SURGERY.              famotidine (PEPCID) 20 MG tablet 180 tablet 3 10/4/2022 4/10/2024   Sig: Take 1 tablet (20 mg total) by mouth 2 (two) times daily.   Class: Print   Route: Marcie Payan RN 4/17/2024  1:26 PM  TAKE IN AM OF SURGERY.              gabapentin (NEURONTIN) 300 MG capsule 120 capsule 0 4/10/2024 5/10/2024   Sig: Take 1 capsule (300 mg total) by mouth 2 (two) times daily AND 2 capsules (600 mg total) every evening.   Route: Marcie Payan RN 4/17/2024  1:27 PM  TAKE IN AM OF SURGERY.              ibuprofen (ADVIL,MOTRIN) 200 MG tablet -- --  --   Sig: Take 200 mg by mouth every 6 (six) hours as needed for Pain.   Class: Historical Med   Route: Marcie Payan RN 4/17/2024  1:26 PM  HOLD ONE WEEK PRIOR TO SURGERY.              rosuvastatin (CRESTOR) 20 MG tablet 90 tablet 0 4/10/2024 --   Sig: TAKE 1 TABLET BY MOUTH ONCE  DAILY   Route: Marcie Payan RN 4/17/2024  1:27 PM  TAKE IN AM OF SURGERY.               Your surgery has been scheduled for:_Thursday 5/2/2024_________________________________________  [facility] Jaden Jack [phone]  You  should report to:  _X__[facility], Building A  ____[facility], located on the [place] side of the first floor of the           [facility] ([phone])  ____[facility], located on the [facility] side of the            Second floor of the [facility] ([phone])  ____3rd Floor [facility] located on the [facility] side of the [facility] [phone]  Please Note   Tell your doctor if you take Aspirin, products containing Aspirin, herbal medications  or blood thinners, such as Coumadin, Ticlid, or Plavix.  (Consult your provider regarding holding or stopping before surgery).  Arrange for someone to drive you home following surgery.  You will not be allowed to leave the surgical facility alone or drive yourself home following sedation and anesthesia.  Before Surgery  Stop taking all vitamins/ herbal medications 14days prior to surgery  No Motrin/Advil (Ibuprofen) 7 days before surgery  No Aleve (Naproxen) 7 days before surgery  Stop Taking Asprin, products containing Asprin __7___days before surgery  Stop taking blood thinners_______days before surgery  No Goody's/BC  Powder 7 days before surgery  Refrain from drinking alcoholic beverages for 24hours before and after surgery  Stop or limit smoking _________days before surgery( nonsmoker)  You may take Tylenol for pain  Night before Surgery  Nothing to eat or drink after midnight.  Take a shower or bath (shower is recommended).  Bathe with Hibiclens soap or an antibacterial soap from the neck down.  If not supplied by your surgeon, hibiclens soap will need to be purchased over the counter in pharmacy.  Rinse soap off thoroughly.  Shampoo your hair with your regular shampoo  The Day of Surgery  NOTHING TO  DRINK 2 hours before arrival time. If you are told to take medication on the morning of surgery, it may be taken with a sip of water.   Take  another bath or shower with hibiclens or any antibacterial soap, to reduce the chance of infection.  Take heart and blood pressure medications with a small sip of water, as advised by the perioperative team.  Do not take fluid pills  You may brush your teeth and rinse your mouth, but do not swall any additional water.   Do not apply perfumes, powder, body lotions or deodorant on the day of surgery.  Nail polish should be removed.  Do not wear makeup or moisturizer  Wear comfortable clothes, such as a button front shirt and loose fitting pants.  Leave all jewelry, including body piercings, and valuables at home.    Bring any devices you will neeed after surgery such as crutches or canes.  If you have sleep apnea, please bring your CPAP machine  In the event that your physical condition changes including the onset of a cold or respiratory illness, or if you have to delay or cancel your surgery, please notify your surgeon.   Directions to the surgery center given. Verbalizes understanding. Also sent preop and med instructions to [facility] portal.

## 2024-04-18 ENCOUNTER — TELEPHONE (OUTPATIENT)
Dept: PREADMISSION TESTING | Facility: HOSPITAL | Age: 54
End: 2024-04-18
Payer: COMMERCIAL

## 2024-04-22 ENCOUNTER — LAB VISIT (OUTPATIENT)
Dept: LAB | Facility: HOSPITAL | Age: 54
End: 2024-04-22
Attending: ANESTHESIOLOGY
Payer: COMMERCIAL

## 2024-04-22 DIAGNOSIS — Z01.818 PREOPERATIVE TESTING: ICD-10-CM

## 2024-04-22 LAB
ABO + RH BLD: NORMAL
BLD GP AB SCN CELLS X3 SERPL QL: NORMAL
INR PPP: 1 (ref 0.8–1.2)
PROTHROMBIN TIME: 10.9 SEC (ref 9–12.5)
SPECIMEN OUTDATE: NORMAL

## 2024-04-22 PROCEDURE — 86901 BLOOD TYPING SEROLOGIC RH(D): CPT | Performed by: ANESTHESIOLOGY

## 2024-04-22 PROCEDURE — 85610 PROTHROMBIN TIME: CPT | Performed by: ANESTHESIOLOGY

## 2024-04-29 ENCOUNTER — ANESTHESIA EVENT (OUTPATIENT)
Dept: SURGERY | Facility: HOSPITAL | Age: 54
End: 2024-04-29
Payer: COMMERCIAL

## 2024-05-01 ENCOUNTER — TELEPHONE (OUTPATIENT)
Dept: ORTHOPEDIC SURGERY | Facility: CLINIC | Age: 54
End: 2024-05-01
Payer: COMMERCIAL

## 2024-05-01 RX ORDER — CELECOXIB 100 MG/1
100 CAPSULE ORAL 2 TIMES DAILY
Qty: 28 CAPSULE | Refills: 0 | Status: SHIPPED | OUTPATIENT
Start: 2024-05-01 | End: 2024-05-17

## 2024-05-01 RX ORDER — METHOCARBAMOL 500 MG/1
1000 TABLET, FILM COATED ORAL 3 TIMES DAILY
Qty: 180 TABLET | Refills: 0 | Status: SHIPPED | OUTPATIENT
Start: 2024-05-01 | End: 2024-06-02

## 2024-05-01 RX ORDER — OXYCODONE HYDROCHLORIDE 5 MG/1
5 TABLET ORAL EVERY 6 HOURS PRN
Qty: 28 TABLET | Refills: 0 | Status: SHIPPED | OUTPATIENT
Start: 2024-05-01

## 2024-05-01 RX ORDER — ACETAMINOPHEN 500 MG
1000 TABLET ORAL EVERY 8 HOURS
Qty: 60 TABLET | Refills: 0 | Status: SHIPPED | OUTPATIENT
Start: 2024-05-01

## 2024-05-01 NOTE — TELEPHONE ENCOUNTER
Spoke to pt's mother, informed his arrival time for surgery tomorrow is 9:00am at the [place] location, [address]. No food or drink after midnight. Reminded that pt is to shower with Hibclens antibacterial soap tonight and tomorrow morning. Pt's mom pleased and verbalized understanding.

## 2024-05-02 ENCOUNTER — HOSPITAL ENCOUNTER (OUTPATIENT)
Facility: HOSPITAL | Age: 54
Discharge: HOME | End: 2024-05-03
Attending: ORTHOPAEDIC SURGERY | Admitting: ORTHOPAEDIC SURGERY
Payer: COMMERCIAL

## 2024-05-02 ENCOUNTER — ANESTHESIA (OUTPATIENT)
Dept: SURGERY | Facility: HOSPITAL | Age: 54
End: 2024-05-02
Payer: COMMERCIAL

## 2024-05-02 DIAGNOSIS — M47.22 CERVICAL SPONDYLOSIS WITH RADICULOPATHY: Primary | ICD-10-CM

## 2024-05-02 DIAGNOSIS — G95.9 CERVICAL MYELOPATHY: ICD-10-CM

## 2024-05-02 PROCEDURE — 20930 SP BONE ALGRFT MORSEL ADD-ON: CPT | Mod: ,,, | Performed by: ORTHOPAEDIC SURGERY

## 2024-05-02 PROCEDURE — 25000003 PHARM REV CODE 250

## 2024-05-02 PROCEDURE — C1713 ANCHOR/SCREW BN/BN,TIS/BN: HCPCS | Performed by: ORTHOPAEDIC SURGERY

## 2024-05-02 PROCEDURE — 71000033 HC RECOVERY, INTIAL HOUR: Performed by: ORTHOPAEDIC SURGERY

## 2024-05-02 PROCEDURE — D9220A PRA ANESTHESIA: Mod: ANES,,, | Performed by: STUDENT IN AN ORGANIZED HEALTH CARE EDUCATION/TRAINING PROGRAM

## 2024-05-02 PROCEDURE — 63600175 PHARM REV CODE 636 W HCPCS: Performed by: NURSE ANESTHETIST, CERTIFIED REGISTERED

## 2024-05-02 PROCEDURE — 63600175 PHARM REV CODE 636 W HCPCS: Performed by: ANESTHESIOLOGY

## 2024-05-02 PROCEDURE — 22845 INSERT SPINE FIXATION DEVICE: CPT | Mod: 59,,, | Performed by: ORTHOPAEDIC SURGERY

## 2024-05-02 PROCEDURE — 20936 SP BONE AGRFT LOCAL ADD-ON: CPT | Mod: ,,, | Performed by: ORTHOPAEDIC SURGERY

## 2024-05-02 PROCEDURE — 25000003 PHARM REV CODE 250: Performed by: ORTHOPAEDIC SURGERY

## 2024-05-02 PROCEDURE — 99900035 HC TECH TIME PER 15 MIN (STAT)

## 2024-05-02 PROCEDURE — C1729 CATH, DRAINAGE: HCPCS | Performed by: ORTHOPAEDIC SURGERY

## 2024-05-02 PROCEDURE — 25000003 PHARM REV CODE 250: Performed by: ANESTHESIOLOGY

## 2024-05-02 PROCEDURE — C1769 GUIDE WIRE: HCPCS | Performed by: ORTHOPAEDIC SURGERY

## 2024-05-02 PROCEDURE — 37000008 HC ANESTHESIA 1ST 15 MINUTES: Performed by: ORTHOPAEDIC SURGERY

## 2024-05-02 PROCEDURE — 27800903 OPTIME MED/SURG SUP & DEVICES OTHER IMPLANTS: Performed by: ORTHOPAEDIC SURGERY

## 2024-05-02 PROCEDURE — 36000711: Performed by: ORTHOPAEDIC SURGERY

## 2024-05-02 PROCEDURE — 25000003 PHARM REV CODE 250: Performed by: NURSE ANESTHETIST, CERTIFIED REGISTERED

## 2024-05-02 PROCEDURE — 97530 THERAPEUTIC ACTIVITIES: CPT

## 2024-05-02 PROCEDURE — 36000710: Performed by: ORTHOPAEDIC SURGERY

## 2024-05-02 PROCEDURE — 63600175 PHARM REV CODE 636 W HCPCS: Performed by: ORTHOPAEDIC SURGERY

## 2024-05-02 PROCEDURE — 37000009 HC ANESTHESIA EA ADD 15 MINS: Performed by: ORTHOPAEDIC SURGERY

## 2024-05-02 PROCEDURE — 22551 ARTHRD ANT NTRBDY CERVICAL: CPT | Mod: 22,,, | Performed by: ORTHOPAEDIC SURGERY

## 2024-05-02 PROCEDURE — 94761 N-INVAS EAR/PLS OXIMETRY MLT: CPT

## 2024-05-02 PROCEDURE — 63600175 PHARM REV CODE 636 W HCPCS

## 2024-05-02 PROCEDURE — 22853 INSJ BIOMECHANICAL DEVICE: CPT | Mod: ,,, | Performed by: ORTHOPAEDIC SURGERY

## 2024-05-02 PROCEDURE — D9220A PRA ANESTHESIA: Mod: CRNA,,, | Performed by: NURSE ANESTHETIST, CERTIFIED REGISTERED

## 2024-05-02 PROCEDURE — 27201423 OPTIME MED/SURG SUP & DEVICES STERILE SUPPLY: Performed by: ORTHOPAEDIC SURGERY

## 2024-05-02 PROCEDURE — 97161 PT EVAL LOW COMPLEX 20 MIN: CPT

## 2024-05-02 PROCEDURE — C1889 IMPLANT/INSERT DEVICE, NOC: HCPCS | Performed by: ORTHOPAEDIC SURGERY

## 2024-05-02 PROCEDURE — 94799 UNLISTED PULMONARY SVC/PX: CPT

## 2024-05-02 DEVICE — SPACER ACCENT 6 DEG 12X14X7MM: Type: IMPLANTABLE DEVICE | Site: SPINE CERVICAL | Status: FUNCTIONAL

## 2024-05-02 DEVICE — PUTTY OSTEOSELECT IN SYR 1CC: Type: IMPLANTABLE DEVICE | Site: SPINE CERVICAL | Status: FUNCTIONAL

## 2024-05-02 DEVICE — IMPLANTABLE DEVICE: Type: IMPLANTABLE DEVICE | Site: SPINE CERVICAL | Status: FUNCTIONAL

## 2024-05-02 DEVICE — SCREW DEFENDER CERV 18X4MM: Type: IMPLANTABLE DEVICE | Site: SPINE CERVICAL | Status: FUNCTIONAL

## 2024-05-02 DEVICE — SCREW DEFENDER SD 4X16MM: Type: IMPLANTABLE DEVICE | Site: SPINE CERVICAL | Status: FUNCTIONAL

## 2024-05-02 RX ORDER — HYDROMORPHONE HYDROCHLORIDE 1 MG/ML
0.2 INJECTION, SOLUTION INTRAMUSCULAR; INTRAVENOUS; SUBCUTANEOUS EVERY 5 MIN PRN
Status: DISCONTINUED | OUTPATIENT
Start: 2024-05-02 | End: 2024-05-02 | Stop reason: HOSPADM

## 2024-05-02 RX ORDER — POLYETHYLENE GLYCOL 3350 17 G/17G
17 POWDER, FOR SOLUTION ORAL DAILY
Status: DISCONTINUED | OUTPATIENT
Start: 2024-05-03 | End: 2024-05-03 | Stop reason: HOSPADM

## 2024-05-02 RX ORDER — CELECOXIB 200 MG/1
400 CAPSULE ORAL ONCE
Status: COMPLETED | OUTPATIENT
Start: 2024-05-02 | End: 2024-05-02

## 2024-05-02 RX ORDER — DEXAMETHASONE SODIUM PHOSPHATE 4 MG/ML
INJECTION, SOLUTION INTRA-ARTICULAR; INTRALESIONAL; INTRAMUSCULAR; INTRAVENOUS; SOFT TISSUE
Status: DISCONTINUED | OUTPATIENT
Start: 2024-05-02 | End: 2024-05-02

## 2024-05-02 RX ORDER — FAMOTIDINE 20 MG/1
20 TABLET, FILM COATED ORAL 2 TIMES DAILY
Status: DISCONTINUED | OUTPATIENT
Start: 2024-05-02 | End: 2024-05-03 | Stop reason: HOSPADM

## 2024-05-02 RX ORDER — TALC
6 POWDER (GRAM) TOPICAL NIGHTLY PRN
Status: DISCONTINUED | OUTPATIENT
Start: 2024-05-02 | End: 2024-05-03 | Stop reason: HOSPADM

## 2024-05-02 RX ORDER — LIDOCAINE HYDROCHLORIDE 20 MG/ML
INJECTION INTRAVENOUS
Status: DISCONTINUED | OUTPATIENT
Start: 2024-05-02 | End: 2024-05-02

## 2024-05-02 RX ORDER — BISACODYL 10 MG/1
10 SUPPOSITORY RECTAL DAILY PRN
Status: DISCONTINUED | OUTPATIENT
Start: 2024-05-02 | End: 2024-05-03 | Stop reason: HOSPADM

## 2024-05-02 RX ORDER — FENTANYL CITRATE 50 UG/ML
INJECTION, SOLUTION INTRAMUSCULAR; INTRAVENOUS
Status: DISCONTINUED | OUTPATIENT
Start: 2024-05-02 | End: 2024-05-02

## 2024-05-02 RX ORDER — PROPOFOL 10 MG/ML
VIAL (ML) INTRAVENOUS CONTINUOUS PRN
Status: DISCONTINUED | OUTPATIENT
Start: 2024-05-02 | End: 2024-05-02

## 2024-05-02 RX ORDER — PROCHLORPERAZINE EDISYLATE 5 MG/ML
5 INJECTION INTRAMUSCULAR; INTRAVENOUS EVERY 30 MIN PRN
Status: DISCONTINUED | OUTPATIENT
Start: 2024-05-02 | End: 2024-05-02 | Stop reason: HOSPADM

## 2024-05-02 RX ORDER — ONDANSETRON 8 MG/1
8 TABLET, ORALLY DISINTEGRATING ORAL EVERY 8 HOURS PRN
Status: DISCONTINUED | OUTPATIENT
Start: 2024-05-02 | End: 2024-05-03 | Stop reason: HOSPADM

## 2024-05-02 RX ORDER — DOCUSATE SODIUM 100 MG/1
100 CAPSULE, LIQUID FILLED ORAL 2 TIMES DAILY
Status: DISCONTINUED | OUTPATIENT
Start: 2024-05-02 | End: 2024-05-03 | Stop reason: HOSPADM

## 2024-05-02 RX ORDER — HYDROMORPHONE HYDROCHLORIDE 1 MG/ML
0.2 INJECTION, SOLUTION INTRAMUSCULAR; INTRAVENOUS; SUBCUTANEOUS
Status: DISCONTINUED | OUTPATIENT
Start: 2024-05-02 | End: 2024-05-03 | Stop reason: HOSPADM

## 2024-05-02 RX ORDER — HALOPERIDOL 5 MG/ML
0.5 INJECTION INTRAMUSCULAR EVERY 10 MIN PRN
Status: COMPLETED | OUTPATIENT
Start: 2024-05-02 | End: 2024-05-02

## 2024-05-02 RX ORDER — KETAMINE HCL IN 0.9 % NACL 50 MG/5 ML
SYRINGE (ML) INTRAVENOUS
Status: DISCONTINUED | OUTPATIENT
Start: 2024-05-02 | End: 2024-05-02

## 2024-05-02 RX ORDER — ONDANSETRON HYDROCHLORIDE 2 MG/ML
INJECTION, SOLUTION INTRAVENOUS
Status: DISCONTINUED | OUTPATIENT
Start: 2024-05-02 | End: 2024-05-02

## 2024-05-02 RX ORDER — FAMOTIDINE 10 MG/ML
INJECTION INTRAVENOUS
Status: DISCONTINUED | OUTPATIENT
Start: 2024-05-02 | End: 2024-05-02

## 2024-05-02 RX ORDER — CARBOXYMETHYLCELLULOSE SODIUM 10 MG/ML
GEL OPHTHALMIC
Status: DISCONTINUED | OUTPATIENT
Start: 2024-05-02 | End: 2024-05-02

## 2024-05-02 RX ORDER — PROPOFOL 10 MG/ML
VIAL (ML) INTRAVENOUS
Status: DISCONTINUED | OUTPATIENT
Start: 2024-05-02 | End: 2024-05-02

## 2024-05-02 RX ORDER — PHENYLEPHRINE HYDROCHLORIDE 10 MG/ML
INJECTION INTRAVENOUS
Status: DISCONTINUED | OUTPATIENT
Start: 2024-05-02 | End: 2024-05-02

## 2024-05-02 RX ORDER — MUPIROCIN 20 MG/G
OINTMENT TOPICAL 2 TIMES DAILY
Status: DISCONTINUED | OUTPATIENT
Start: 2024-05-02 | End: 2024-05-03 | Stop reason: HOSPADM

## 2024-05-02 RX ORDER — ACETAMINOPHEN 325 MG/1
650 TABLET ORAL
Status: DISCONTINUED | OUTPATIENT
Start: 2024-05-02 | End: 2024-05-03 | Stop reason: HOSPADM

## 2024-05-02 RX ORDER — EPHEDRINE SULFATE 50 MG/ML
INJECTION, SOLUTION INTRAVENOUS
Status: DISCONTINUED | OUTPATIENT
Start: 2024-05-02 | End: 2024-05-02

## 2024-05-02 RX ORDER — METHOCARBAMOL 500 MG/1
1000 TABLET, FILM COATED ORAL ONCE
Status: COMPLETED | OUTPATIENT
Start: 2024-05-02 | End: 2024-05-02

## 2024-05-02 RX ORDER — ACETAMINOPHEN 500 MG
1000 TABLET ORAL ONCE
Status: COMPLETED | OUTPATIENT
Start: 2024-05-02 | End: 2024-05-02

## 2024-05-02 RX ORDER — OXYCODONE HYDROCHLORIDE 10 MG/1
10 TABLET ORAL EVERY 4 HOURS PRN
Status: DISCONTINUED | OUTPATIENT
Start: 2024-05-02 | End: 2024-05-03 | Stop reason: HOSPADM

## 2024-05-02 RX ORDER — SODIUM CHLORIDE 9 MG/ML
INJECTION, SOLUTION INTRAVENOUS CONTINUOUS
Status: DISCONTINUED | OUTPATIENT
Start: 2024-05-02 | End: 2024-05-03 | Stop reason: HOSPADM

## 2024-05-02 RX ORDER — PROMETHAZINE HYDROCHLORIDE 25 MG/1
25 TABLET ORAL EVERY 6 HOURS PRN
Status: DISCONTINUED | OUTPATIENT
Start: 2024-05-02 | End: 2024-05-03 | Stop reason: HOSPADM

## 2024-05-02 RX ORDER — SUCCINYLCHOLINE CHLORIDE 20 MG/ML
INJECTION INTRAMUSCULAR; INTRAVENOUS
Status: DISCONTINUED | OUTPATIENT
Start: 2024-05-02 | End: 2024-05-02

## 2024-05-02 RX ORDER — MIDAZOLAM HYDROCHLORIDE 1 MG/ML
INJECTION INTRAMUSCULAR; INTRAVENOUS
Status: DISCONTINUED | OUTPATIENT
Start: 2024-05-02 | End: 2024-05-02

## 2024-05-02 RX ORDER — METHOCARBAMOL 750 MG/1
750 TABLET, FILM COATED ORAL 4 TIMES DAILY
Status: DISCONTINUED | OUTPATIENT
Start: 2024-05-02 | End: 2024-05-03 | Stop reason: HOSPADM

## 2024-05-02 RX ORDER — OXYCODONE HYDROCHLORIDE 5 MG/1
5 TABLET ORAL EVERY 4 HOURS PRN
Status: DISCONTINUED | OUTPATIENT
Start: 2024-05-02 | End: 2024-05-03 | Stop reason: HOSPADM

## 2024-05-02 RX ADMIN — PHENYLEPHRINE HYDROCHLORIDE 100 MCG: 10 INJECTION INTRAVENOUS at 11:05

## 2024-05-02 RX ADMIN — LIDOCAINE HYDROCHLORIDE 100 MG: 20 INJECTION INTRAVENOUS at 11:05

## 2024-05-02 RX ADMIN — ACETAMINOPHEN 650 MG: 325 TABLET ORAL at 11:05

## 2024-05-02 RX ADMIN — MIDAZOLAM HYDROCHLORIDE 2 MG: 2 INJECTION, SOLUTION INTRAMUSCULAR; INTRAVENOUS at 11:05

## 2024-05-02 RX ADMIN — HALOPERIDOL LACTATE 0.5 MG: 5 INJECTION, SOLUTION INTRAMUSCULAR at 02:05

## 2024-05-02 RX ADMIN — FAMOTIDINE 20 MG: 20 TABLET, FILM COATED ORAL at 08:05

## 2024-05-02 RX ADMIN — SODIUM CHLORIDE: 0.9 INJECTION, SOLUTION INTRAVENOUS at 09:05

## 2024-05-02 RX ADMIN — PROPOFOL 175 MCG/KG/MIN: 10 INJECTION, EMULSION INTRAVENOUS at 11:05

## 2024-05-02 RX ADMIN — Medication 20 MG: at 11:05

## 2024-05-02 RX ADMIN — CELECOXIB 400 MG: 200 CAPSULE ORAL at 09:05

## 2024-05-02 RX ADMIN — OXYCODONE 5 MG: 5 TABLET ORAL at 01:05

## 2024-05-02 RX ADMIN — SODIUM CHLORIDE 0.1 MCG/KG/MIN: 9 INJECTION, SOLUTION INTRAVENOUS at 11:05

## 2024-05-02 RX ADMIN — DOCUSATE SODIUM 100 MG: 100 CAPSULE, LIQUID FILLED ORAL at 08:05

## 2024-05-02 RX ADMIN — METHOCARBAMOL 750 MG: 750 TABLET ORAL at 08:05

## 2024-05-02 RX ADMIN — METHOCARBAMOL 750 MG: 750 TABLET ORAL at 01:05

## 2024-05-02 RX ADMIN — METHOCARBAMOL 1000 MG: 500 TABLET ORAL at 09:05

## 2024-05-02 RX ADMIN — FENTANYL CITRATE 100 MCG: 50 INJECTION, SOLUTION INTRAMUSCULAR; INTRAVENOUS at 11:05

## 2024-05-02 RX ADMIN — FAMOTIDINE 20 MG: 10 INJECTION, SOLUTION INTRAVENOUS at 11:05

## 2024-05-02 RX ADMIN — DEXAMETHASONE SODIUM PHOSPHATE 8 MG: 4 INJECTION, SOLUTION INTRAMUSCULAR; INTRAVENOUS at 11:05

## 2024-05-02 RX ADMIN — CARBOXYMETHYLCELLULOSE SODIUM 4 DROP: 10 GEL OPHTHALMIC at 11:05

## 2024-05-02 RX ADMIN — SUCCINYLCHOLINE CHLORIDE 100 MG: 20 INJECTION, SOLUTION INTRAMUSCULAR; INTRAVENOUS at 11:05

## 2024-05-02 RX ADMIN — ACETAMINOPHEN 650 MG: 325 TABLET ORAL at 05:05

## 2024-05-02 RX ADMIN — EPHEDRINE SULFATE 5 MG: 50 INJECTION INTRAVENOUS at 12:05

## 2024-05-02 RX ADMIN — HYDROMORPHONE HYDROCHLORIDE 0.2 MG: 1 INJECTION, SOLUTION INTRAMUSCULAR; INTRAVENOUS; SUBCUTANEOUS at 01:05

## 2024-05-02 RX ADMIN — MELATONIN TAB 3 MG 6 MG: 3 TAB at 11:05

## 2024-05-02 RX ADMIN — CEFAZOLIN 2 G: 2 INJECTION, POWDER, FOR SOLUTION INTRAMUSCULAR; INTRAVENOUS at 05:05

## 2024-05-02 RX ADMIN — METHOCARBAMOL 750 MG: 750 TABLET ORAL at 05:05

## 2024-05-02 RX ADMIN — PROPOFOL 150 MG: 10 INJECTION, EMULSION INTRAVENOUS at 11:05

## 2024-05-02 RX ADMIN — SODIUM CHLORIDE: 0.9 INJECTION, SOLUTION INTRAVENOUS at 06:05

## 2024-05-02 RX ADMIN — ONDANSETRON 4 MG: 2 INJECTION INTRAMUSCULAR; INTRAVENOUS at 01:05

## 2024-05-02 RX ADMIN — SODIUM CHLORIDE, SODIUM GLUCONATE, SODIUM ACETATE, POTASSIUM CHLORIDE, MAGNESIUM CHLORIDE, SODIUM PHOSPHATE, DIBASIC, AND POTASSIUM PHOSPHATE: .53; .5; .37; .037; .03; .012; .00082 INJECTION, SOLUTION INTRAVENOUS at 12:05

## 2024-05-02 RX ADMIN — ACETAMINOPHEN 1000 MG: 500 TABLET ORAL at 09:05

## 2024-05-02 RX ADMIN — MUPIROCIN: 20 OINTMENT TOPICAL at 08:05

## 2024-05-02 RX ADMIN — PROCHLORPERAZINE EDISYLATE 5 MG: 5 INJECTION INTRAMUSCULAR; INTRAVENOUS at 02:05

## 2024-05-02 NOTE — ASSESSMENT & PLAN NOTE
Heraclio Lam is a 53 y.o. male is s/p C3-4 ACDF on 5/2    Surgical dressing C/D/I  Pain control: multimodal  PT/OT: WBAT BLE, spine precautions  DVT PPx: none ppx, FCDs at all times when not ambulating  Abx: postop Ancef x 24hrs   Drain: x1   Bosch: Remove POD 1   Nursing: Incentive spirometry, Monitor and record drain output each shift     Dispo: plan to dc home today    Output by Drain (mL) 05/01/24 0701 - 05/01/24 1900 05/01/24 1901 - 05/02/24 0700 05/02/24 0701 - 05/02/24 1900 05/02/24 1901 - 05/03/24 0700 05/03/24 0701 - 05/03/24 0721        Closed/Suction Drain 05/02/24 Tube - 1 Anterior Neck Bulb   35 25

## 2024-05-02 NOTE — PLAN OF CARE
Problem: Physical Therapy  Goal: Physical Therapy Goal  Description: Goals to be met by: 5/3/24     Patient will increase functional independence with mobility by performin. Supine to sit with Supervision  2. Sit to stand transfer with Supervision  3. Gait  x 300 feet with Supervision using No Assistive Device.   4. Ascend/Descend 6 inch curb step with Stand-by Assistance using No Assistive Device  5. Pt to verbalize 3/3 recall of spinal precautions and demonstrate good understanding with his mobiltiy.    Outcome: Progressing

## 2024-05-02 NOTE — NURSING TRANSFER
Nursing Transfer Note      5/2/2024   4:00 PM    Nurse giving handoff: maryana Galicia  Nurse receiving handoff:maryana Rachel    Reason patient is being transferred: Admit    Transfer To: [id number]    Transfer via bed    Transfer with belongings    Transported by rn, pct    Medicines sent: n/a    Any special needs or follow-up needed: drain care    Patient belongings transferred with patient: Yes    Chart send with patient: Yes    Notified: mom    Patient reassessed at: 5/2/24.1440     Upon arrival to floor: patient oriented to room, call bell in reach, and bed in lowest position

## 2024-05-02 NOTE — PLAN OF CARE
Need admit orders, type and screen, Sx and blood consent, site marked, updated h&p.    Mother at bedside. Belongings to be locked in a locker.    Bed in lowest, locked position side rails up X2. Call light within reach. Questions answered. No apparent distress noted. Ongoing monitoring.

## 2024-05-02 NOTE — ANESTHESIA POSTPROCEDURE EVALUATION
Anesthesia Post Evaluation    Patient: Heraclio Lam    Procedure(s) Performed: Procedure(s) (LRB):  DISCECTOMY, SPINE, CERVICAL, ANTERIOR APPROACH, WITH FUSION C3-4 SPINEWAVE SNS: VOCAL CORDS/MOTORS/SSEP (N/A)    Final Anesthesia Type: general      Patient location during evaluation: PACU  Patient participation: Yes- Able to Participate  Level of consciousness: awake and alert  Post-procedure vital signs: reviewed and stable  Pain management: adequate  Airway patency: patent  ARACELY mitigation strategies: Multimodal analgesia  PONV status at discharge: No PONV  Anesthetic complications: no      Cardiovascular status: blood pressure returned to baseline  Respiratory status: unassisted  Hydration status: euvolemic  Follow-up not needed.              Vitals Value Taken Time   /67 05/02/24 1607   Temp 36.6 °C (97.8 °F) 05/02/24 1607   Pulse 69 05/02/24 1607   Resp 18 05/02/24 1607   SpO2 99 % 05/02/24 1607         Event Time   Out of Recovery 14:10:00         Pain/Dee Score: Pain Rating Prior to Med Admin: 7 (5/2/2024  5:21 PM)  Dee Score: 4 (5/2/2024  1:23 PM)

## 2024-05-02 NOTE — H&P
DATE: 5/2/2024  PATIENT: Heraclio Lam    Attending Physician: Cosme Chaidez M.D.    CHIEF COMPLAINT: neck and b/l shoulder pain    HISTORY:  Heraclio Lam is a 53 y.o. male w/ a hx of shunt for syrinx in 1999 and C6-7 ACDF in 2006 presents for initial evaluation of neck and b/l shoulder pain (Neck - 6, Shoulder - 6). The pain has been present for years. The patient describes the pain as dull and it radiates to both shoulders. The pain is worse with activity and improved by rest. There is BUE associated numbness and tingling. There is BUE subjective weakness. Prior treatments have included meds (ibuprofen and gabapentin), PT, and ESIs, but no recent surgery.     He also complained of years history of LBP that radiates to BLE. There is associated BLE n/t and weakness. He cannot walk far due to pain; he gets relief by leaning forward on a shopping cart.    The Patient endorses myelopathic symptoms such as handwriting changes or difficulty with buttons/coins/keys. He has trouble with walking/balance. Denies perineal paresthesias, bowel/bladder dysfunction.    The patient does not smoke, have DM or endorse IVDU. The patient is not on any blood thinners and does not take chronic narcotics. He is a retired .    PAST MEDICAL/SURGICAL HISTORY:  Past Medical History:   Diagnosis Date    Arthritis      Past Surgical History:   Procedure Laterality Date    COLONOSCOPY N/A 11/29/2022    Procedure: COLONOSCOPY;  Surgeon: Cody Valle MD;  Location: [facility] ENDO [address]);  Service: Endoscopy;  Laterality: N/A;  instructions via portal and mail -   11/22 pre call left message -     CSF SHUNT      EPIDURAL STEROID INJECTION N/A 12/4/2018    Procedure: Injection, Steroid, Epidural CERVICAL T1-2 INTERLAMINAR BATSHEVA;  Surgeon: Elba Juarez MD;  Location: [facility] PAIN T;  Service: Pain Management;  Laterality: N/A;    EPIDURAL STEROID INJECTION N/A 1/8/2019    Procedure: Injection, Steroid,  Epidural CERVICAL T1-2 IL BATSHEVA;  Surgeon: Elba Juarez MD;  Location: [facility] PAIN MGT;  Service: Pain Management;  Laterality: N/A;    EPIDURAL STEROID INJECTION N/A 8/20/2020    Procedure: INJECTION, STEROID, EPIDURAL T1-2 IL BATSHEVA;  Surgeon: Elba Juarez MD;  Location: [facility] PAIN MGT;  Service: Pain Management;  Laterality: N/A;  T1-2 IL BATSHEVA   consent needed    EPIDURAL STEROID INJECTION INTO LUMBAR SPINE Bilateral 3/14/2024    Procedure: B/L L4-5 TFESI;  Surgeon: Karson Longoria MD;  Location: [facility] PAIN MANAGEMENT;  Service: Pain Management;  Laterality: Bilateral;  20 mins    JOINT REPLACEMENT  2006    disc Sx , cervical    SPINE SURGERY         Current Medications:   Current Facility-Administered Medications:     ceFAZolin 2 g in dextrose 5 % in water (D5W) 50 mL IVPB (MB+), 2 g, Intravenous, On Call Procedure, Cosme Chaidez MD    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   Tobacco Use    Smoking status: Never    Smokeless tobacco: Never   Substance and Sexual Activity    Alcohol use: Not Currently    Drug use: No     Social Determinants of Health     Financial Resource Strain: Low Risk  (3/3/2024)    Overall Financial Resource Strain (CARDIA)     Difficulty of Paying Living Expenses: Not hard at all   Food Insecurity: No Food Insecurity (3/3/2024)    Hunger Vital Sign     Worried About Running Out of Food in the Last Year: Never true     Ran Out of Food in the Last Year: Never true   Transportation Needs: No Transportation Needs (3/3/2024)    PRAPARE - Transportation     Lack of Transportation (Medical): No     Lack of Transportation (Non-Medical): No   Physical Activity: Insufficiently Active (3/3/2024)    Exercise Vital Sign     Days of Exercise per Week: 7 days     Minutes of Exercise per Session: 20 min   Stress: Stress Concern Present (3/3/2024)    [employer] -  "Occupational Stress Questionnaire     Feeling of Stress : Very much   Housing Stability: Low Risk  (3/3/2024)    Housing Stability Vital Sign     Unable to Pay for Housing in the Last Year: No     Number of Places Lived in the Last Year: 1     Unstable Housing in the Last Year: No       REVIEW OF SYSTEMS:  Constitution: Negative. Negative for chills, fever and night sweats.   Cardiovascular: Negative for chest pain and syncope.   Respiratory: Negative for cough and shortness of breath.   Gastrointestinal: See HPI. Negative for nausea/vomiting. Negative for abdominal pain.  Genitourinary: See HPI. Negative for discoloration or dysuria.  Skin: Negative for dry skin, itching and rash.   Hematologic/Lymphatic: negative for bleeding/clotting disorders.   Musculoskeletal: Negative for falls and muscle weakness.   Neurological: See HPI. no history of seizures. no history of cranial surgery or shunts.  Endocrine: Negative for polydipsia, polyphagia and polyuria.   Allergic/Immunologic: Negative for hives and persistent infections.  Psychiatric/Behavioral: Negative for depression and insomnia.         EXAM:  BP (!) 160/89 (BP Location: Left arm, Patient Position: Sitting)   Pulse 71   Temp 98.4 °F (36.9 °C) (Temporal)   Resp 14   Ht 5' 9" (1.753 m)   Wt 91.2 kg (201 lb)   SpO2 100%   BMI 29.68 kg/m²     General: The patient is a 53 y.o. male in no apparent distress, the patient is orientatied to person, place and time.  Psych: Normal mood and affect  HEENT: Vision grossly intact, hearing intact to the spoken word.  Lungs: Respirations unlabored.  Gait: Normal station and gait, no difficulty with toe or heel walk.   Skin: Cervical skin negative for rashes, lesions, hairy patches. Anterior and posterior cervical scars.  Range of motion: Cervical range of motion is acceptable. There is posterior cervical and lower lumbar tenderness to palpation.  Spinal Balance: Global saggital and coronal spinal balance acceptable, no " significant for scoliosis and kyphosis.  Musculoskeletal: No pain with the range of motion of the bilateral shoulders and elbows. Normal bulk and contour of the bilateral hands.  Vascular: Bilateral hands warm and well perfused, Radial pulses 2+ bilaterally.  Neurological: Normal strength and tone in all major motor groups in the bilateral upper and lower extremities. Normal sensation to light touch in the C5-T1 and L2-S1 dermatomes bilaterally.  Deep tendon reflexes symmetric 2+ in the bilateral upper and lower extremities.  Negative Inverted Radial Reflex and Lopez's bilaterally. Negative Babinski bilaterally.     IMAGING:   Today I independently reviewed the following images and my interpretations are as follows:    AP, Lat and Flex/Ex  upright C-spine films demonstrate C6-7 ACDF. Spondylosis and DDD.    MRI cervical showed syrinx from C5-T1. C3-4 mod-severe central and b/l foraminal stenosis.    MRI thoracic showed T8-9 mod central stenosis.    MRI lumbar showed mod-severe L3-5 central stenosis and L4-5 b/l foraminal stenosis.    CT cervical showed spondylosis. C6-7 laminectomy.     Body mass index is 29.68 kg/m².  Hemoglobin A1C   Date Value Ref Range Status   04/10/2024 5.6 4.0 - 5.6 % Final     Comment:     ADA Screening Guidelines:  5.7-6.4%  Consistent with prediabetes  >or=6.5%  Consistent with diabetes    High levels of fetal hemoglobin interfere with the HbA1C  assay. Heterozygous hemoglobin variants (HbS, HgC, etc)do  not significantly interfere with this assay.   However, presence of multiple variants may affect accuracy.     03/06/2023 5.1 4.0 - 5.6 % Final     Comment:     ADA Screening Guidelines:  5.7-6.4%  Consistent with prediabetes  >or=6.5%  Consistent with diabetes    High levels of fetal hemoglobin interfere with the HbA1C  assay. Heterozygous hemoglobin variants (HbS, HgC, etc)do  not significantly interfere with this assay.   However, presence of multiple variants may affect accuracy.      10/05/2022 5.3 4.0 - 5.6 % Final     Comment:     ADA Screening Guidelines:  5.7-6.4%  Consistent with prediabetes  >or=6.5%  Consistent with diabetes    High levels of fetal hemoglobin interfere with the HbA1C  assay. Heterozygous hemoglobin variants (HbS, HgC, etc)do  not significantly interfere with this assay.   However, presence of multiple variants may affect accuracy.         ASSESSMENT/PLAN:  Cervical spondylosis with radiculopathy  -     Place in Outpatient; Standing  -     Diet NPO; Standing  -     Full code; Standing  -     Place sequential compression device; Standing    Cervical myelopathy    Other orders  -     SURG FL Surgery Fluoro Usage; Standing  -     methocarbamoL tablet 1,000 mg  -     celecoxib capsule 400 mg  -     acetaminophen tablet 1,000 mg  -     Admit to Phase I recovery, transfer to phase II level of care when Dee score is 9 out of 10; Standing  -     Vital signs; Standing  -     Intake and output Per protocol; Standing  -     Apply warming blanket; Standing  -     Discharge home from Phase II when PADSS scoring system score is 9 to 10 on PADSS Scoring system met; Standing  -     POCT glucose; Standing  -     Notify Anesthesiologist; Standing  -     Notify anesthesiologist after 2 hours if Phase II level of care criteria not met; Standing  -     Notify Physician - Potential Need of Opioid Reversal; Standing  -     Oxygen Continuous; Standing  -     Pulse Oximetry Continuous; Standing  -     HYDROmorphone injection 0.2 mg  -     haloperidol lactate injection 0.5 mg  -     prochlorperazine injection Soln 5 mg  -     IP VTE HIGH RISK PATIENT; Standing  -     ceFAZolin 2 g in dextrose 5 % in water (D5W) 50 mL IVPB (MB+)  -     Anesthesia Peripheral Block; Standing      No follow-ups on file.    Patient has cervical spondylosis and stenosis with myeloradiculopathy. Will proceed with C3-4 ACDF.    Cosme Chaidez MD  Orthopaedic Spine Surgeon  Department of Orthopaedic  Surgery  [phone]

## 2024-05-02 NOTE — ANESTHESIA PREPROCEDURE EVALUATION
05/02/2024  Heraclio Lam is a 53 y.o., male.      Pre-op Assessment    I have reviewed the Patient Summary Reports.     I have reviewed the Nursing Notes. I have reviewed the NPO Status.   I have reviewed the Medications.     Review of Systems  Anesthesia Hx:  No problems with previous Anesthesia   History of prior surgery of interest to airway management or planning:            Denies Personal Hx of Anesthesia complications.                    Social:  Non-Smoker       Hematology/Oncology:  Hematology Normal   Oncology Normal                                   EENT/Dental:  EENT/Dental Normal           Cardiovascular:  Exercise tolerance: good          Denies Angina.       Denies MCELROY.                            Pulmonary:  Pulmonary Normal                       Renal/:  Renal/ Normal                 Hepatic/GI:      Denies GERD.             Musculoskeletal:  Arthritis               Neurological:    Neuromuscular Disease,       H/o  shunt                            Endocrine:  Endocrine Normal            Psych:  Psychiatric Normal                  Physical Exam  General: Well nourished, Cooperative, Alert and Oriented    Airway:  Mallampati: II   Mouth Opening: Normal  TM Distance: Normal  Tongue: Normal  Neck ROM: Normal ROM    Dental:  Intact    Chest/Lungs:  Normal Respiratory Rate    Heart:  Rate: Normal    Anesthesia Plan  Type of Anesthesia, risks & benefits discussed:    Anesthesia Type: Gen ETT  Intra-op Monitoring Plan: Standard ASA Monitors  Post Op Pain Control Plan: multimodal analgesia and IV/PO Opioids PRN  Induction:  IV  Airway Plan: Video, Post-Induction with in-line stabilization  Informed Consent: Informed consent signed with the Patient and all parties understand the risks and agree with anesthesia plan.  All questions answered.   ASA Score: 2  Day of Surgery Review of  History & Physical: H&P Update referred to the surgeon/provider.    Ready For Surgery From Anesthesia Perspective.   .

## 2024-05-02 NOTE — PLAN OF CARE
Problem: Adult Inpatient Plan of Care  Goal: Plan of Care Review  Outcome: Progressing  Goal: Patient-Specific Goal (Individualized)  Outcome: Progressing  Goal: Absence of Hospital-Acquired Illness or Injury  Outcome: Progressing  Goal: Optimal Comfort and Wellbeing  Outcome: Progressing  Goal: Readiness for Transition of Care  Outcome: Progressing     Problem: Wound  Goal: Optimal Coping  Outcome: Progressing  Goal: Optimal Functional Ability  Outcome: Progressing  Goal: Absence of Infection Signs and Symptoms  Outcome: Progressing  Goal: Improved Oral Intake  Outcome: Progressing  Goal: Optimal Pain Control and Function  Outcome: Progressing  Goal: Skin Health and Integrity  Outcome: Progressing  Goal: Optimal Wound Healing  Outcome: Progressing    Received pt from pacu earlier, mother at bedside , dsg to anterior neck C/D/I , SHELIA drain with serosanginous drainage , pt tolerated meds and meals , pt voided clear yellow urine in urinal , IV site wnl , vss no distress,. Ivfs infusing without difficulty.

## 2024-05-02 NOTE — ANESTHESIA PROCEDURE NOTES
Intubation    Date/Time: 5/2/2024 11:26 AM    Performed by: Frida Gallo CRNA  Authorized by: Gabino Bowman MD    Intubation:     Induction:  Intravenous    Intubated:  Postinduction    Mask Ventilation:  Easy mask    Attempts:  1    Attempted By:  CRNA    Method of Intubation:  Video laryngoscopy    Blade:  Chowdhury 3    Laryngeal View Grade: Grade I - full view of cords      Difficult Airway Encountered?: No      Complications:  None    Airway Device:  EMG ETT (NIMS)    Airway Device Size:  7.0    Style/Cuff Inflation:  Cuffed (inflated to minimal occlusive pressure)    Inflation Amount (mL):  6    Secured at:  The lips    Placement Verified By:  Capnometry    Complicating Factors:  None    Findings Post-Intubation:  BS equal bilateral and atraumatic/condition of teeth unchanged

## 2024-05-02 NOTE — PT/OT/SLP EVAL
Physical Therapy Evaluation and Treatment    Patient Name: Heraclio Lam   MRN: [id number]  Recent Surgery: Procedure(s) (LRB):  DISCECTOMY, SPINE, CERVICAL, ANTERIOR APPROACH, WITH FUSION C3-4 SPINEWAVE SNS: VOCAL CORDS/MOTORS/SSEP (N/A) Day of Surgery    Recommendations:     Discharge Recommendations: No Therapy Indicated   Discharge Equipment Recommendations: none   Barriers to discharge: None    Assessment:     Heraclio Lam is a 53 y.o. male admitted with a medical diagnosis of cervical myelopathy He presents with the following impairments/functional limitations: impaired endurance, impaired functional mobility, impaired self care skills, gait instability, impaired coordination, pain, orthopedic precautions. Pt presents s/p ACDF C3-4 with expected post op deficits.  He did well with PT today and was able to amb 160' with no AD and CGA on level surfaces with mild unsteadiness noted during gait.  Pt ed on spinal precautions and required assist to recall 3/3 precautions at end of session.  He should progress well with PT tomorrow and be able to d/c home with his mom to assist if needed.     Rehab Prognosis: Good; patient would benefit from acute PT services to address these deficits and reach maximum level of function.    Plan:     During this hospitalization, patient to be seen daily to address the above listed problems via gait training, therapeutic activities, therapeutic exercises    Plan of Care Expires: 06/01/24    Subjective     Chief Complaint: Pt reports feeling a little nauseated at end of session.   Patient Comments/Goals: to go home  Pain/Comfort:  Pain Rating 1: 7/10  Location 1: neck  Pain Addressed 1: Pre-medicate for activity, Distraction, Reposition  Pain Rating Post-Intervention 1: 7/10    Social History:  Living Environment: Patient lives with their mom and dad  in a 2 story home with number of outside stair(s): entry step only, number of inside stair(s): full flight, bed/bath on  1st  floor, and can live on 1st floor- pt stays on 1st floor.  Pt's mom is caregiver for his dad due to his dad has Ca.   Prior Level of Function: Prior to admission, patient was independent  Equipment Used at Home: none  DME owned (not currently used): none  Assistance Upon Discharge:  his mom    Objective:     Communicated with RN and OT prior to session. Pt was too nauseated to participate with OT,   PT waited 40 min later to see pt and he was feeling better. Patient found up in chair with peripheral IV, SCD, SHELIA drain, cervical collar upon PT entry to room.    General Precautions: Standard, fall   Orthopedic Precautions: spinal precautions   Braces: Cervical collar    Respiratory Status: Room air    Exams:  RLE ROM: WNL  RLE Strength: WNL  LLE ROM: WNL  LLE Strength: WNL  Cognitive: Patient is oriented to Person, Place, Time, Situation  Fine Motor Coordination:    -       Impaired  mild impairment noted with rapid alternating DF/PF,, RLE heel shin mild impairment, and LLE heel shin mild impairment  Gross Motor Coordination: WFL  Postural Exam: Patient presented with the following abnormalities:    -       No postural abnormalities identified  Sensation:    -       Intact    Functional Mobility:  Gait belt applied - Yes  Bed Mobility  Seated in chair at start of session and returned to chair  Transfers  Sit to Stand: stand by assistance with no AD and with cues for hand placement  Toilet Transfer: contact guard assistance with no AD and with cues for safety awareness using urinal while standing at the commode  Gait  Patient ambulated 160' with no AD and contact guard assistance for balance.  Pt with occasional unsteadiness and noted increase lateral wt shifting during gait, decrease vinicio and increase ELEONORA.   . Patient required cues for heel strike and upright posture to increase independence and safety. Patient required cues ~ 25% of the time.  Balance  Sitting: GOOD: Maintains balance through MODERATE  excursions of active trunk movement  Standing: FAIR: Needs CONTACT GUARD during gait    Therapeutic Activities and Exercises:  Patient educated on role of acute care PT and PT POC, safety while in hospital including calling nurse for mobility, and call light usage.  Educated about importance of OOB mobility and remaining up in chair most of the day.  PT ed pt on spinal precautions,( including avoidance of bending, lifting, and twisting), log rolling, posture and body mechanics during mobility and pt verbalized good understanding back to PT.  Pt with fair recall of spinal precautions.   Pt performed standing activities at the sink for personal hygiene with SBA    AM-PAC 6 CLICK MOBILITY  Total Score:18    Patient left up in chair with all lines intact, call button in reach, and RN notified.    GOALS:   Multidisciplinary Problems       Physical Therapy Goals          Problem: Physical Therapy    Goal Priority Disciplines Outcome Goal Variances Interventions   Physical Therapy Goal     PT, PT/OT Progressing     Description: Goals to be met by: 5/3/24     Patient will increase functional independence with mobility by performin. Supine to sit with Supervision  2. Sit to stand transfer with Supervision  3. Gait  x 300 feet with Supervision using No Assistive Device.   4. Ascend/Descend 6 inch curb step with Stand-by Assistance using No Assistive Device  5. Pt to verbalize 3/3 recall of spinal precautions and demonstrate good understanding with his mobiltiy.                         History:     Past Medical History:   Diagnosis Date    Arthritis        Past Surgical History:   Procedure Laterality Date    COLONOSCOPY N/A 2022    Procedure: COLONOSCOPY;  Surgeon: Cody Valle MD;  Location: [address];  Service: Endoscopy;  Laterality: N/A;  instructions via portal and mail -    pre call left message - [facility]    CSF SHUNT      EPIDURAL STEROID INJECTION N/A 2018    Procedure: Injection,  Steroid, Epidural CERVICAL T1-2 INTERLAMINAR BATSHEVA;  Surgeon: Elba Juarez MD;  Location: [facility];  Service: Pain Management;  Laterality: N/A;    EPIDURAL STEROID INJECTION N/A 1/8/2019    Procedure: Injection, Steroid, Epidural CERVICAL T1-2 IL BATSHEVA;  Surgeon: Elba Juarez MD;  Location: [facility];  Service: Pain Management;  Laterality: N/A;    EPIDURAL STEROID INJECTION N/A 8/20/2020    Procedure: INJECTION, STEROID, EPIDURAL T1-2 IL BATSHEVA;  Surgeon: Elba Juarez MD;  Location: [facility];  Service: Pain Management;  Laterality: N/A;  T1-2 IL BATSHEVA   consent needed    EPIDURAL STEROID INJECTION INTO LUMBAR SPINE Bilateral 3/14/2024    Procedure: B/L L4-5 TFESI;  Surgeon: Karson Longoria MD;  Location: [facility];  Service: Pain Management;  Laterality: Bilateral;  20 mins    JOINT REPLACEMENT  2006    disc Sx , cervical    SPINE SURGERY         Time Tracking:     PT Received On: 05/02/24  PT Start Time: 1620  PT Stop Time: 1635  PT Total Time (min): 15 min     Billable Minutes: Evaluation 7 and Therapeutic Activity 8    5/2/2024

## 2024-05-02 NOTE — HOSPITAL COURSE
On 5/2, the patient arrived to the [facility] for proper pre-operative management.  Upon completion of pre-operative preparation, the patient was taken back to the operative theatre. C3-4 ACDF was performed without complication and the patient was transported to the post anesthesia care unit in stable condition.  After appropriate recovery from the anaesthetic agents used during the surgery, the patient was then transported to the hospital inpatient floor.  The interim of the hospital stay from arrival on the floor up to discharge has been uncomplicated. The patient has tolerated regular diet.  The patient's pain has been controlled using a multimodal approach. Currently, the patient's pain is well controlled on an oral regimen.  The patient has been voiding without difficulty.  The patient began participation in physical therapy after surgery and has progressed throughout the entire hospital stay.  Currently, the patient's progress is sufficient to allow the them to be discharged to home safely.  The patient agrees with this assessment and desires a discharge today.

## 2024-05-02 NOTE — OP NOTE
DATE OF PROCEDURE: 5/2/2024.     SURGEON: Cosme Chaidez M.D.     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 Anterior cervical diskectomy and instrumented spinal fusion, including decompression of neurological elements, C3-4.  2. Application of carbon fiber reinforced polyetheretherketone titanium intervertebral spacer to C3-4 disk space.  3. Anterior instrumentation, C3-4.  4. Cadaveric and local bone grafting.     ANESTHESIA: General endotracheal anesthesia.     ESTIMATED BLOOD LOSS: 10 mL.     IMPLANTS: SpineWave  C3-4: 7mm height PEEK cage  2 level plate  16-18mm screws    SPECIMENS: None.     FINDINGS: severe C3-4 stenosis.     DRAINS: One deep.     COMPLICATIONS: None.     SPONGE AND NEEDLE COUNT: Correct x2.     NEUROLOGICAL MONITORING: Motor evoked potentials, somatosensory evoked potential, free-running EMG, and vocal fold monitoring.  There were no changes to preincisional MEP and SSEP baselines.  There was no significant EMG activity.     REASON FOR OPERATION AND BRIEF HISTORY AND PHYSICAL: Heraclio Lam is a 53 y.o. male with cervical stenosis C3-4 with myeloradiculopathy. They have failed conservative management and are here for surgery today.      DESCRIPTION OF INFORMED CONSENT: Please see my last clinic note for a full description of the informed consent process that I had with the patient.     DESCRIPTION OF PROCEDURE: The patient was met in the preoperative area where their right-sided neck was marked as the operative site. Subsequently, they were brought to the Operating Room where they were placed under general endotracheal anesthesia. Sequential compression devices were placed in the patient's bilateral calves and run throughout the case. A Bosch catheter was not placed. At this point, a shoulder roll was placed and  the patient's neck was gently hyperextended. The neck was stacked in multiple stack sheets as well as a gel roll. At this point, the patient's anterior cervical spine was prepped and draped in a normal sterile fashion.     A full timeout was then called, identifying the patient, the procedural site and levels, the availability of all instruments and implants, and no specific nursing, surgical, anesthetic, or neurological monitoring concerns.  All present were empowered to identify any concerns at any time. Finally, it was safe to proceed with surgery and the patient was given weight-appropriate dose of Ancef by the Anesthesia Service as well as 8 mg of Decadron.     I then made a transverse incision centered over the patient's anterior cervical spine and carried dissection down through skin and subcutaneous tissues using a combination of sharp dissection and electrocautery where necessary. The platysma was identified and transected in line with the skin incision and subplatysmal flaps were elevated. At this point, I performed a standard Mayer Olivares approach tothe anterior cervical spine. Any large bridging veins or arteries were tied and ligated. There was significant scar tissue from previous surgery; care was taken to avoid excessive bleeding. Small veins were coagulated with bipolar electrocautery. At this point, I visualized the anterior cervical spine. Peanut dissection allowed me to visualize the vertebral body. I placed a needle in what I took to be the C3-4 disc and took a lateral radiograph and thus confirmed my level. I then finalized my exposure. At the conclusion of my exposure, I could see from the inferior half of the C3 vertebrae to the superior half of the C4 vertebra and the disk space from uncinate process to uncinate process.     I then performed a subtotal C3-4 diskectomy under loupe magnification using a disk knife as well as straight and angled curettes, Kerrison punches, and pituitary  rongeurs.     Under microscopic visualization, I drilled down the posterior aspect of the disk space with a high-speed drill to reveal the posterior longitudinal ligament. I used a 4-0 forward-angle curette to enter the median raphe of the ligament, followed by #1 and #2 Kerrison punches to resect the posterior longitudinal ligament. At the end of my neurological decompression, I could see dura from uncinate process to uncinate process. Epidural hemostasis was finalized with patties and FloSeal. The wound was then thoroughly irrigated. The disk space was sized for a size 7mm height spacer and this was filled with 1 mL of DBX putty as well as locally harvested bone and gently impacted into place. An anterior plate was then applied in the standard fashion, spanning C3-4.     AP and lateral radiographs were taken, demonstrating correct level as well as adequate positionof all implants. The wound was then thoroughly irrigated. The plate screw capture mechanism was then locked with the torque wrench. A deep 10-Liberian SHELIA drain was placed. The platysma was closed with 3-0 Vicryl, followed by 4-0 Monocryl for the skin. A 2-0 silk suture was used to secure down the drain. A soft dressing was placed. The patient was subsequently transferred to his hospital bed, awoken, and transferred to the Recovery Room in stable condition.    Justification of -22 Modifier: This was a more complex case that usual given the patient's revision nature and scar tissue. This made all aspects of the surgery more difficult, from exposure to discectomy and instrumentation.    Cosme Chaidez MD  Orthopaedic Spine Surgeon  Department of Orthopaedic Surgery  [phone]

## 2024-05-02 NOTE — HPI
CHIEF COMPLAINT: neck and b/l shoulder pain     HISTORY:  Heraclio Lam is a 53 y.o. male w/ a hx of shunt for syrinx in 1999 and C6-7 ACDF in 2006 presents for initial evaluation of neck and b/l shoulder pain (Neck - 6, Shoulder - 6). The pain has been present for years. The patient describes the pain as dull and it radiates to both shoulders. The pain is worse with activity and improved by rest. There is BUE associated numbness and tingling. There is BUE subjective weakness. Prior treatments have included meds (ibuprofen and gabapentin), PT, and ESIs, but no recent surgery.      He also complained of years history of LBP that radiates to BLE. There is associated BLE n/t and weakness. He cannot walk far due to pain; he gets relief by leaning forward on a shopping cart.     The Patient endorses myelopathic symptoms such as handwriting changes or difficulty with buttons/coins/keys. He has trouble with walking/balance. Denies perineal paresthesias, bowel/bladder dysfunction.     The patient does not smoke, have DM or endorse IVDU. The patient is not on any blood thinners and does not take chronic narcotics. He is a retired .

## 2024-05-02 NOTE — TRANSFER OF CARE
"Anesthesia Transfer of Care Note    Patient: Heraclio Lam    Procedure(s) Performed: Procedure(s) (LRB):  DISCECTOMY, SPINE, CERVICAL, ANTERIOR APPROACH, WITH FUSION C3-4 SPINEWAVE SNS: VOCAL CORDS/MOTORS/SSEP (N/A)    Patient location: PACU    Anesthesia Type: general    Transport from OR: Transported from OR on 6-10 L/min O2 by face mask with adequate spontaneous ventilation    Post pain: adequate analgesia    Post assessment: no apparent anesthetic complications and tolerated procedure well    Post vital signs: stable    Level of consciousness: awake, alert and oriented    Nausea/Vomiting: no nausea/vomiting    Complications: none    Transfer of care protocol was followed      Last vitals: Visit Vitals  BP (!) 160/89 (BP Location: Left arm, Patient Position: Sitting)   Pulse 74   Temp 36.9 °C (98.4 °F) (Temporal)   Resp 14   Ht 5' 9" (1.753 m)   Wt 91.2 kg (201 lb)   SpO2 100%   BMI 29.68 kg/m²     "

## 2024-05-02 NOTE — SUBJECTIVE & OBJECTIVE
"Principal Problem:<principal problem not specified>    Principal Orthopedic Problem: s/p C3-4 ACDF    Interval History: Patient seen and examined at bedside. NAEON. Patient doing well. No complaints. Pain well controlled. Patient mobilized yesterday with PT, walked 160ft yesterday.    Review of patient's allergies indicates:  No Known Allergies    Current Facility-Administered Medications   Medication Dose Route Frequency Provider Last Rate Last Admin    acetaminophen tablet 650 mg  650 mg Oral Q6H ROZ Ronquillo MD        ceFAZolin 2 g in dextrose 5 % in water (D5W) 50 mL IVPB (MB+)  2 g Intravenous On Call Procedure Cosme Chaidez MD        ceFAZolin 2 g in sodium chloride 0.9 % 50 mL IVPB (MB+)  2 g Intravenous Q8H ROZ Ronquillo MD        haloperidol lactate injection 0.5 mg  0.5 mg Intravenous Q10 Min PRN Luiza Larsen MD        HYDROmorphone injection 0.2 mg  0.2 mg Intravenous Q5 Min PRN Luiza Larsen MD   0.2 mg at 05/02/24 1348    HYDROmorphone injection 0.2 mg  0.2 mg Intravenous Q3H PRN ROZ Ronquillo MD        melatonin tablet 6 mg  6 mg Oral Nightly PRN ROZ Ronquillo MD        methocarbamoL tablet 750 mg  750 mg Oral QID ROZ Ronquillo MD        oxyCODONE immediate release tablet 10 mg  10 mg Oral Q4H PRN ROZ Ronquillo MD        oxyCODONE immediate release tablet 5 mg  5 mg Oral Q4H PRN ROZ Ronquillo MD   5 mg at 05/02/24 1347    prochlorperazine injection Soln 5 mg  5 mg Intravenous Q30 Min PRN Luiza Larsen MD         Objective:     Vital Signs (Most Recent):  Temp: 98.4 °F (36.9 °C) (05/02/24 0854)  Pulse: 74 (05/02/24 1325)  Resp: 18 (05/02/24 1348)  BP: (!) 160/89 (05/02/24 0854)  SpO2: 100 % (05/02/24 1325) Vital Signs (24h Range):  Temp:  [98.4 °F (36.9 °C)] 98.4 °F (36.9 °C)  Pulse:  [71-74] 74  Resp:  [14-18] 18  SpO2:  [100 %] 100 %  BP: (160)/(89) 160/89     Weight: 91.2 kg (201 lb)  Height: 5' 9" (175.3 cm)  Body mass index is 29.68 " kg/m².      Intake/Output Summary (Last 24 hours) at 5/2/2024 1354  Last data filed at 5/2/2024 1200  Gross per 24 hour   Intake 1000 ml   Output --   Net 1000 ml        Ortho/SPM Exam  Bilateral upper extremities SILT   AIN/PIN/Ulnar nerves intact   2+ radial and ulnar arteries bilaterally   Dressings CDI   Drains x1 in place        Significant Labs: All pertinent labs within the past 24 hours have been reviewed.    Significant Imaging: I have reviewed and interpreted all pertinent imaging results/findings.

## 2024-05-02 NOTE — PLAN OF CARE
Patient has a cut on the Left side of his neck from shaving. States MD instructed him to shave his neck last night. Charge nurse informed.

## 2024-05-02 NOTE — DISCHARGE INSTRUCTIONS
DR. EVELIA COCHRAN'S POSTOPERATIVE INSTRUCTIONS -   ANTERIOR CERVICAL DISKECTOMY AND FUSION       Antibiotics: You do not need additional antibiotics at home.    NSAIDs: Please refrain from taking ibuprofen (Advil), naproxen (Aleve), and other non steroidal anti-inflammatory medications other than the Celebrex that will be prescribed to you after surgery.    Wound Care: You may remove your dressing and shower 5 days after surgery. Until then please keep your wound clean and dry. Sponge baths are acceptable. Do not go in a pool or hot tub until seen in clinic. Please leave the small steri-strips covering your wound in place until they fall off naturally (2 weeks). You may notice clear suture ends hanging from the sides of your incision after the steri-strips are removed, it is ok to clip these with scissors.    Cervical Collar: If given a collar after surgery you should wear it at all times. The only times it may be removed are for eating and showering.    Pain: We will use a multimodal approach for pain management after your surgery.  You will be given a prescription for pain medicine when you are discharged from the hospital.  You will also be given prescriptions for Robaxin (a muscle relaxer), Gabapentin, Celebrex and Tylenol.  Please note: you will only be given ONE prescription for narcotics when you are discharged from the hospital.  This medication is for breakthrough pain only. This medication will not be refilled.  The other medications given to you may be refilled if needed.      Difficulty Swallowing: This is very common in the postoperative period. You may find it easier to eat a pureed diet for the first 1-2 weeks after surgery. Ice chips and menthol cough drops may also be soothing.    Difficulty Breathing: This is uncommon after surgery and may represent dangerous swelling in your neck. If you experience difficulty breathing please call 911 immediately.    Infection: Signs of infection include increasing  wound drainage and redness around the wound, as well as a temperature over 101.5 degrees. It is unnecessary to take your temperature on a routine basis. Please call the above number if you are concerned about an infection.    Driving and Work: It is ok to return to driving and work as long as you are not taking narcotic pain medications or wearing your cervical collar. Please do not lift over 5 pounds or participate in exercise or sports until cleared by Dr. Chaidez.    Deep Venous Thrombosis (Blood Clots): Symptoms include swelling in the legs and shortness of breath. Please call the office or proceed to the nearest emergency room if you have any of these symptoms.    Physical Therapy: The best physical therapy immediately after surgery is walking. Please try to walk as much as possible.    Follow-up: You will be scheduled for a follow-up appointment in 4 weeks with either Dr. Chaidez or his physician assistant, Renetta Penn PA-C.    Questions: During business hours please call [phone] for routine questions. For after hours questions please call [phone] and ask to speak with the Orthopaedic resident on call.    Disability: If you submitted short term disability paperwork for us to complete and would like to check the status, please call the Disability Department at [phone].  You may fax any necessary paperwork to [phone].

## 2024-05-02 NOTE — PT/OT/SLP PROGRESS
Occupational Therapy      Patient Name:  Heraclio Lam   MRN:  [id number]    Patient not seen today secondary to pt with increased nausea upon OT attempt. OT attempted pt at 1521. Pt reclined in bedside chair upon OT arrival. OT received the below personal information:    Pt lives with mom and dad in 2SH, 0STE, tub/shower combo with grab bars. Pt lives on 1st floor. Pt reports being (I) with ADLs and functional mobility. Pt states he does not drive 2/2 increased numbness/paresthesia in BUE. Pt is retired and enjoys walking his dogs. Pt reports his mother can assist at d/c; however, his mother is the primary caregiver for his father.       After receiving personal history, OT attempted to begin evaluation. Upon sitting pt up in chair from reclined position, pt reporting increased and significant nausea that did not resolve after sitting ~ 3 min. Pt unable to meaningfully participate in OT evaluation and treatment. OT provided education on spinal precautions with pt verbalizing understanding. No units charged. Orders seen and reviewed. Will follow-up tomorrow morning.    5/2/2024

## 2024-05-03 VITALS
HEIGHT: 69 IN | HEART RATE: 75 BPM | DIASTOLIC BLOOD PRESSURE: 85 MMHG | TEMPERATURE: 98 F | SYSTOLIC BLOOD PRESSURE: 142 MMHG | WEIGHT: 201.06 LBS | RESPIRATION RATE: 16 BRPM | OXYGEN SATURATION: 99 % | BODY MASS INDEX: 29.78 KG/M2

## 2024-05-03 LAB
BUN SERPL-MCNC: 15 MG/DL (ref 6–30)
CHLORIDE SERPL-SCNC: 105 MMOL/L (ref 95–110)
CREAT SERPL-MCNC: 0.7 MG/DL (ref 0.5–1.4)
GLUCOSE SERPL-MCNC: 156 MG/DL (ref 70–110)
HCT VFR BLD CALC: 43 %PCV (ref 36–54)
POC IONIZED CALCIUM: 1.22 MMOL/L (ref 1.06–1.42)
POC TCO2 (MEASURED): 24 MMOL/L (ref 23–29)
POTASSIUM BLD-SCNC: 3.8 MMOL/L (ref 3.5–5.1)
SAMPLE: ABNORMAL
SODIUM BLD-SCNC: 142 MMOL/L (ref 136–145)

## 2024-05-03 PROCEDURE — 94799 UNLISTED PULMONARY SVC/PX: CPT

## 2024-05-03 PROCEDURE — 84295 ASSAY OF SERUM SODIUM: CPT

## 2024-05-03 PROCEDURE — 25000003 PHARM REV CODE 250

## 2024-05-03 PROCEDURE — 97165 OT EVAL LOW COMPLEX 30 MIN: CPT

## 2024-05-03 PROCEDURE — 84132 ASSAY OF SERUM POTASSIUM: CPT

## 2024-05-03 PROCEDURE — 82803 BLOOD GASES ANY COMBINATION: CPT

## 2024-05-03 PROCEDURE — 97530 THERAPEUTIC ACTIVITIES: CPT

## 2024-05-03 PROCEDURE — 82330 ASSAY OF CALCIUM: CPT

## 2024-05-03 PROCEDURE — 99900035 HC TECH TIME PER 15 MIN (STAT)

## 2024-05-03 PROCEDURE — 63600175 PHARM REV CODE 636 W HCPCS

## 2024-05-03 PROCEDURE — 85014 HEMATOCRIT: CPT

## 2024-05-03 RX ADMIN — MUPIROCIN: 20 OINTMENT TOPICAL at 08:05

## 2024-05-03 RX ADMIN — ACETAMINOPHEN 650 MG: 325 TABLET ORAL at 06:05

## 2024-05-03 RX ADMIN — POLYETHYLENE GLYCOL 3350 17 G: 17 POWDER, FOR SOLUTION ORAL at 08:05

## 2024-05-03 RX ADMIN — METHOCARBAMOL 750 MG: 750 TABLET ORAL at 08:05

## 2024-05-03 RX ADMIN — DOCUSATE SODIUM 100 MG: 100 CAPSULE, LIQUID FILLED ORAL at 08:05

## 2024-05-03 RX ADMIN — FAMOTIDINE 20 MG: 20 TABLET, FILM COATED ORAL at 08:05

## 2024-05-03 RX ADMIN — CEFAZOLIN 2 G: 2 INJECTION, POWDER, FOR SOLUTION INTRAMUSCULAR; INTRAVENOUS at 01:05

## 2024-05-03 NOTE — DISCHARGE SUMMARY
[facility])  Orthopedics  Discharge Summary      Patient Name: Heraclio Lam  MRN: [id number]  Admission Date: 5/2/2024  Hospital Length of Stay: 0 days  Discharge Date and Time: 5/3/2024 10:42 AM  Attending Physician: No att. providers found   Discharging Provider: ROZ Ronquillo MD  Primary Care Provider: Louis Garay MD    HPI:   CHIEF COMPLAINT: neck and b/l shoulder pain     HISTORY:  Heraclio Lam is a 53 y.o. male w/ a hx of shunt for syrinx in 1999 and C6-7 ACDF in 2006 presents for initial evaluation of neck and b/l shoulder pain (Neck - 6, Shoulder - 6). The pain has been present for years. The patient describes the pain as dull and it radiates to both shoulders. The pain is worse with activity and improved by rest. There is BUE associated numbness and tingling. There is BUE subjective weakness. Prior treatments have included meds (ibuprofen and gabapentin), PT, and ESIs, but no recent surgery.      He also complained of years history of LBP that radiates to BLE. There is associated BLE n/t and weakness. He cannot walk far due to pain; he gets relief by leaning forward on a shopping cart.     The Patient endorses myelopathic symptoms such as handwriting changes or difficulty with buttons/coins/keys. He has trouble with walking/balance. Denies perineal paresthesias, bowel/bladder dysfunction.     The patient does not smoke, have DM or endorse IVDU. The patient is not on any blood thinners and does not take chronic narcotics. He is a retired .    Procedure(s) (LRB):  DISCECTOMY, SPINE, CERVICAL, ANTERIOR APPROACH, WITH FUSION C3-4 SPINEWAVE SNS: VOCAL CORDS/MOTORS/SSEP (N/A)      Hospital Course:  On 5/2, the patient arrived to the [facility] for proper pre-operative management.  Upon completion of pre-operative preparation, the patient was taken back to the operative theatre. C3-4 ACDF was performed without complication and the patient was  transported to the post anesthesia care unit in stable condition.  After appropriate recovery from the anaesthetic agents used during the surgery, the patient was then transported to the hospital inpatient floor.  The interim of the hospital stay from arrival on the floor up to discharge has been uncomplicated. The patient has tolerated regular diet.  The patient's pain has been controlled using a multimodal approach. Currently, the patient's pain is well controlled on an oral regimen.  The patient has been voiding without difficulty.  The patient began participation in physical therapy after surgery and has progressed throughout the entire hospital stay.  Currently, the patient's progress is sufficient to allow the them to be discharged to home safely.  The patient agrees with this assessment and desires a discharge today.      Goals of Care Treatment Preferences:  Code Status: Full Code          Significant Diagnostic Studies: No pertinent studies.    Pending Diagnostic Studies:       None          There are no hospital problems to display for this patient.     Discharged Condition: good    Disposition: Home or Self Care    Follow Up:    Patient Instructions:      Notify your health care provider if you experience any of the following:  increased confusion or weakness     Notify your health care provider if you experience any of the following:  persistent dizziness, light-headedness, or visual disturbances     Notify your health care provider if you experience any of the following:  worsening rash     Notify your health care provider if you experience any of the following:  severe persistent headache     Notify your health care provider if you experience any of the following:  difficulty breathing or increased cough     Notify your health care provider if you experience any of the following:  redness, tenderness, or signs of infection (pain, swelling, redness, odor or green/yellow discharge around incision site)      Notify your health care provider if you experience any of the following:  severe uncontrolled pain     Notify your health care provider if you experience any of the following:  persistent nausea and vomiting or diarrhea     Notify your health care provider if you experience any of the following:  temperature >100.4     Medications:  Reconciled Home Medications:      Medication List        START taking these medications      acetaminophen 500 MG tablet  Commonly known as: TYLENOL  Take 2 tablets (1,000 mg total) by mouth every 8 (eight) hours.     celecoxib 100 MG capsule  Commonly known as: CeleBREX  Take 1 capsule (100 mg total) by mouth 2 (two) times daily. for 28 doses     methocarbamoL 500 MG Tab  Commonly known as: ROBAXIN  Take 2 tablets (1,000 mg total) by mouth 3 (three) times daily. for 90 doses     oxyCODONE 5 MG immediate release tablet  Commonly known as: ROXICODONE  Take 1 tablet (5 mg total) by mouth every 6 (six) hours as needed for Pain. May take 1-2 tablets every 6 hours as needed for pain            CONTINUE taking these medications      amitriptyline 25 MG tablet  Commonly known as: ELAVIL  Take 1 tablet (25 mg total) by mouth nightly as needed for Insomnia.     famotidine 20 MG tablet  Commonly known as: PEPCID  Take 1 tablet (20 mg total) by mouth 2 (two) times daily.     gabapentin 300 MG capsule  Commonly known as: NEURONTIN  Take 1 capsule (300 mg total) by mouth 2 (two) times daily AND 2 capsules (600 mg total) every evening.     rosuvastatin 20 MG tablet  Commonly known as: CRESTOR  TAKE 1 TABLET BY MOUTH ONCE  DAILY              ROZ Ronquillo MD  Orthopedics  [facility]

## 2024-05-03 NOTE — PT/OT/SLP PROGRESS
"Physical Therapy Treatment and Discharge    Patient Name:  Heraclio Lam   MRN:  [id number]    Recommendations:     Discharge Recommendations: No Therapy Indicated  Discharge Equipment Recommendations: none  Barriers to discharge: None    Assessment:     Heraclio Lam is a 53 y.o. male admitted with a medical diagnosis of s/p C3-4 ACDF.  He presents with the following impairments/functional limitations: impaired endurance, impaired functional mobility, impaired self care skills, gait instability, impaired coordination, pain, orthopedic precautions. Patient tolerated PT session well. He ambulated ~420ft with no AD and supervision. He ascended/descended 6" step with RW and supervision. He ascended/descended 4 steps with bilateral handrails and supervision. He was able to recall 2/3 spinal precautions. He has no further needs for PT while in the hospital.     Rehab Prognosis: Good; patient would benefit from acute skilled PT services to address these deficits and reach maximum level of function.    Recent Surgery: Procedure(s) (LRB):  DISCECTOMY, SPINE, CERVICAL, ANTERIOR APPROACH, WITH FUSION C3-4 SPINEWAVE SNS: VOCAL CORDS/MOTORS/SSEP (N/A) 1 Day Post-Op    Plan:     During this hospitalization, patient to be seen daily to address the identified rehab impairments via gait training, therapeutic activities, therapeutic exercises and progress toward the following goals:    Plan of Care Expires:  05/03/24    Subjective     Chief Complaint: Neck pain.   Patient/Family Comments/goals: To get back to PLOF.   Pain/Comfort:  Pain Rating 1: 2/10  Location - Orientation 1: anterior  Location 1: neck  Pain Addressed 1: Reposition, Distraction      Objective:     Communicated with RN prior to session.  Patient found HOB elevated with SCD upon PT entry to room. Mother present in room.     General Precautions: Standard, fall  Orthopedic Precautions: spinal precautions  Braces: N/A  Respiratory Status: Room air   " "  Functional Mobility:  Bed Mobility:     Scooting: supervision  Supine to Sit: supervision  Transfers:     Sit to Stand:  supervision with no AD x1 from bed  Gait: Patient ambulated ~420ft with No Assistive Device and supervision.   Stairs: He ascended/descended 6" step with RW and supervision. He ascended/descended 4 steps with bilateral handrails and supervision.      AM-PAC 6 CLICK MOBILITY  Turning over in bed (including adjusting bedclothes, sheets and blankets)?: 4  Sitting down on and standing up from a chair with arms (e.g., wheelchair, bedside commode, etc.): 4  Moving from lying on back to sitting on the side of the bed?: 4  Moving to and from a bed to a chair (including a wheelchair)?: 4  Need to walk in hospital room?: 4  Climbing 3-5 steps with a railing?: 4  Basic Mobility Total Score: 24       Treatment & Education:  Patient and mother educated in:  -PT role and POC  -spinal precautions   -safety with transfers including hand placement  -OOB activity to maximize recovery including ambulating at home to prevent DVT       Patient left up in chair with all lines intact, call button in reach, RN notified, and mother present.    GOALS:   Multidisciplinary Problems       Physical Therapy Goals          Problem: Physical Therapy    Goal Priority Disciplines Outcome Goal Variances Interventions   Physical Therapy Goal     PT, PT/OT Progressing     Description: Goals to be met by: 5/3/24     Patient will increase functional independence with mobility by performin. Supine to sit with Supervision  2. Sit to stand transfer with Supervision  3. Gait  x 300 feet with Supervision using No Assistive Device.   4. Ascend/Descend 6 inch curb step with Stand-by Assistance using No Assistive Device  5. Pt to verbalize 3/3 recall of spinal precautions and demonstrate good understanding with his mobiltiy.                         Time Tracking:     PT Received On: 24  PT Start Time: 0856     PT Stop Time: " 0907  PT Total Time (min): 11 min     Billable Minutes: Therapeutic Activity 11    Treatment Type: Treatment  PT/PTA: PT     Number of PTA visits since last PT visit: 0     05/03/2024

## 2024-05-03 NOTE — PLAN OF CARE
Problem: Wound  Goal: Optimal Coping  Intervention: Support Patient and Family Response  Flowsheets (Taken 5/3/2024 0711)  Supportive Measures: decision-making supported     Problem: Wound  Goal: Improved Oral Intake  Intervention: Promote and Optimize Oral Intake  Flowsheets (Taken 5/3/2024 0711)  Oral Nutrition Promotion: physical activity promoted     Problem: Skin Injury Risk Increased  Goal: Skin Health and Integrity  Intervention: Optimize Skin Protection  Flowsheets (Taken 5/3/2024 0711)  Activity Management: Ambulated in joseph - L4  Head of Bed (HOB) Positioning: HOB elevated     Problem: Skin Injury Risk Increased  Goal: Skin Health and Integrity  Intervention: Promote and Optimize Oral Intake  Flowsheets (Taken 5/3/2024 0711)  Oral Nutrition Promotion: physical activity promoted

## 2024-05-03 NOTE — NURSING
Patient discharged from unit. Pt A/Ox4, VSS, no s/s of distress noted. Pt denies pain at this time. Dressing to anterior neck remains C/D/I. Discharge instructions verbally given to pt and pt's mother and placed in DC folder; pt verbalized understanding. Home meds and F/U appointments reviewed as well. VS obtained; IVs removed from left hand and right hand with catheters intact. No redness or swelling to areas. Meds delivered to bedside prior to discharge. Pt left unit via w/c and was accompanied to front of hospital to meet ride. All personal belongings sent with pt. Pt given instructions to call  with any surgery-related issues.

## 2024-05-03 NOTE — PLAN OF CARE
Problem: Adult Inpatient Plan of Care  Goal: Plan of Care Review  Outcome: Progressing  Flowsheets (Taken 5/3/2024 0711)  Plan of Care Reviewed With: patient     Problem: Adult Inpatient Plan of Care  Goal: Readiness for Transition of Care  Outcome: Progressing     Problem: Wound  Goal: Optimal Coping  Intervention: Support Patient and Family Response  Flowsheets (Taken 5/3/2024 0711)  Supportive Measures: decision-making supported     Problem: Wound  Goal: Improved Oral Intake  Intervention: Promote and Optimize Oral Intake  Flowsheets (Taken 5/3/2024 0711)  Oral Nutrition Promotion: physical activity promoted     Problem: Skin Injury Risk Increased  Goal: Skin Health and Integrity  Intervention: Optimize Skin Protection  Flowsheets (Taken 5/3/2024 0711)  Activity Management: Ambulated in joseph - L4  Head of Bed (HOB) Positioning: HOB elevated     Problem: Skin Injury Risk Increased  Goal: Skin Health and Integrity  Intervention: Promote and Optimize Oral Intake  Flowsheets (Taken 5/3/2024 0711)  Oral Nutrition Promotion: physical activity promoted

## 2024-05-03 NOTE — NURSING
Report received from MARIA EUGENIA Rachel, care assumed, pt is AAOx4, sitting up in chair, safety maintained, dressing to anterior neck is CDI, chair locked in position, call light and bedside table within reach, NAD noted or voiced concerns at this time, POC reviewed with pt, all questions answered, pt reminded to use call light for all requests and assistance, pt verbalized understanding, will continue to monitor.

## 2024-05-03 NOTE — PROGRESS NOTES
[facility])  Orthopedics  Progress Note    Patient Name: Heraclio Lam  MRN: [id number]  Admission Date: 5/2/2024  Hospital Length of Stay: 0 days  Attending Provider: Cosme Chaidez MD  Primary Care Provider: Louis Garay MD  Follow-up For: Procedure(s) (LRB):  DISCECTOMY, SPINE, CERVICAL, ANTERIOR APPROACH, WITH FUSION C3-4 SPINEWAVE SNS: VOCAL CORDS/MOTORS/SSEP (N/A)    Post-Operative Day: 1 Day Post-Op  Subjective:     Principal Problem:<principal problem not specified>    Principal Orthopedic Problem: s/p C3-4 ACDF    Interval History: Patient seen and examined at bedside. NAEON. Patient doing well. No complaints. Pain well controlled. Patient mobilized yesterday with PT, walked 160ft yesterday.    Review of patient's allergies indicates:  No Known Allergies    Current Facility-Administered Medications   Medication Dose Route Frequency Provider Last Rate Last Admin    acetaminophen tablet 650 mg  650 mg Oral Q6H ROZ Ronquillo MD        ceFAZolin 2 g in dextrose 5 % in water (D5W) 50 mL IVPB (MB+)  2 g Intravenous On Call Procedure Cosme Chaidez MD        ceFAZolin 2 g in sodium chloride 0.9 % 50 mL IVPB (MB+)  2 g Intravenous Q8H ROZ Ronquillo MD        haloperidol lactate injection 0.5 mg  0.5 mg Intravenous Q10 Min PRN Luiza Larsen MD        HYDROmorphone injection 0.2 mg  0.2 mg Intravenous Q5 Min PRN Luiza Larsen MD   0.2 mg at 05/02/24 1348    HYDROmorphone injection 0.2 mg  0.2 mg Intravenous Q3H PRN ROZ Ronquillo MD        melatonin tablet 6 mg  6 mg Oral Nightly PRN ROZ Ronquillo MD        methocarbamoL tablet 750 mg  750 mg Oral QID ROZ Ronquillo MD        oxyCODONE immediate release tablet 10 mg  10 mg Oral Q4H PRN ROZ Ronquillo MD        oxyCODONE immediate release tablet 5 mg  5 mg Oral Q4H PRN ROZ Ronquillo MD   5 mg at 05/02/24 1347    prochlorperazine injection Soln 5 mg  5 mg Intravenous Q30 Min PRN Luiza Larsen MD   "       Objective:     Vital Signs (Most Recent):  Temp: 98.4 °F (36.9 °C) (05/02/24 0854)  Pulse: 74 (05/02/24 1325)  Resp: 18 (05/02/24 1348)  BP: (!) 160/89 (05/02/24 0854)  SpO2: 100 % (05/02/24 1325) Vital Signs (24h Range):  Temp:  [98.4 °F (36.9 °C)] 98.4 °F (36.9 °C)  Pulse:  [71-74] 74  Resp:  [14-18] 18  SpO2:  [100 %] 100 %  BP: (160)/(89) 160/89     Weight: 91.2 kg (201 lb)  Height: 5' 9" (175.3 cm)  Body mass index is 29.68 kg/m².      Intake/Output Summary (Last 24 hours) at 5/2/2024 1354  Last data filed at 5/2/2024 1200  Gross per 24 hour   Intake 1000 ml   Output --   Net 1000 ml        Ortho/SPM Exam  Bilateral upper extremities SILT   AIN/PIN/Ulnar nerves intact   2+ radial and ulnar arteries bilaterally   Dressings CDI   Drains x1 in place        Significant Labs: All pertinent labs within the past 24 hours have been reviewed.    Significant Imaging: I have reviewed and interpreted all pertinent imaging results/findings.  Assessment/Plan:     Cervical spondylosis with radiculopathy  Heraclio Lma is a 53 y.o. male is s/p C3-4 ACDF on 5/2    Surgical dressing C/D/I  Pain control: multimodal  PT/OT: WBAT BLE, spine precautions  DVT PPx: none ppx, FCDs at all times when not ambulating  Abx: postop Ancef x 24hrs   Drain: x1   Bosch: Remove POD 1   Nursing: Incentive spirometry, Monitor and record drain output each shift     Dispo: plan to dc home today    Output by Drain (mL) 05/01/24 0701 - 05/01/24 1900 05/01/24 1901 - 05/02/24 0700 05/02/24 0701 - 05/02/24 1900 05/02/24 1901 - 05/03/24 0700 05/03/24 0701 - 05/03/24 0721        Closed/Suction Drain 05/02/24 Tube - 1 Anterior Neck Bulb   35 25                 ROZ Ronquillo MD  Orthopedics  [place] [facility]    "

## 2024-05-03 NOTE — PT/OT/SLP EVAL
Occupational Therapy Evaluation and Discharge    Name: Heraclio Lam  MRN: [id number]  Admitting Diagnosis:  s/p C3-4 ACDF.  1 Day Post-Op  Recent Surgery: Procedure(s) (LRB):  DISCECTOMY, SPINE, CERVICAL, ANTERIOR APPROACH, WITH FUSION C3-4 SPINEWAVE SNS: VOCAL CORDS/MOTORS/SSEP (N/A) 1 Day Post-Op    Recommendations:     Discharge Recommendations: No Therapy Indicated  Discharge Equipment Recommendations: none  Barriers to discharge: None    Assessment:     Heraclio Lam is a 53 y.o. male with a medical diagnosis of s/p C3-4 ACDF.  Pt evaluated earlier this am. He has since d/c from the hospital.  Pt performed ADLs and mobility with independence and doesn't require further OT services.  He presents with performance deficits affecting function including pain, orthopedic precautions.       Plan:     Patient has since d/c from the hospital and doesn't require further OT services.  Plan of Care Expires: 05/03/24  Plan of Care Reviewed with: patient, mother    Subjective     Chief Complaint: neck pain  Patient Comments/Goals: to return home  Pain/Comfort:  Pain Rating 1: 2/10  Location - Orientation 1: anterior  Location 1: neck  Pain Addressed 1: Distraction, Reposition  Pain Rating Post-Intervention 1: 2/10    Patients cultural, spiritual, Sabianist conflicts given the current situation: no    Social History:  Living Environment: Patient lives with his mother and father in a 2 story home with  0 CECILIA.  He lives on the 1st floor and has a tub/shower combo with grab bars.   Prior Level of Function: Prior to admission, patient was independent with ADLs and mobility.  He doesn't drive.   Roles and Routines: Son; retired prior to admission.  Equipment Used at Home: grab bar  DME owned (not currently used): none  Assistance Upon Discharge:  His mother, who is caregiver to his father.    Objective:     Communicated with nurse prior to session. Patient found  ambulating in the bathroom  with Other (comments)  (no active lines attached) with his mother present upon OT entry to room.    General Precautions: Standard, fall   Orthopedic Precautions: spinal precautions   Braces: N/A    Respiratory Status: Room air    Occupational Performance    Bed Mobility:   N/A due to pt's standing in the bathroom at the beginning of session and seated in bedside chair at end of session    Functional Mobility/Transfers:  Bed <> Chair Transfer using Step Transfer technique with independence with no AD  Functional Mobility: Pt ambulated ~15 ft from the bathroom to the bedside chair with independence with no AD.    Activities of Daily Living:  Grooming: independence to wash his hands while standing at bathroom sink.  Pt reported performing oral care earlier independently.   Upper Body Dressing: independence to anitra front and back hospital gowns while standing at bathroom sink  Lower Body Dressing: modified independence to reach and adjust non-skid socks while seated in bedside chair  Toileting: Pt reports toileting independently.     Cognitive/Visual Perceptual:  Cognitive/Psychosocial Skills:    -     Oriented to: Person, Place, Time, Situation  -     Follows Commands/attention: Follows multistep  commands  -     Communication: clear/fluent    Physical Exam:  Upper Extremity Range of Motion:     -       Right Upper Extremity: WFL  -       Left Upper Extremity: WFL  Upper Extremity Strength:    -       Right Upper Extremity: WFL  -       Left Upper Extremity: WFL   Strength:    -       Right Upper Extremity: WFL  -       Left Upper Extremity: WFL  Fine Motor Coordination:    -       Intact    AMPAC 6 Click ADL:  AMPAC Total Score: 24    Treatment & Education:  Pt and his mother edu on role of OT, POC, his spinal precautions, safety when performing self care tasks, benefit of performing OOB activity, and safety when performing functional transfers and mobility.  - Self care tasks completed-- as noted above        Patient left up in chair  with call button in reach and his mother and radiology tech present.    GOALS:   Multidisciplinary Problems       Occupational Therapy Goals       Not on file              Multidisciplinary Problems (Resolved)          Problem: Occupational Therapy    Goal Priority Disciplines Outcome Interventions   Occupational Therapy Goal   (Resolved)     OT, PT/OT Met                        History:     Past Medical History:   Diagnosis Date    Arthritis          Past Surgical History:   Procedure Laterality Date    ANTERIOR CERVICAL DISCECTOMY W/ FUSION N/A 5/2/2024    Procedure: DISCECTOMY, SPINE, CERVICAL, ANTERIOR APPROACH, WITH FUSION C3-4 SPINEWAVE SNS: VOCAL CORDS/MOTORS/SSEP;  Surgeon: Cosme Chaidez MD;  Location: [facility] OR;  Service: Orthopedics;  Laterality: N/A;    COLONOSCOPY N/A 11/29/2022    Procedure: COLONOSCOPY;  Surgeon: Cody Valle MD;  Location: [facility] ENDO ([address] [place] [address]);  Service: Endoscopy;  Laterality: N/A;  instructions via portal and mail -   11/22 pre call left message -     CSF SHUNT      EPIDURAL STEROID INJECTION N/A 12/4/2018    Procedure: Injection, Steroid, Epidural CERVICAL T1-2 INTERLAMINAR BATSHEVA;  Surgeon: Elba Juarez MD;  Location: [facility] PAIN MGT;  Service: Pain Management;  Laterality: N/A;    EPIDURAL STEROID INJECTION N/A 1/8/2019    Procedure: Injection, Steroid, Epidural CERVICAL T1-2 IL BATSHEVA;  Surgeon: Elba Juarez MD;  Location: [facility] PAIN MGT;  Service: Pain Management;  Laterality: N/A;    EPIDURAL STEROID INJECTION N/A 8/20/2020    Procedure: INJECTION, STEROID, EPIDURAL T1-2 IL BATSHEVA;  Surgeon: Elba Juarez MD;  Location: [facility] PAIN MGT;  Service: Pain Management;  Laterality: N/A;  T1-2 IL BATSHEVA   consent needed    EPIDURAL STEROID INJECTION INTO LUMBAR SPINE Bilateral 3/14/2024    Procedure: B/L L4-5 TFESI;  Surgeon: Karson Longoria MD;  Location: [facility] PAIN MANAGEMENT;  Service: Pain Management;  Laterality: Bilateral;  20 mins    JOINT REPLACEMENT  2006    disc  Sx , cervical    SPINE SURGERY         Time Tracking:     OT Date of Treatment: 05/03/24  OT Start Time: 0942  OT Stop Time: 0950  OT Total Time (min): 8 min    Billable Minutes: Evaluation 8 min    5/3/2024

## 2024-05-03 NOTE — PLAN OF CARE
Problem: Occupational Therapy  Goal: Occupational Therapy Goal  Outcome: Met     Pt evaluated earlier this am.  He has since d/c from the hospital.  Pt performed ADLs and mobility with independence and doesn't require further OT services.

## 2024-05-09 DIAGNOSIS — Z98.1 S/P CERVICAL SPINAL FUSION: Primary | ICD-10-CM

## 2024-05-13 NOTE — PLAN OF CARE
[place] [facility]  Discharge Final Note    Primary Care Provider: Louis Garay MD    Expected Discharge Date: 5/3/2024    Final Discharge Note (most recent)       Final Note - 05/13/24 1218          Final Note    Assessment Type Final Discharge Note     Anticipated Discharge Disposition Home or Self Care     What phone number can be called within the next 1-3 days to see how you are doing after discharge? --   [phone]                    Important Message from Medicare           Future Appointments   Date Time Provider Department Center   5/30/2024  9:45 AM [facility]IC-XRAY [facility]   5/30/2024 10:45 AM Renetta Penn PA-C [facility] SPINE [address]     Patient discharged home to care of family on 5/3/24.    Trice Hairston RNCM  Case Management  [facility]  [phone]

## 2024-05-30 ENCOUNTER — HOSPITAL ENCOUNTER (OUTPATIENT)
Dept: RADIOLOGY | Facility: HOSPITAL | Age: 54
Discharge: HOME | End: 2024-05-30
Attending: ORTHOPAEDIC SURGERY
Payer: COMMERCIAL

## 2024-05-30 DIAGNOSIS — Z98.1 S/P CERVICAL SPINAL FUSION: ICD-10-CM

## 2024-05-30 PROCEDURE — 72040 X-RAY EXAM NECK SPINE 2-3 VW: CPT | Mod: TC

## 2024-05-30 PROCEDURE — 72040 X-RAY EXAM NECK SPINE 2-3 VW: CPT | Mod: 26,,, | Performed by: RADIOLOGY

## 2024-05-31 NOTE — PROGRESS NOTES
Date: 05/31/2024    Supervising Physician: Cosme Chaidez M.D.    Date of Surgery: 5/2/24    Procedure: C3-4 ACDF    History: Heraclio Lam is seen today for follow-up following the above listed procedure. Overall the patient is doing well but today notes he is feeling ok with some continued burning in his neck.   Pain is well controlled with current pain medication.  he denies fever, chills, and sweats since the time of the surgery.       Exam: Post op dressing taken down.  Incision is healing well, clean, dry and intact.   There is no sign of infection. Neuro exam is stable. No signs of DVT.    Radiographs: hardware in place no failure    Assessment/Plan: 4 weeks post op.    Doing well postoperatively.  reviewed.    I will plan to see the patient back for the next postop visit in 2 months.     Thank you for the opportunity to participate in this patient's care. Please give me a call if there are any concerns or questions.    Dr. Cosme Chaidez has personally examined the patient, answered all questions, and agreed with the above plan.

## 2024-06-03 ENCOUNTER — OFFICE VISIT (OUTPATIENT)
Dept: ORTHOPEDICS | Facility: CLINIC | Age: 54
End: 2024-06-03
Payer: COMMERCIAL

## 2024-06-03 VITALS — BODY MASS INDEX: 28.73 KG/M2 | HEIGHT: 69 IN | WEIGHT: 194 LBS

## 2024-06-03 DIAGNOSIS — Z98.1 S/P CERVICAL SPINAL FUSION: Primary | ICD-10-CM

## 2024-06-03 DIAGNOSIS — M50.30 DDD (DEGENERATIVE DISC DISEASE), CERVICAL: Primary | ICD-10-CM

## 2024-06-03 PROCEDURE — 3044F HG A1C LEVEL LT 7.0%: CPT | Mod: CPTII,S$GLB,, | Performed by: ORTHOPAEDIC SURGERY

## 2024-06-03 PROCEDURE — 99999 PR PBB SHADOW E&M-EST. PATIENT-LVL III: CPT | Mod: PBBFAC,,, | Performed by: ORTHOPAEDIC SURGERY

## 2024-06-03 PROCEDURE — 99024 POSTOP FOLLOW-UP VISIT: CPT | Mod: S$GLB,,, | Performed by: ORTHOPAEDIC SURGERY

## 2024-06-03 PROCEDURE — 1159F MED LIST DOCD IN RCRD: CPT | Mod: CPTII,S$GLB,, | Performed by: ORTHOPAEDIC SURGERY

## 2024-06-10 ENCOUNTER — PATIENT MESSAGE (OUTPATIENT)
Dept: INTERNAL MEDICINE | Facility: CLINIC | Age: 54
End: 2024-06-10
Payer: COMMERCIAL

## 2024-07-08 ENCOUNTER — PATIENT MESSAGE (OUTPATIENT)
Dept: ORTHOPEDICS | Facility: CLINIC | Age: 54
End: 2024-07-08
Payer: COMMERCIAL

## 2024-07-10 ENCOUNTER — HOSPITAL ENCOUNTER (EMERGENCY)
Facility: HOSPITAL | Age: 54
Discharge: HOME | End: 2024-07-10
Attending: EMERGENCY MEDICINE
Payer: COMMERCIAL

## 2024-07-10 VITALS
OXYGEN SATURATION: 99 % | TEMPERATURE: 99 F | HEART RATE: 63 BPM | WEIGHT: 194 LBS | SYSTOLIC BLOOD PRESSURE: 142 MMHG | DIASTOLIC BLOOD PRESSURE: 81 MMHG | RESPIRATION RATE: 16 BRPM | BODY MASS INDEX: 28.65 KG/M2

## 2024-07-10 DIAGNOSIS — M54.12 CERVICAL RADICULOPATHY: Primary | ICD-10-CM

## 2024-07-10 DIAGNOSIS — E78.5 HYPERLIPIDEMIA, UNSPECIFIED HYPERLIPIDEMIA TYPE: ICD-10-CM

## 2024-07-10 PROCEDURE — 99281 EMR DPT VST MAYX REQ PHY/QHP: CPT

## 2024-07-10 RX ORDER — ROSUVASTATIN CALCIUM 20 MG/1
20 TABLET, COATED ORAL
Qty: 90 TABLET | Refills: 0 | Status: SHIPPED | OUTPATIENT
Start: 2024-07-10

## 2024-07-10 NOTE — DISCHARGE INSTRUCTIONS
Follow-up with orthopedics or return to the emergency department sooner for any new or worsening symptoms.

## 2024-07-10 NOTE — ED NOTES
Pt had plate and screws placed in C4 in May. Pt has started having x4 extremity numbness and tingling with some burning sensations to neck and upper back that has gradually gotten worse. Denies bowel/bladder dysfunction. Reports increased difficulty walking.

## 2024-07-10 NOTE — ED PROVIDER NOTES
Encounter Date: 7/10/2024       History     Chief Complaint   Patient presents with    Neck Pain     Neck surgery on 5/2/24, steel plate placed in C4, burning pain down spine, pt thinks its nerve pain      54-year-old male with history of cervical spondylosis status post C3-C4 diskectomy and fusion with Dr. Chaidez on 05/02/2024 who presents to the emergency department with chief complaint of neck pain.  He endorses a burning pain in his neck that radiates down his spine to his low back.  He also endorses extremity weakness, numbness, tingling in his bilateral arms and legs.  He endorses difficulty walking.  He denies any bowel or bladder incontinence.  Denies fever.  Denies nausea or vomiting.  He has tried pain medication at home without improvement his symptoms. Patient is somewhat of a poor historian. Has difficulty giving a timeline of when his symptoms started, but seems to have been ongoing since the day after his surgery when he extended his neck. He cannot tell me what changed in his symptoms to prompt his ED visit today. He denies other worsening or alleviating factors.       Review of patient's allergies indicates:  No Known Allergies  Past Medical History:   Diagnosis Date    Arthritis      Past Surgical History:   Procedure Laterality Date    ANTERIOR CERVICAL DISCECTOMY W/ FUSION N/A 5/2/2024    Procedure: DISCECTOMY, SPINE, CERVICAL, ANTERIOR APPROACH, WITH FUSION C3-4 SPINEWAVE SNS: VOCAL CORDS/MOTORS/SSEP;  Surgeon: Cosme Chaidez MD;  Location: [facility];  Service: Orthopedics;  Laterality: N/A;    COLONOSCOPY N/A 11/29/2022    Procedure: COLONOSCOPY;  Surgeon: Cody Valle MD;  Location: [address]);  Service: Endoscopy;  Laterality: N/A;  instructions via portal and mail -   11/22 pre call left message -     CSF SHUNT      EPIDURAL STEROID INJECTION N/A 12/4/2018    Procedure: Injection, Steroid, Epidural CERVICAL T1-2 INTERLAMINAR BATSHEVA;  Surgeon: Elba Juarez MD;  Location:  [facility];  Service: Pain Management;  Laterality: N/A;    EPIDURAL STEROID INJECTION N/A 1/8/2019    Procedure: Injection, Steroid, Epidural CERVICAL T1-2 IL BATSHEVA;  Surgeon: Elba Juarez MD;  Location: [facility];  Service: Pain Management;  Laterality: N/A;    EPIDURAL STEROID INJECTION N/A 8/20/2020    Procedure: INJECTION, STEROID, EPIDURAL T1-2 IL BATSHEVA;  Surgeon: Elba Juarez MD;  Location: [facility];  Service: Pain Management;  Laterality: N/A;  T1-2 IL BATSHEVA   consent needed    EPIDURAL STEROID INJECTION INTO LUMBAR SPINE Bilateral 3/14/2024    Procedure: B/L L4-5 TFESI;  Surgeon: Karson Longoria MD;  Location: [facility];  Service: Pain Management;  Laterality: Bilateral;  20 mins    JOINT REPLACEMENT  2006    disc Sx , cervical    SPINE SURGERY       Family History   Problem Relation Name Age of Onset    Hypertension Mother      Kidney failure Father      Liver disease Father          CHILDERS cirrhosis s/p tx    Cancer Sister  62        sarcoma     Social History     Tobacco Use    Smoking status: Never    Smokeless tobacco: Never   Substance Use Topics    Alcohol use: Not Currently    Drug use: No     Review of Systems   Musculoskeletal:  Positive for neck pain.   Neurological:  Positive for weakness.       Physical Exam     Initial Vitals [07/10/24 1107]   BP Pulse Resp Temp SpO2   (!) 142/81 63 16 98.6 °F (37 °C) 99 %      MAP       --         Physical Exam    Vitals reviewed.  Constitutional: He appears well-developed and well-nourished. He is not diaphoretic. No distress.   HENT:   Head: Normocephalic and atraumatic.   Mouth/Throat: Oropharynx is clear and moist.   Eyes: EOM are normal. Pupils are equal, round, and reactive to light.   Neck: Neck supple.   Normal range of motion.  Cardiovascular:  Normal rate, regular rhythm, normal heart sounds and intact distal pulses.     Exam reveals no gallop and no friction rub.       No murmur heard.  Pulmonary/Chest: Breath sounds  normal. He has no wheezes. He has no rhonchi. He has no rales.   Abdominal: Abdomen is soft. Bowel sounds are normal. There is no abdominal tenderness. There is no rebound and no guarding.   Musculoskeletal:         General: Normal range of motion.      Cervical back: Normal range of motion and neck supple.     Neurological: He is alert and oriented to person, place, and time. He has normal strength. GCS score is 15. GCS eye subscore is 4. GCS verbal subscore is 5. GCS motor subscore is 6.   Strength 5/5 bilateral upper extremities. Decreased sensation to light touch to the left lower arm. Strength 5/5 bilateral lower extremities. Sensation to light touch intact. 2+ distal pulses. Compartments are soft. Ambulatory but seems hesitant to do so.    Skin: Skin is warm and dry. Capillary refill takes less than 2 seconds.   Psychiatric: He has a normal mood and affect. His behavior is normal. Judgment and thought content normal.         ED Course   Procedures  Labs Reviewed   HIV 1 / 2 ANTIBODY   HEPATITIS C ANTIBODY          Imaging Results    None          Medications - No data to display  Medical Decision Making  Emergent evaluation of a 54 y.o. male presenting to the emergency department complaining of neck pain, extremity weakness, numbness, tingling, difficulty walking. Patient is afebrile, hemodynamically stable, and non toxic appearing.   Will order labs, imaging, analgesia.     Differential diagnosis includes but is not limited to epidural abscess, epidural hematoma, myelopathy, radiculopathy.     Patient presenting with burning neck pain that radiates from his neck down to his low back with associated extremity weakness, numbness, tingling and difficulty walking. History is difficult to obtain, but he states that his symptoms began the day after his surgery. He states that his symptoms have persisted since then. Unclear as to what prompted his ED visit today, but his family member at bedside did mention that his  father is in the hospital. His physical exam is reassuring. He has no focal deficits and is ambulatory. Symptoms have been ongoing for over two months. Do not feel that emergent imaging is warranted at this time. He has a follow up appointment next week. Advise him to keep this appointment and his surgical team can decide what outpatient imaging is warranted. Return precautions given. All questions answered.   The patient was instructed to follow up with orthopedics or to return to the emergency department for worsening symptoms. The treatment plan was discussed with the patient who demonstrated understanding and comfort with plan. The patient's history, physical exam, and plan of care was discussed with and agreed upon with my supervising physician.                                         Clinical Impression:  Final diagnoses:  [M54.12] Cervical radiculopathy (Primary)          ED Disposition Condition    Discharge Stable          ED Prescriptions    None       Follow-up Information       Follow up With Specialties Details Why Contact Info    [facility] - Emergency Dept Emergency Medicine Go to   [address] [facility] [address]  [phone]    Cosme Chaidez MD Orthopedic Surgery Schedule an appointment as soon as possible for a visit   [address] [facility] [address]  [phone]               Noreen Friedman PAAZAR  07/10/24 4649

## 2024-07-10 NOTE — TELEPHONE ENCOUNTER
No care due was identified.  [facility] Embedded Care Due Messages. Reference number: [id number].   7/10/2024 12:51:03 AM CDT

## 2024-07-10 NOTE — TELEPHONE ENCOUNTER
Refill Routing Note   Medication(s) are not appropriate for processing by [facility] for the following reason(s):        Patient not seen by provider within 15 months    [facility] action(s):  Defer               Appointments  past 12m or future 3m with PCP    Date Provider   Last Visit   3/3/2023 Louis Garay MD   Next Visit   Visit date not found Louis Garay MD   ED visits in past 90 days: 0        Note composed:10:41 AM 07/10/2024

## 2024-07-15 ENCOUNTER — OFFICE VISIT (OUTPATIENT)
Dept: ORTHOPEDICS | Facility: CLINIC | Age: 54
End: 2024-07-15
Payer: COMMERCIAL

## 2024-07-15 ENCOUNTER — TELEPHONE (OUTPATIENT)
Dept: PAIN MEDICINE | Facility: CLINIC | Age: 54
End: 2024-07-15
Payer: COMMERCIAL

## 2024-07-15 ENCOUNTER — HOSPITAL ENCOUNTER (OUTPATIENT)
Dept: RADIOLOGY | Facility: HOSPITAL | Age: 54
Discharge: HOME | End: 2024-07-15
Attending: ORTHOPAEDIC SURGERY
Payer: COMMERCIAL

## 2024-07-15 VITALS — HEIGHT: 69 IN | WEIGHT: 194 LBS | BODY MASS INDEX: 28.73 KG/M2

## 2024-07-15 DIAGNOSIS — M47.816 LUMBAR SPONDYLOSIS: ICD-10-CM

## 2024-07-15 DIAGNOSIS — Z98.890 S/P SPINAL SURGERY: ICD-10-CM

## 2024-07-15 DIAGNOSIS — Z98.1 S/P CERVICAL SPINAL FUSION: ICD-10-CM

## 2024-07-15 DIAGNOSIS — M47.816 LUMBAR SPONDYLOSIS: Primary | ICD-10-CM

## 2024-07-15 DIAGNOSIS — Z98.890 S/P SPINAL SURGERY: Primary | ICD-10-CM

## 2024-07-15 PROCEDURE — 1159F MED LIST DOCD IN RCRD: CPT | Mod: CPTII,S$GLB,, | Performed by: ORTHOPAEDIC SURGERY

## 2024-07-15 PROCEDURE — 72040 X-RAY EXAM NECK SPINE 2-3 VW: CPT | Mod: TC

## 2024-07-15 PROCEDURE — 99024 POSTOP FOLLOW-UP VISIT: CPT | Mod: S$GLB,,, | Performed by: ORTHOPAEDIC SURGERY

## 2024-07-15 PROCEDURE — 99999 PR PBB SHADOW E&M-EST. PATIENT-LVL III: CPT | Mod: PBBFAC,,, | Performed by: ORTHOPAEDIC SURGERY

## 2024-07-15 PROCEDURE — 3044F HG A1C LEVEL LT 7.0%: CPT | Mod: CPTII,S$GLB,, | Performed by: ORTHOPAEDIC SURGERY

## 2024-07-15 PROCEDURE — 72040 X-RAY EXAM NECK SPINE 2-3 VW: CPT | Mod: 26,,, | Performed by: RADIOLOGY

## 2024-07-15 RX ORDER — DIAZEPAM 2 MG/1
2 TABLET ORAL
Qty: 2 TABLET | Refills: 0 | Status: SHIPPED | OUTPATIENT
Start: 2024-07-15 | End: 2024-08-14

## 2024-07-15 RX ORDER — METHYLPREDNISOLONE 4 MG/1
TABLET ORAL
Qty: 21 TABLET | Refills: 0 | Status: SHIPPED | OUTPATIENT
Start: 2024-07-15 | End: 2024-08-05

## 2024-07-15 NOTE — PROGRESS NOTES
Date: 07/15/2024    Supervising Physician: Cosme Chaidez M.D.    Date of Surgery:  5/2/24     Procedure: C3-4 ACDF    History: Heraclio Lam is seen today for follow-up following the above listed procedure. Overall the patient is doing well but today notes he continues to have pain in his neck and down both arms as well as burning down both legs.  He is taking gabapentin with no relief.  he denies fever, chills, and sweats since the time of the surgery.       Exam: Post op dressing taken down.  Incision is healing well, clean, dry and intact.   There is no sign of infection. Neuro exam is stable. No signs of DVT.    Radiographs: hardware in place no failure    Assessment/Plan: 2.5 months post op.    Will obtain MRI cervical to further evaluate and call with results. Will send medrol dose pack to pharmacy. Will order bilateral L4-5 TFESI with pain management. If pain persists will consider L3-5 TLIF    Thank you for the opportunity to participate in this patient's care. Please give me a call if there are any concerns or questions.

## 2024-07-15 NOTE — TELEPHONE ENCOUNTER
----- Message from Renetta Penn PA-C sent at 7/15/2024  1:22 PM CDT -----  Regarding: Order for ELPIDIO LOCKETT    Patient Name: ELPIDIO LOCKETT([id number])  Sex: Male  : 1970      PCP: TRISH CAMERON    Center: [facility] CENTRAL BILLING OFFICE     Types of orders made on 07/15/2024: Imaging, Medications, Procedure Request    Order Date:7/15/2024  Ordering User:RENETTA PENN [[id number]]  Encounter Provider:Renetta Penn PA-C [[id number]]  Authorizing Provider: Renetta Penn PA-C [[id number]]  Supervising Provider:EVELIA COCHRAN [[id number]]  Type of Supervision:Supervision Required  Department:[facility][[id number]]    Common Order Information  Procedure -> Transforaminal Injection (Specify level and laterality) Cmt: bilat             L4-5    Order Specific Information  Order: Procedure Order to Pain Management [Cus  natalie: [id number]]  Order #:          [id number]: 1 FUTURE    Priority: Routine  Class: Clinic Performed    Future Order Information      Expires on:07/15/2025            Expected by:07/15/2024                   Associated Diagnoses      M47.816 Lumbar spondylosis      Facility Name: -> [facility]           Priority: Routine  Class: Clinic Performed    Future Order Information      Expires   on:07/15/2025            Expected by:07/15/2024                   Associated Diagnoses      M47.816 Lumbar spondylosis      Procedure -> Transforaminal Injection (Specify level and laterality) Cmt:                 bilat L4-5        Facility Name: -> [facility]

## 2024-07-15 NOTE — TELEPHONE ENCOUNTER
----- Message from Renetta Penn PA-C sent at 7/15/2024  1:22 PM CDT -----  Regarding: Order for ELPIDIO LOCKETT    Patient Name: ELPIDIO LOCKETT([id number])  Sex: Male  : 1970      PCP: TRISH CAMERON    Center: [facility] CENTRAL BILLING OFFICE     Types of orders made on 07/15/2024: Imaging, Medications, Procedure Request    Order Date:7/15/2024  Ordering User:RENETTA PENN [[id number]]  Encounter Provider:Renetta Penn PA-C [[id number]]  Authorizing Provider: Renetta Penn PA-C [[id number]]  Supervising Provider:EVELIA COCHRAN [[id number]]  Type of Supervision:Supervision Required  Department:[facility][[id number]]    Common Order Information  Procedure -> Transforaminal Injection (Specify level and laterality) Cmt: bilat             L4-5    Order Specific Information  Order: Procedure Order to Pain Management [Cus  natalie: [id number]]  Order #:          [id number]: 1 FUTURE    Priority: Routine  Class: Clinic Performed    Future Order Information      Expires on:07/15/2025            Expected by:07/15/2024                   Associated Diagnoses      M47.816 Lumbar spondylosis      Facility Name: -> [facility]           Priority: Routine  Class: Clinic Performed    Future Order Information      Expires   on:07/15/2025            Expected by:07/15/2024                   Associated Diagnoses      M47.816 Lumbar spondylosis      Procedure -> Transforaminal Injection (Specify level and laterality) Cmt:                 bilat L4-5        Facility Name: -> [facility]

## 2024-07-25 ENCOUNTER — TELEPHONE (OUTPATIENT)
Dept: PAIN MEDICINE | Facility: CLINIC | Age: 54
End: 2024-07-25
Payer: COMMERCIAL

## 2024-07-28 ENCOUNTER — PATIENT MESSAGE (OUTPATIENT)
Dept: ORTHOPEDICS | Facility: CLINIC | Age: 54
End: 2024-07-28
Payer: COMMERCIAL

## 2024-07-29 ENCOUNTER — TELEPHONE (OUTPATIENT)
Dept: ORTHOPEDICS | Facility: CLINIC | Age: 54
End: 2024-07-29
Payer: COMMERCIAL

## 2024-07-29 NOTE — TELEPHONE ENCOUNTER
Spoke to patient mother Mrs. Lam regarding message. Mrs. Lam stated that he have an appointment. Also stated that she gave him some medication  that help him. She stated thank you for calling. Thanks.

## 2024-07-30 ENCOUNTER — HOSPITAL ENCOUNTER (OUTPATIENT)
Dept: RADIOLOGY | Facility: OTHER | Age: 54
Discharge: HOME | End: 2024-07-30
Attending: ORTHOPAEDIC SURGERY
Payer: COMMERCIAL

## 2024-07-30 DIAGNOSIS — Z98.1 S/P CERVICAL SPINAL FUSION: ICD-10-CM

## 2024-07-30 PROCEDURE — 25500020 PHARM REV CODE 255: Performed by: ORTHOPAEDIC SURGERY

## 2024-07-30 PROCEDURE — 72156 MRI NECK SPINE W/O & W/DYE: CPT | Mod: TC

## 2024-07-30 PROCEDURE — A9585 GADOBUTROL INJECTION: HCPCS | Performed by: ORTHOPAEDIC SURGERY

## 2024-07-30 PROCEDURE — 72156 MRI NECK SPINE W/O & W/DYE: CPT | Mod: 26,,, | Performed by: RADIOLOGY

## 2024-07-30 RX ORDER — GADOBUTROL 604.72 MG/ML
9 INJECTION INTRAVENOUS
Status: COMPLETED | OUTPATIENT
Start: 2024-07-30 | End: 2024-07-30

## 2024-07-30 RX ADMIN — GADOBUTROL 9 ML: 604.72 INJECTION INTRAVENOUS at 11:07

## 2024-07-30 NOTE — PROGRESS NOTES
Established Patient - Audio Only Telehealth Visit     The patient location is: home  The chief complaint leading to consultation is: MRI results  Visit type: Virtual visit with audio only (telephone)  Total time spent with patient: 10 min       The reason for the audio only service rather than synchronous audio and video virtual visit was related to technical difficulties or patient preference/necessity.     Each patient to whom I provide medical services by telemedicine is:  (1) informed of the relationship between the physician and patient and the respective role of any other health care provider with respect to management of the patient; and (2) notified that they may decline to receive medical services by telemedicine and may withdraw from such care at any time. Patient verbally consented to receive this service via voice-only telephone call.      DATE: 7/30/2024  PATIENT: Heraclio Lam    Attending Physician: Cosme Chaidez M.D.    HISTORY:  Heraclio Lam is a 54 y.o. male who returns to me today for MRI results.  He was last seen by me 7/15/2024.  Today he is doing well but notes he is having neck pain and headaches. He is scheduled for a bilateral L4-5 TFESI tomorrow.    The Patient denies myelopathic symptoms such as handwriting changes or difficulty with buttons/coins/keys. Denies perineal paresthesias, bowel/bladder dysfunction.      EXAM:  There were no vitals taken for this visit.    My physical examination was notable for the following findings:     Musculoskeletal and neuro exam stable    IMAGING:    Today I personally re-reviewed AP, Lat and Flex/Ex  upright C-spine films that demonstrate hardware in place no failure    MRI cervical demonstrates decompression of spinal cord at C3-4    There is no height or weight on file to calculate BMI.    Hemoglobin A1C   Date Value Ref Range Status   04/10/2024 5.6 4.0 - 5.6 % Final     Comment:     ADA Screening Guidelines:  5.7-6.4%  Consistent  with prediabetes  >or=6.5%  Consistent with diabetes    High levels of fetal hemoglobin interfere with the HbA1C  assay. Heterozygous hemoglobin variants (HbS, HgC, etc)do  not significantly interfere with this assay.   However, presence of multiple variants may affect accuracy.     03/06/2023 5.1 4.0 - 5.6 % Final     Comment:     ADA Screening Guidelines:  5.7-6.4%  Consistent with prediabetes  >or=6.5%  Consistent with diabetes    High levels of fetal hemoglobin interfere with the HbA1C  assay. Heterozygous hemoglobin variants (HbS, HgC, etc)do  not significantly interfere with this assay.   However, presence of multiple variants may affect accuracy.     10/05/2022 5.3 4.0 - 5.6 % Final     Comment:     ADA Screening Guidelines:  5.7-6.4%  Consistent with prediabetes  >or=6.5%  Consistent with diabetes    High levels of fetal hemoglobin interfere with the HbA1C  assay. Heterozygous hemoglobin variants (HbS, HgC, etc)do  not significantly interfere with this assay.   However, presence of multiple variants may affect accuracy.           ASSESSMENT/PLAN:    There are no diagnoses linked to this encounter.    Today we discussed at length all of the different treatment options including anti-inflammatories, acetaminophen, rest, ice, heat, physical therapy including strengthening and stretching exercises, home exercises, ROM, aerobic conditioning, aqua therapy, other modalities including ultrasound, massage, and dry needling, epidural steroid injections and finally surgical intervention.      Pt sp C3-4 ACDF presents with lumbar radiculopathy. Will refill robaxin and oxycodone. Will proceed with BATSHEVA tomorrow and have him fu with Dr. Chaidez in 2 weeks to discuss surgical options.                                This service was not originating from a related E/M service provided within the previous 7 days nor will  to an E/M service or procedure within the next 24 hours or my soonest available appointment.  Prevailing  standard of care was able to be met in this audio-only visit.

## 2024-07-31 ENCOUNTER — OFFICE VISIT (OUTPATIENT)
Dept: ORTHOPEDICS | Facility: CLINIC | Age: 54
End: 2024-07-31
Payer: COMMERCIAL

## 2024-07-31 DIAGNOSIS — M54.16 LUMBAR RADICULOPATHY: ICD-10-CM

## 2024-07-31 DIAGNOSIS — Z98.890 S/P SPINAL SURGERY: Primary | ICD-10-CM

## 2024-07-31 RX ORDER — METHOCARBAMOL 750 MG/1
750 TABLET, FILM COATED ORAL 3 TIMES DAILY
Qty: 90 TABLET | Refills: 0 | Status: SHIPPED | OUTPATIENT
Start: 2024-07-31 | End: 2024-08-31

## 2024-07-31 RX ORDER — OXYCODONE HYDROCHLORIDE 5 MG/1
5 TABLET ORAL EVERY 8 HOURS PRN
Qty: 21 TABLET | Refills: 0 | Status: SHIPPED | OUTPATIENT
Start: 2024-07-31

## 2024-08-01 ENCOUNTER — HOSPITAL ENCOUNTER (OUTPATIENT)
Facility: HOSPITAL | Age: 54
Discharge: HOME | End: 2024-08-01
Attending: EMERGENCY MEDICINE | Admitting: EMERGENCY MEDICINE
Payer: COMMERCIAL

## 2024-08-01 VITALS
DIASTOLIC BLOOD PRESSURE: 82 MMHG | TEMPERATURE: 99 F | SYSTOLIC BLOOD PRESSURE: 123 MMHG | OXYGEN SATURATION: 96 % | RESPIRATION RATE: 16 BRPM | HEART RATE: 67 BPM

## 2024-08-01 DIAGNOSIS — G89.29 CHRONIC PAIN: ICD-10-CM

## 2024-08-01 PROCEDURE — 25000003 PHARM REV CODE 250: Performed by: EMERGENCY MEDICINE

## 2024-08-01 PROCEDURE — 62323 NJX INTERLAMINAR LMBR/SAC: CPT | Mod: ,,, | Performed by: EMERGENCY MEDICINE

## 2024-08-01 PROCEDURE — 63600175 PHARM REV CODE 636 W HCPCS: Performed by: EMERGENCY MEDICINE

## 2024-08-01 PROCEDURE — 25500020 PHARM REV CODE 255: Performed by: EMERGENCY MEDICINE

## 2024-08-01 PROCEDURE — 62323 NJX INTERLAMINAR LMBR/SAC: CPT | Performed by: EMERGENCY MEDICINE

## 2024-08-01 PROCEDURE — 99152 MOD SED SAME PHYS/QHP 5/>YRS: CPT | Performed by: EMERGENCY MEDICINE

## 2024-08-01 RX ORDER — DEXAMETHASONE SODIUM PHOSPHATE 10 MG/ML
INJECTION INTRAMUSCULAR; INTRAVENOUS
Status: DISCONTINUED | OUTPATIENT
Start: 2024-08-01 | End: 2024-08-01 | Stop reason: HOSPADM

## 2024-08-01 RX ORDER — FENTANYL CITRATE 50 UG/ML
INJECTION, SOLUTION INTRAMUSCULAR; INTRAVENOUS
Status: DISCONTINUED | OUTPATIENT
Start: 2024-08-01 | End: 2024-08-01 | Stop reason: HOSPADM

## 2024-08-01 RX ORDER — LIDOCAINE HYDROCHLORIDE 10 MG/ML
INJECTION, SOLUTION EPIDURAL; INFILTRATION; INTRACAUDAL; PERINEURAL
Status: DISCONTINUED | OUTPATIENT
Start: 2024-08-01 | End: 2024-08-01 | Stop reason: HOSPADM

## 2024-08-01 RX ORDER — MIDAZOLAM HYDROCHLORIDE 1 MG/ML
INJECTION INTRAMUSCULAR; INTRAVENOUS
Status: DISCONTINUED | OUTPATIENT
Start: 2024-08-01 | End: 2024-08-01 | Stop reason: HOSPADM

## 2024-08-04 ENCOUNTER — PATIENT MESSAGE (OUTPATIENT)
Dept: ORTHOPEDICS | Facility: CLINIC | Age: 54
End: 2024-08-04
Payer: COMMERCIAL

## 2024-08-05 ENCOUNTER — HOSPITAL ENCOUNTER (OUTPATIENT)
Dept: RADIOLOGY | Facility: HOSPITAL | Age: 54
Discharge: HOME | End: 2024-08-05
Attending: ORTHOPAEDIC SURGERY
Payer: COMMERCIAL

## 2024-08-05 ENCOUNTER — PATIENT MESSAGE (OUTPATIENT)
Dept: ORTHOPEDICS | Facility: CLINIC | Age: 54
End: 2024-08-05
Payer: COMMERCIAL

## 2024-08-05 DIAGNOSIS — M54.12 CERVICAL RADICULOPATHY: ICD-10-CM

## 2024-08-05 DIAGNOSIS — M96.1 CERVICAL POST-LAMINECTOMY SYNDROME: ICD-10-CM

## 2024-08-05 DIAGNOSIS — M54.16 LUMBAR RADICULOPATHY: ICD-10-CM

## 2024-08-05 DIAGNOSIS — M50.30 DDD (DEGENERATIVE DISC DISEASE), CERVICAL: ICD-10-CM

## 2024-08-05 PROCEDURE — 72040 X-RAY EXAM NECK SPINE 2-3 VW: CPT | Mod: 26,,, | Performed by: RADIOLOGY

## 2024-08-05 PROCEDURE — 72040 X-RAY EXAM NECK SPINE 2-3 VW: CPT | Mod: TC

## 2024-08-05 RX ORDER — BUTALBITAL, ACETAMINOPHEN AND CAFFEINE 50; 325; 40 MG/1; MG/1; MG/1
1 TABLET ORAL EVERY 6 HOURS PRN
Qty: 90 TABLET | Refills: 0 | Status: SHIPPED | OUTPATIENT
Start: 2024-08-05 | End: 2024-09-04

## 2024-08-05 RX ORDER — GABAPENTIN 300 MG/1
CAPSULE ORAL
Qty: 120 CAPSULE | Refills: 0 | Status: SHIPPED | OUTPATIENT
Start: 2024-08-05 | End: 2024-09-05

## 2024-08-13 ENCOUNTER — OFFICE VISIT (OUTPATIENT)
Dept: ORTHOPEDICS | Facility: CLINIC | Age: 54
End: 2024-08-13
Payer: COMMERCIAL

## 2024-08-13 DIAGNOSIS — M54.16 LUMBAR RADICULOPATHY: ICD-10-CM

## 2024-08-13 DIAGNOSIS — M51.36 DDD (DEGENERATIVE DISC DISEASE), LUMBAR: ICD-10-CM

## 2024-08-13 DIAGNOSIS — M47.816 LUMBAR SPONDYLOSIS: ICD-10-CM

## 2024-08-13 DIAGNOSIS — Z98.1 S/P CERVICAL SPINAL FUSION: Primary | ICD-10-CM

## 2024-08-13 DIAGNOSIS — M48.07 SPINAL STENOSIS, LUMBOSACRAL REGION: ICD-10-CM

## 2024-08-13 PROCEDURE — 99214 OFFICE O/P EST MOD 30 MIN: CPT | Mod: S$GLB,,, | Performed by: ORTHOPAEDIC SURGERY

## 2024-08-13 PROCEDURE — 99999 PR PBB SHADOW E&M-EST. PATIENT-LVL III: CPT | Mod: PBBFAC,,, | Performed by: ORTHOPAEDIC SURGERY

## 2024-08-13 PROCEDURE — 1159F MED LIST DOCD IN RCRD: CPT | Mod: CPTII,S$GLB,, | Performed by: ORTHOPAEDIC SURGERY

## 2024-08-13 PROCEDURE — 1160F RVW MEDS BY RX/DR IN RCRD: CPT | Mod: CPTII,S$GLB,, | Performed by: ORTHOPAEDIC SURGERY

## 2024-08-13 PROCEDURE — 3044F HG A1C LEVEL LT 7.0%: CPT | Mod: CPTII,S$GLB,, | Performed by: ORTHOPAEDIC SURGERY

## 2024-08-13 NOTE — PROGRESS NOTES
DATE: 8/14/2024  PATIENT: Heraclio Lam    Attending Physician: Cosme Chaidez M.D.    5/2/24: C3-4 ACDF    HISTORY:  Heraclio Lam is a 54 y.o. male who returns to me today for FU. Patient continues to have LBP that radiates to BLE. Back/leg pain is about 60/40%. There is associated BLE n/t and weakness. He cannot walk far due to pain; he gets relief by leaning forward on a shopping cart. He has been taking meds without much relief. He is miserable and considering surgical intervention.    Regarding his neck, he is doing fine.     The patient does not smoke, have DM or endorse IVDU. The patient is not on any blood thinners and does not take chronic narcotics. He is a retired .    PMH/PSH/FamHx/SocHx:  Unchanged from prior visit    ROS:  Positive for LBP and BLE pain  Denies perineal paresthesias, bowel or bladder incontinence    EXAM:  There were no vitals taken for this visit.    My physical examination was notable for the following findings: motor intact BLE; SILT    IMAGING:  Today I independently reviewed the following images and my interpretations are as follows:    Previous L-spine XRs showed spondylosis and DDD.    Cervical Xrs showed C3-4 and C6-7 ACDF without hardware complications.    MRI cervical showed improved C3-4 stenosis.    MRI thoracic showed T8-9 mod central stenosis.    MRI lumbar showed mod-severe L3-5 central stenosis and L4-5 b/l foraminal stenosis.     ASSESSMENT/PLAN:  Patient has lumbar spondylosis and stenosis with neurogenic claudication. I educated the patient on the importance of core/back strengthening, correct posture, bending/lifting ergonomics, and low-impact aerobic exercises (walking, elliptical, and aquatherapy). Continue medications. I ordered CT lumbar for better anatomical delineation. RTC in 2 weeks for preop. He is a candidate for L3-5 PLDF/TLIF (L) if he fails conservative management.     Cosme Chaidez MD  Orthopaedic Spine Surgeon  Department of  Orthopaedic Surgery  [phone]

## 2024-08-26 DIAGNOSIS — M54.12 CERVICAL RADICULOPATHY: ICD-10-CM

## 2024-08-26 DIAGNOSIS — M54.16 LUMBAR RADICULOPATHY: ICD-10-CM

## 2024-08-26 DIAGNOSIS — M96.1 CERVICAL POST-LAMINECTOMY SYNDROME: ICD-10-CM

## 2024-08-26 RX ORDER — GABAPENTIN 300 MG/1
CAPSULE ORAL
Qty: 120 CAPSULE | Refills: 0 | Status: SHIPPED | OUTPATIENT
Start: 2024-08-26 | End: 2024-09-25

## 2024-08-26 RX ORDER — METHOCARBAMOL 750 MG/1
750 TABLET, FILM COATED ORAL 3 TIMES DAILY
Qty: 90 TABLET | Refills: 0 | Status: SHIPPED | OUTPATIENT
Start: 2024-08-26 | End: 2024-09-28

## 2024-09-04 ENCOUNTER — LAB VISIT (OUTPATIENT)
Dept: LAB | Facility: HOSPITAL | Age: 54
End: 2024-09-04
Attending: INTERNAL MEDICINE
Payer: COMMERCIAL

## 2024-09-04 ENCOUNTER — OFFICE VISIT (OUTPATIENT)
Dept: INTERNAL MEDICINE | Facility: CLINIC | Age: 54
End: 2024-09-04
Payer: COMMERCIAL

## 2024-09-04 VITALS
DIASTOLIC BLOOD PRESSURE: 72 MMHG | BODY MASS INDEX: 28.49 KG/M2 | WEIGHT: 192.38 LBS | HEART RATE: 72 BPM | SYSTOLIC BLOOD PRESSURE: 120 MMHG | HEIGHT: 69 IN | OXYGEN SATURATION: 99 %

## 2024-09-04 DIAGNOSIS — M96.1 CERVICAL POST-LAMINECTOMY SYNDROME: ICD-10-CM

## 2024-09-04 DIAGNOSIS — Z23 NEED FOR VACCINATION: ICD-10-CM

## 2024-09-04 DIAGNOSIS — M48.062 LUMBAR STENOSIS WITH NEUROGENIC CLAUDICATION: ICD-10-CM

## 2024-09-04 DIAGNOSIS — K21.9 GASTROESOPHAGEAL REFLUX DISEASE WITHOUT ESOPHAGITIS: ICD-10-CM

## 2024-09-04 DIAGNOSIS — E78.5 HYPERLIPIDEMIA, UNSPECIFIED HYPERLIPIDEMIA TYPE: ICD-10-CM

## 2024-09-04 DIAGNOSIS — Z00.00 ANNUAL PHYSICAL EXAM: ICD-10-CM

## 2024-09-04 DIAGNOSIS — Z00.00 ANNUAL PHYSICAL EXAM: Primary | ICD-10-CM

## 2024-09-04 LAB
ALBUMIN SERPL BCP-MCNC: 4.3 G/DL (ref 3.5–5.2)
ALP SERPL-CCNC: 73 U/L (ref 55–135)
ALT SERPL W/O P-5'-P-CCNC: 23 U/L (ref 10–44)
ANION GAP SERPL CALC-SCNC: 11 MMOL/L (ref 8–16)
AST SERPL-CCNC: 15 U/L (ref 10–40)
BASOPHILS # BLD AUTO: 0.01 K/UL (ref 0–0.2)
BASOPHILS NFR BLD: 0.1 % (ref 0–1.9)
BILIRUB SERPL-MCNC: 0.5 MG/DL (ref 0.1–1)
BUN SERPL-MCNC: 17 MG/DL (ref 6–20)
CALCIUM SERPL-MCNC: 9.7 MG/DL (ref 8.7–10.5)
CHLORIDE SERPL-SCNC: 102 MMOL/L (ref 95–110)
CO2 SERPL-SCNC: 27 MMOL/L (ref 23–29)
CREAT SERPL-MCNC: 0.9 MG/DL (ref 0.5–1.4)
DIFFERENTIAL METHOD BLD: ABNORMAL
EOSINOPHIL # BLD AUTO: 0 K/UL (ref 0–0.5)
EOSINOPHIL NFR BLD: 0.2 % (ref 0–8)
ERYTHROCYTE [DISTWIDTH] IN BLOOD BY AUTOMATED COUNT: 11.9 % (ref 11.5–14.5)
EST. GFR  (NO RACE VARIABLE): >60 ML/MIN/1.73 M^2
GLUCOSE SERPL-MCNC: 87 MG/DL (ref 70–110)
HCT VFR BLD AUTO: 44.1 % (ref 40–54)
HGB BLD-MCNC: 15.5 G/DL (ref 14–18)
IMM GRANULOCYTES # BLD AUTO: 0.01 K/UL (ref 0–0.04)
IMM GRANULOCYTES NFR BLD AUTO: 0.1 % (ref 0–0.5)
LYMPHOCYTES # BLD AUTO: 2.4 K/UL (ref 1–4.8)
LYMPHOCYTES NFR BLD: 29.6 % (ref 18–48)
MCH RBC QN AUTO: 32.4 PG (ref 27–31)
MCHC RBC AUTO-ENTMCNC: 35.1 G/DL (ref 32–36)
MCV RBC AUTO: 92 FL (ref 82–98)
MONOCYTES # BLD AUTO: 0.8 K/UL (ref 0.3–1)
MONOCYTES NFR BLD: 9.7 % (ref 4–15)
NEUTROPHILS # BLD AUTO: 4.8 K/UL (ref 1.8–7.7)
NEUTROPHILS NFR BLD: 60.3 % (ref 38–73)
NRBC BLD-RTO: 0 /100 WBC
PLATELET # BLD AUTO: 160 K/UL (ref 150–450)
PMV BLD AUTO: 11.5 FL (ref 9.2–12.9)
POTASSIUM SERPL-SCNC: 4.2 MMOL/L (ref 3.5–5.1)
PROT SERPL-MCNC: 7.1 G/DL (ref 6–8.4)
RBC # BLD AUTO: 4.79 M/UL (ref 4.6–6.2)
SODIUM SERPL-SCNC: 140 MMOL/L (ref 136–145)
WBC # BLD AUTO: 8.01 K/UL (ref 3.9–12.7)

## 2024-09-04 PROCEDURE — 80053 COMPREHEN METABOLIC PANEL: CPT | Performed by: INTERNAL MEDICINE

## 2024-09-04 PROCEDURE — 36415 COLL VENOUS BLD VENIPUNCTURE: CPT | Performed by: INTERNAL MEDICINE

## 2024-09-04 PROCEDURE — 99999 PR PBB SHADOW E&M-EST. PATIENT-LVL III: CPT | Mod: PBBFAC,,, | Performed by: INTERNAL MEDICINE

## 2024-09-04 PROCEDURE — 85025 COMPLETE CBC W/AUTO DIFF WBC: CPT | Performed by: INTERNAL MEDICINE

## 2024-09-04 RX ORDER — PANTOPRAZOLE SODIUM 40 MG/1
40 TABLET, DELAYED RELEASE ORAL DAILY
Qty: 28 TABLET | Refills: 0 | Status: SHIPPED | OUTPATIENT
Start: 2024-09-04 | End: 2024-10-02

## 2024-09-04 NOTE — PROGRESS NOTES
"    CHIEF COMPLAINT     Chief Complaint   Patient presents with    Annual Exam       HPI     Heraclio Lam is a 54 y.o. male chronic neck and back pain, hyperlipidemia and acid reflux here today for annual exam      Having issues with C spine and L spine.  [facility]  Reports numbness and burning in lower back/butt with standing.  Status post recent lumbar BATSHEVA which was marginally helpful for a few days.    Also reports accidentally ingesting some  5 days ago.  Poison control was contacted and was told to drink large volume water.  Reports that his reflux has been worse since that time.  Not coughing up blood, no trouble swallowing    Personally Reviewed Patient's Medical, surgical, family and social hx. Changes updated in [facility].  Care Team updated in Epic    Review of Systems:  Review of Systems   Constitutional:  Positive for activity change. Negative for unexpected weight change.   HENT:  Negative for hearing loss, rhinorrhea and trouble swallowing.    Eyes:  Negative for discharge and visual disturbance.   Respiratory:  Negative for chest tightness and wheezing.    Cardiovascular:  Negative for chest pain and palpitations.   Gastrointestinal:  Negative for blood in stool, constipation, diarrhea and vomiting.   Endocrine: Negative for polydipsia and polyuria.   Genitourinary:  Negative for difficulty urinating, hematuria and urgency.   Musculoskeletal:  Positive for arthralgias and neck pain. Negative for joint swelling.   Neurological:  Positive for weakness and headaches.   Psychiatric/Behavioral:  Negative for confusion and dysphoric mood.        Health Maintenance:   Reviewed with patient  Due for the following:      PHYSICAL EXAM     /72 (BP Location: Left arm, Patient Position: Sitting)   Pulse 72   Ht 5' 9" (1.753 m)   Wt 87.2 kg (192 lb 5.6 oz)   SpO2 99%   BMI 28.41 kg/m²     Gen: Well Appearing, NAD  HEENT: PERR, EOMI  Neck: FROM, no thyromegaly, no " cervical adenopathy, scar from previous cervical spine surgery  CVD: RRR, no M/R/G  Pulm: Normal work of breathing, CTAB, no wheezing  Abd:  Soft, NT, ND non TTP, no mass  MSK: no LE edema  Neuro: A&Ox3, gait normal, speech normal  Mood; Mood normal, behavior normal, thought process linear       LABS     Labs reviewed; Notable for  Lab Results   Component Value Date    WBC 7.50 04/10/2024    HGB 16.4 04/10/2024    HCT 43 05/03/2024    MCV 91 04/10/2024     04/10/2024         Chemistry        Component Value Date/Time     04/10/2024 0833    K 4.2 04/10/2024 0833     04/10/2024 0833    CO2 28 04/10/2024 0833    BUN 17 04/10/2024 0833    CREATININE 1.0 04/10/2024 0833     (H) 04/10/2024 0833        Component Value Date/Time    CALCIUM 10.4 04/10/2024 0833    ALKPHOS 72 04/10/2024 0833    AST 17 04/10/2024 0833    ALT 32 04/10/2024 0833    BILITOT 0.9 04/10/2024 0833    ESTGFRAFRICA >60.0 06/22/2021 1314    EGFRNONAA >60.0 06/22/2021 1314        Lab Results   Component Value Date    HGBA1C 5.6 04/10/2024     Lab Results   Component Value Date    LDLCALC 36.0 (L) 04/10/2024         MRI C spine  ASSESSMENT     1. Annual physical exam  CBC W/ AUTO DIFFERENTIAL    Comprehensive Metabolic Panel      2. Cervical post-laminectomy syndrome        3. Lumbar stenosis with neurogenic claudication        4. Hyperlipidemia, unspecified hyperlipidemia type        5. Gastroesophageal reflux disease without esophagitis  pantoprazole (PROTONIX) 40 MG tablet              Plan     Heraclio Lam is a 54 y.o. male with chronic neck and back pain, hyperlipidemia and acid reflux    1. Annual physical exam  Updated problem list, medical history, care team and discussed HM.     - CBC W/ AUTO DIFFERENTIAL; Future  - Comprehensive Metabolic Panel; Future    2. Cervical post-laminectomy syndrome  Followed by back and spine clinic  Currently on medication regimen for neck and back    3. Lumbar stenosis with  neurogenic claudication  Symptoms worse  Has CT lumbar spine scheduled in follow-up with the orthopedic surgeon in a few weeks  Considering surgical evaluation    4. Hyperlipidemia, unspecified hyperlipidemia type  LDL at goal continue Crestor 20    5. Gastroesophageal reflux disease without esophagitis  Will treat with 30 days of PPI to allow mucosa to heal after ingestion.  Can transition back to H2 blocker after 30 days  - pantoprazole (PROTONIX) 40 MG tablet; Take 1 tablet (40 mg total) by mouth once daily.  Dispense: 28 tablet; Refill: 0      Louis Garay MD

## 2024-09-12 ENCOUNTER — HOSPITAL ENCOUNTER (OUTPATIENT)
Dept: RADIOLOGY | Facility: HOSPITAL | Age: 54
Discharge: HOME | End: 2024-09-12
Attending: ORTHOPAEDIC SURGERY
Payer: COMMERCIAL

## 2024-09-12 DIAGNOSIS — M48.07 SPINAL STENOSIS, LUMBOSACRAL REGION: ICD-10-CM

## 2024-09-12 PROCEDURE — 72131 CT LUMBAR SPINE W/O DYE: CPT | Mod: TC

## 2024-09-12 PROCEDURE — 72131 CT LUMBAR SPINE W/O DYE: CPT | Mod: 26,,, | Performed by: RADIOLOGY

## 2024-09-15 DIAGNOSIS — E78.5 HYPERLIPIDEMIA, UNSPECIFIED HYPERLIPIDEMIA TYPE: ICD-10-CM

## 2024-09-16 RX ORDER — ROSUVASTATIN CALCIUM 20 MG/1
20 TABLET, COATED ORAL
Qty: 90 TABLET | Refills: 2 | Status: SHIPPED | OUTPATIENT
Start: 2024-09-16

## 2024-09-16 NOTE — TELEPHONE ENCOUNTER
Refill Decision Note   Heraclio Lam  is requesting a refill authorization.  Brief Assessment and Rationale for Refill:  Approve     Medication Therapy Plan:         Comments:     Note composed:4:56 PM 09/16/2024             Appointments     Last Visit   9/4/2024 Louis Garay MD   Next Visit   Visit date not found Louis Garay MD

## 2024-09-16 NOTE — TELEPHONE ENCOUNTER
No care due was identified.  [facility] Embedded Care Due Messages. Reference number: [id number].   9/15/2024 9:26:06 PM CDT

## 2024-09-23 DIAGNOSIS — K21.9 GASTROESOPHAGEAL REFLUX DISEASE WITHOUT ESOPHAGITIS: ICD-10-CM

## 2024-09-24 RX ORDER — METHOCARBAMOL 750 MG/1
750 TABLET, FILM COATED ORAL 3 TIMES DAILY
Qty: 90 TABLET | Refills: 0 | Status: SHIPPED | OUTPATIENT
Start: 2024-09-24 | End: 2024-10-26

## 2024-09-24 NOTE — TELEPHONE ENCOUNTER
Refill Routing Note   Medication(s) are not appropriate for processing by [facility] for the following reason(s):        New or recently adjusted medication: unclear if therapy will be long or short term    [facility] action(s):  Defer      Medication Therapy Plan:         Appointments  past 12m or future 3m with PCP    Date Provider   Last Visit   9/4/2024 Louis Garay MD   Next Visit   Visit date not found Louis Garay MD   ED visits in past 90 days: 1        Note composed:5:20 PM 09/24/2024

## 2024-09-24 NOTE — TELEPHONE ENCOUNTER
No care due was identified.  [facility] Embedded Care Due Messages. Reference number: [id number].   9/23/2024 11:48:45 PM CDT

## 2024-09-25 RX ORDER — PANTOPRAZOLE SODIUM 40 MG/1
40 TABLET, DELAYED RELEASE ORAL DAILY
Qty: 28 TABLET | Refills: 0 | Status: SHIPPED | OUTPATIENT
Start: 2024-09-25 | End: 2024-10-24

## 2024-10-02 ENCOUNTER — OFFICE VISIT (OUTPATIENT)
Dept: ORTHOPEDICS | Facility: CLINIC | Age: 54
End: 2024-10-02
Attending: ORTHOPAEDIC SURGERY
Payer: COMMERCIAL

## 2024-10-02 VITALS — HEIGHT: 69 IN | BODY MASS INDEX: 29.61 KG/M2 | WEIGHT: 199.94 LBS

## 2024-10-02 DIAGNOSIS — M51.369 DEGENERATION OF INTERVERTEBRAL DISC OF LUMBAR REGION WITHOUT DISCOGENIC BACK PAIN OR LOWER EXTREMITY PAIN: ICD-10-CM

## 2024-10-02 DIAGNOSIS — M47.816 LUMBAR SPONDYLOSIS: ICD-10-CM

## 2024-10-02 DIAGNOSIS — M48.062 LUMBAR STENOSIS WITH NEUROGENIC CLAUDICATION: Primary | ICD-10-CM

## 2024-10-02 PROCEDURE — 99999 PR PBB SHADOW E&M-EST. PATIENT-LVL IV: CPT | Mod: PBBFAC,,, | Performed by: ORTHOPAEDIC SURGERY

## 2024-10-02 PROCEDURE — 99214 OFFICE O/P EST MOD 30 MIN: CPT | Mod: S$GLB,,, | Performed by: ORTHOPAEDIC SURGERY

## 2024-10-02 PROCEDURE — 1159F MED LIST DOCD IN RCRD: CPT | Mod: CPTII,S$GLB,, | Performed by: ORTHOPAEDIC SURGERY

## 2024-10-02 PROCEDURE — 3044F HG A1C LEVEL LT 7.0%: CPT | Mod: CPTII,S$GLB,, | Performed by: ORTHOPAEDIC SURGERY

## 2024-10-02 PROCEDURE — 3008F BODY MASS INDEX DOCD: CPT | Mod: CPTII,S$GLB,, | Performed by: ORTHOPAEDIC SURGERY

## 2024-10-02 PROCEDURE — 1160F RVW MEDS BY RX/DR IN RCRD: CPT | Mod: CPTII,S$GLB,, | Performed by: ORTHOPAEDIC SURGERY

## 2024-10-03 NOTE — PROGRESS NOTES
"DATE: 10/3/2024  PATIENT: Heraclio Lam    Attending Physician: Cosme Chaidez M.D.    5/2/24: C3-4 ACDF    HISTORY:  Heraclio Lam is a 54 y.o. male who returns to me today for FU after getting b/l L4-5 TFESI on 8/1/24, which provided 50% relief for 3 days. Patient continues to have LBP that radiates to BLE. Back/leg pain is about 60/40%. There is associated BLE n/t and weakness. He cannot walk far due to pain; he gets relief by leaning forward on a shopping cart. He has been taking meds without much relief. He is miserable and wants surgical intervention.    Regarding his neck, he is doing fine.     The patient does not smoke, have DM or endorse IVDU. The patient is not on any blood thinners and does not take chronic narcotics. He is a retired .    PMH/PSH/FamHx/SocHx:  Unchanged from prior visit    ROS:  Positive for LBP and BLE pain  Denies perineal paresthesias, bowel or bladder incontinence    EXAM:  Ht 5' 9" (1.753 m)   Wt 90.7 kg (199 lb 15.3 oz)   BMI 29.53 kg/m²     My physical examination was notable for the following findings: motor intact BLE; SILT    IMAGING:  Today I independently reviewed the following images and my interpretations are as follows:    Previous L-spine XRs showed spondylosis and DDD.    Cervical Xrs showed C3-4 and C6-7 ACDF without hardware complications.    MRI cervical showed improved C3-4 stenosis.    MRI thoracic showed T8-9 mod central stenosis.    MRI lumbar showed mod-severe L3-5 central stenosis and L4-5 b/l foraminal stenosis.     CT lumbar showed spondylosis    ASSESSMENT/PLAN:  Patient has lumbar spondylosis and stenosis with neurogenic claudication. I educated the patient on the importance of core/back strengthening, correct posture, bending/lifting ergonomics, and low-impact aerobic exercises (walking, elliptical, and aquatherapy). Continue medications. He has failed conservative management and is a candidate for L3-5 PLDF/TLIF (L).    I had a " sit down discussion with the patient and family regarding the above surgery. We specifically discussed the risks, benefits, and alternatives to surgery. We discussed the surgical procedure including the skin incision, nerve decompression, bone fusion, allograft, iliac crest bone graft, and surgical implants including pedicle screws and interbody devices as indicated: they understand the risks include but are not limited to death, paralysis, blindness, bleeding, infection, damage to arteries, veins and nerves, spinal fluid leak, continued or worsening pain, no improvement in symptoms, non-union, and the possible need for more surgery in the future, the possible need for perioperative blood transfusion, as well as the possibility other unforseen and unknown complications. We talked about expected hospital stay and recovery period. All questions were answered; they understand and wish to proceed.     Cosme Chaidez MD  Orthopaedic Spine Surgeon  Department of Orthopaedic Surgery  [phone]

## 2024-10-07 ENCOUNTER — TELEPHONE (OUTPATIENT)
Dept: ORTHOPEDICS | Facility: CLINIC | Age: 54
End: 2024-10-07
Payer: COMMERCIAL

## 2024-10-07 NOTE — TELEPHONE ENCOUNTER
Spoke to pt, informed his arrival time for surgery tomorrow is 9:30am at [facility] 2nd floor. Informed that insurance is pending authorization and if it's not approved by 7am tomorrow, surgery will be rescheduled. No food or drink after midnight.  Pt and pt's mother pleased and verbalized understanding.

## 2024-10-16 DIAGNOSIS — M54.12 CERVICAL RADICULOPATHY: ICD-10-CM

## 2024-10-16 DIAGNOSIS — M96.1 CERVICAL POST-LAMINECTOMY SYNDROME: ICD-10-CM

## 2024-10-16 DIAGNOSIS — M54.16 LUMBAR RADICULOPATHY: ICD-10-CM

## 2024-10-17 RX ORDER — GABAPENTIN 300 MG/1
CAPSULE ORAL
Qty: 120 CAPSULE | Refills: 0 | Status: SHIPPED | OUTPATIENT
Start: 2024-10-17 | End: 2024-11-16

## 2024-10-25 DIAGNOSIS — K21.9 GASTROESOPHAGEAL REFLUX DISEASE WITHOUT ESOPHAGITIS: ICD-10-CM

## 2024-10-28 RX ORDER — PANTOPRAZOLE SODIUM 40 MG/1
40 TABLET, DELAYED RELEASE ORAL DAILY
Qty: 28 TABLET | Refills: 0 | Status: SHIPPED | OUTPATIENT
Start: 2024-10-28 | End: 2024-11-29

## 2024-11-01 ENCOUNTER — TELEPHONE (OUTPATIENT)
Dept: ORTHOPEDICS | Facility: CLINIC | Age: 54
End: 2024-11-01
Payer: COMMERCIAL

## 2024-11-04 ENCOUNTER — TELEPHONE (OUTPATIENT)
Dept: ORTHOPEDICS | Facility: CLINIC | Age: 54
End: 2024-11-04
Payer: COMMERCIAL

## 2024-11-08 ENCOUNTER — PATIENT MESSAGE (OUTPATIENT)
Dept: ORTHOPEDICS | Facility: CLINIC | Age: 54
End: 2024-11-08
Payer: COMMERCIAL

## 2024-11-09 DIAGNOSIS — M96.1 CERVICAL POST-LAMINECTOMY SYNDROME: ICD-10-CM

## 2024-11-09 DIAGNOSIS — M54.12 CERVICAL RADICULOPATHY: ICD-10-CM

## 2024-11-09 DIAGNOSIS — M54.16 LUMBAR RADICULOPATHY: ICD-10-CM

## 2024-11-11 RX ORDER — GABAPENTIN 300 MG/1
CAPSULE ORAL
Qty: 120 CAPSULE | Refills: 0 | Status: SHIPPED | OUTPATIENT
Start: 2024-11-11 | End: 2024-12-12

## 2024-11-25 DIAGNOSIS — K21.9 GASTROESOPHAGEAL REFLUX DISEASE WITHOUT ESOPHAGITIS: ICD-10-CM

## 2024-11-25 NOTE — TELEPHONE ENCOUNTER
No care due was identified.  [facility] Embedded Care Due Messages. Reference number: [id number].   11/25/2024 3:06:05 PM CST

## 2024-11-26 RX ORDER — PANTOPRAZOLE SODIUM 40 MG/1
40 TABLET, DELAYED RELEASE ORAL DAILY
Qty: 28 TABLET | Refills: 0 | Status: SHIPPED | OUTPATIENT
Start: 2024-11-26 | End: 2024-12-27

## 2024-11-26 NOTE — TELEPHONE ENCOUNTER
Refill Routing Note   Medication(s) are not appropriate for processing by [facility] for the following reason(s):        New or recently adjusted medication    [facility] action(s):  Defer               Appointments  past 12m or future 3m with PCP    Date Provider   Last Visit   9/4/2024 Louis Garay MD   Next Visit   Visit date not found Louis Garay MD   ED visits in past 90 days: 0        Note composed:6:21 AM 11/26/2024

## 2024-12-03 ENCOUNTER — TELEPHONE (OUTPATIENT)
Dept: ORTHOPEDICS | Facility: CLINIC | Age: 54
End: 2024-12-03
Payer: COMMERCIAL

## 2024-12-08 DIAGNOSIS — M54.16 LUMBAR RADICULOPATHY: ICD-10-CM

## 2024-12-08 DIAGNOSIS — M96.1 CERVICAL POST-LAMINECTOMY SYNDROME: ICD-10-CM

## 2024-12-08 DIAGNOSIS — M54.12 CERVICAL RADICULOPATHY: ICD-10-CM

## 2024-12-09 RX ORDER — GABAPENTIN 300 MG/1
CAPSULE ORAL
Qty: 120 CAPSULE | Refills: 0 | Status: SHIPPED | OUTPATIENT
Start: 2024-12-09 | End: 2025-01-09

## 2024-12-10 ENCOUNTER — IMMUNIZATION (OUTPATIENT)
Dept: INTERNAL MEDICINE | Facility: CLINIC | Age: 54
End: 2024-12-10
Payer: COMMERCIAL

## 2024-12-10 DIAGNOSIS — Z23 NEED FOR VACCINATION: Primary | ICD-10-CM

## 2024-12-10 PROCEDURE — 90480 ADMN SARSCOV2 VAC 1/ONLY CMP: CPT | Mod: S$GLB,,, | Performed by: INTERNAL MEDICINE

## 2024-12-10 PROCEDURE — 91320 SARSCV2 VAC 30MCG TRS-SUC IM: CPT | Mod: S$GLB,,, | Performed by: INTERNAL MEDICINE

## 2024-12-19 DIAGNOSIS — K21.9 GASTROESOPHAGEAL REFLUX DISEASE WITHOUT ESOPHAGITIS: ICD-10-CM

## 2024-12-19 RX ORDER — PANTOPRAZOLE SODIUM 40 MG/1
40 TABLET, DELAYED RELEASE ORAL DAILY
Qty: 28 TABLET | Refills: 0 | Status: CANCELLED | OUTPATIENT
Start: 2024-12-19 | End: 2025-01-16

## 2024-12-19 NOTE — TELEPHONE ENCOUNTER
No care due was identified.  [facility] Embedded Care Due Messages. Reference number: [id number].   12/19/2024 11:11:21 AM CST

## 2024-12-19 NOTE — TELEPHONE ENCOUNTER
No care due was identified.  [facility] Embedded Care Due Messages. Reference number: [id number].   12/19/2024 12:23:50 PM CST

## 2024-12-20 RX ORDER — PANTOPRAZOLE SODIUM 40 MG/1
40 TABLET, DELAYED RELEASE ORAL DAILY
Qty: 90 TABLET | Refills: 3 | Status: SHIPPED | OUTPATIENT
Start: 2024-12-20

## 2024-12-20 NOTE — TELEPHONE ENCOUNTER
Refill Routing Note   Medication(s) are not appropriate for processing by [facility] for the following reason(s):        New or recently adjusted medication    [facility] action(s):  Defer             Pharmacist review requested: Yes     Appointments  past 12m or future 3m with PCP    Date Provider   Last Visit   9/4/2024 Louis Garay MD   Next Visit   Visit date not found Louis Garay MD   ED visits in past 90 days: 0        Note composed:9:47 PM 12/19/2024

## 2025-01-07 DIAGNOSIS — M96.1 CERVICAL POST-LAMINECTOMY SYNDROME: ICD-10-CM

## 2025-01-07 DIAGNOSIS — M54.12 CERVICAL RADICULOPATHY: ICD-10-CM

## 2025-01-07 DIAGNOSIS — M54.16 LUMBAR RADICULOPATHY: ICD-10-CM

## 2025-01-07 RX ORDER — GABAPENTIN 300 MG/1
CAPSULE ORAL
Qty: 120 CAPSULE | Refills: 0 | Status: SHIPPED | OUTPATIENT
Start: 2025-01-07 | End: 2025-02-07

## 2025-02-02 DIAGNOSIS — M96.1 CERVICAL POST-LAMINECTOMY SYNDROME: ICD-10-CM

## 2025-02-02 DIAGNOSIS — M54.12 CERVICAL RADICULOPATHY: ICD-10-CM

## 2025-02-02 DIAGNOSIS — M54.16 LUMBAR RADICULOPATHY: ICD-10-CM

## 2025-02-03 RX ORDER — GABAPENTIN 300 MG/1
CAPSULE ORAL
Qty: 120 CAPSULE | Refills: 0 | Status: SHIPPED | OUTPATIENT
Start: 2025-02-03 | End: 2025-03-02 | Stop reason: SDUPTHER

## 2025-03-02 DIAGNOSIS — M54.12 CERVICAL RADICULOPATHY: ICD-10-CM

## 2025-03-02 DIAGNOSIS — M54.16 LUMBAR RADICULOPATHY: ICD-10-CM

## 2025-03-02 DIAGNOSIS — M96.1 CERVICAL POST-LAMINECTOMY SYNDROME: ICD-10-CM

## 2025-03-03 RX ORDER — GABAPENTIN 300 MG/1
CAPSULE ORAL
Qty: 120 CAPSULE | Refills: 0 | Status: SHIPPED | OUTPATIENT
Start: 2025-03-03 | End: 2025-04-02

## 2025-03-31 DIAGNOSIS — M54.12 CERVICAL RADICULOPATHY: ICD-10-CM

## 2025-03-31 DIAGNOSIS — M96.1 CERVICAL POST-LAMINECTOMY SYNDROME: ICD-10-CM

## 2025-03-31 DIAGNOSIS — M54.16 LUMBAR RADICULOPATHY: ICD-10-CM

## 2025-03-31 RX ORDER — GABAPENTIN 300 MG/1
CAPSULE ORAL
Qty: 120 CAPSULE | Refills: 0 | Status: SHIPPED | OUTPATIENT
Start: 2025-03-31 | End: 2025-05-02

## 2025-04-30 DIAGNOSIS — M54.12 CERVICAL RADICULOPATHY: ICD-10-CM

## 2025-04-30 DIAGNOSIS — M96.1 CERVICAL POST-LAMINECTOMY SYNDROME: ICD-10-CM

## 2025-04-30 DIAGNOSIS — M54.16 LUMBAR RADICULOPATHY: ICD-10-CM

## 2025-05-01 RX ORDER — GABAPENTIN 300 MG/1
CAPSULE ORAL
Qty: 120 CAPSULE | Refills: 0 | Status: SHIPPED | OUTPATIENT
Start: 2025-05-01 | End: 2025-06-02

## 2025-05-04 DIAGNOSIS — E78.5 HYPERLIPIDEMIA, UNSPECIFIED HYPERLIPIDEMIA TYPE: ICD-10-CM

## 2025-05-05 RX ORDER — ROSUVASTATIN CALCIUM 20 MG/1
20 TABLET, COATED ORAL
Qty: 90 TABLET | Refills: 3 | Status: SHIPPED | OUTPATIENT
Start: 2025-05-05

## 2025-05-05 NOTE — TELEPHONE ENCOUNTER
Refill Routing Note   Medication(s) are not appropriate for processing by [facility] for the following reason(s):        Required labs outdated    [facility] action(s):  Defer     Requires labs : Yes             Appointments  past 12m or future 3m with PCP    Date Provider   Last Visit   9/4/2024 Louis Garay MD   Next Visit   Visit date not found Louis Garay MD   ED visits in past 90 days: 0        Note composed:2:55 AM 05/05/2025

## 2025-05-05 NOTE — TELEPHONE ENCOUNTER
Care Due:                  Date            Visit Type   Department     Provider  --------------------------------------------------------------------------------                                MYCHART                              FOLLOWUP/OF  [facility] INTERNAL  Last Visit: 09-      FICE VISIT   MEDICINE       Louis Garay  Next Visit: None Scheduled  None         None Found                                                            Last  Test          Frequency    Reason                     Performed    Due Date  --------------------------------------------------------------------------------    Lipid Panel.  12 months..  rosuvastatin.............  04-   04-    [facility] Embedded Care Due Messages. Reference number: [id number].   5/04/2025 9:30:17 PM CDT

## 2025-05-26 DIAGNOSIS — M54.12 CERVICAL RADICULOPATHY: ICD-10-CM

## 2025-05-26 DIAGNOSIS — M96.1 CERVICAL POST-LAMINECTOMY SYNDROME: ICD-10-CM

## 2025-05-26 DIAGNOSIS — M54.16 LUMBAR RADICULOPATHY: ICD-10-CM

## 2025-05-26 RX ORDER — GABAPENTIN 300 MG/1
CAPSULE ORAL
Qty: 120 CAPSULE | Refills: 0 | Status: SHIPPED | OUTPATIENT
Start: 2025-05-26 | End: 2025-06-30

## (undated) DEVICE — SUT MCRYL PLUS 4-0 PS2 27IN

## (undated) DEVICE — DRAPE T THYROID STERILE

## (undated) DEVICE — DIFFUSER

## (undated) DEVICE — DRESSING LEUKOPLAST FLEX 1X3IN

## (undated) DEVICE — SPONGE COTTON TRAY 4X4IN

## (undated) DEVICE — SUT SILK 2-0 BLK BR PS-2 18

## (undated) DEVICE — RESTRAINT LIMB HOLDER ADJ FOAM

## (undated) DEVICE — NDL SPINAL 18GX3.5 SPINOCAN

## (undated) DEVICE — ADHESIVE MASTISOL VIAL 48/BX

## (undated) DEVICE — APPLIER CLIP LIAGCLIP 9.375IN

## (undated) DEVICE — DRAPE TOP 53X102IN

## (undated) DEVICE — DRESSING TRANS 4X4 TEGADERM

## (undated) DEVICE — ELECTRODE REM PLYHSV RETURN 9

## (undated) DEVICE — ADHESIVE DERMABOND ADVANCED

## (undated) DEVICE — GLOVE GAMMEX 7 PF STERILE

## (undated) DEVICE — BANDAGE ADHESIVE

## (undated) DEVICE — DRAPE STERI-DRAPE 1000 17X11IN

## (undated) DEVICE — KIT SURGIFLO HEMOSTATIC MATRIX

## (undated) DEVICE — COVER CAMERA OPERATING ROOM

## (undated) DEVICE — GLOVE GAMMEX SURG LF PI SZ 7.5

## (undated) DEVICE — CARTRIDGE OIL

## (undated) DEVICE — PIN DISTRACTION DISP 14MM
Type: IMPLANTABLE DEVICE | Site: SPINE CERVICAL | Status: NON-FUNCTIONAL
Removed: 2024-05-02

## (undated) DEVICE — DRAIN CHANNEL ROUND 10FR

## (undated) DEVICE — SEE MEDLINE ITEM 156905

## (undated) DEVICE — TUBE EMG NIM 7.0MM TRIVANTAGE

## (undated) DEVICE — DRAPE C-ARMOR EQUIPMENT COVER

## (undated) DEVICE — GAUZE SPONGE PEANUT STRL

## (undated) DEVICE — BURR RND FLUT SFT TOUCH 2.0MM

## (undated) DEVICE — DRAPE C-ARM ELAS CLIP 42X120IN

## (undated) DEVICE — BUR BONE CUT MICRO TPS 3X3.8MM

## (undated) DEVICE — CORD FOR BIPOLAR FORCEPS 12

## (undated) DEVICE — CHLORAPREP 3ML APPLICATOR TINT

## (undated) DEVICE — SUT VICRYL 3-0 27 SH

## (undated) DEVICE — ALCOHOL 70% ETHYL 16OZ.

## (undated) DEVICE — COVER LIGHT HANDLE 80/CA